# Patient Record
Sex: MALE | Race: WHITE | Employment: OTHER | URBAN - METROPOLITAN AREA
[De-identification: names, ages, dates, MRNs, and addresses within clinical notes are randomized per-mention and may not be internally consistent; named-entity substitution may affect disease eponyms.]

---

## 2017-01-12 ENCOUNTER — TRANSFERRED RECORDS (OUTPATIENT)
Dept: HEALTH INFORMATION MANAGEMENT | Facility: CLINIC | Age: 67
End: 2017-01-12

## 2017-02-16 ENCOUNTER — DOCUMENTATION ONLY (OUTPATIENT)
Dept: VASCULAR SURGERY | Facility: CLINIC | Age: 67
End: 2017-02-16

## 2017-07-27 ENCOUNTER — TRANSFERRED RECORDS (OUTPATIENT)
Dept: HEALTH INFORMATION MANAGEMENT | Facility: CLINIC | Age: 67
End: 2017-07-27

## 2017-07-27 LAB
CREAT SERPL-MCNC: 1 MG/DL (ref 0.7–1.2)
GLUCOSE SERPL-MCNC: 133 MG/DL (ref 74–106)
HBA1C MFR BLD: 6 % (ref 4–6)
POTASSIUM SERPL-SCNC: 4.2 MMOL/L (ref 3.5–5)

## 2017-08-02 ENCOUNTER — TRANSFERRED RECORDS (OUTPATIENT)
Dept: HEALTH INFORMATION MANAGEMENT | Facility: CLINIC | Age: 67
End: 2017-08-02

## 2017-09-12 ENCOUNTER — OFFICE VISIT (OUTPATIENT)
Dept: FAMILY MEDICINE | Facility: CLINIC | Age: 67
End: 2017-09-12
Payer: COMMERCIAL

## 2017-09-12 ENCOUNTER — RADIANT APPOINTMENT (OUTPATIENT)
Dept: GENERAL RADIOLOGY | Facility: CLINIC | Age: 67
End: 2017-09-12
Attending: PHYSICIAN ASSISTANT
Payer: COMMERCIAL

## 2017-09-12 VITALS
DIASTOLIC BLOOD PRESSURE: 84 MMHG | HEIGHT: 69 IN | OXYGEN SATURATION: 96 % | TEMPERATURE: 97.8 F | HEART RATE: 60 BPM | WEIGHT: 239.13 LBS | BODY MASS INDEX: 35.42 KG/M2 | SYSTOLIC BLOOD PRESSURE: 142 MMHG

## 2017-09-12 DIAGNOSIS — R05.9 COUGH: ICD-10-CM

## 2017-09-12 DIAGNOSIS — R06.02 SOB (SHORTNESS OF BREATH): ICD-10-CM

## 2017-09-12 DIAGNOSIS — R05.9 COUGH: Primary | ICD-10-CM

## 2017-09-12 DIAGNOSIS — E11.59 TYPE 2 DIABETES MELLITUS WITH OTHER CIRCULATORY COMPLICATIONS (H): ICD-10-CM

## 2017-09-12 DIAGNOSIS — I25.810 CORONARY ARTERY DISEASE INVOLVING CORONARY BYPASS GRAFT OF NATIVE HEART WITHOUT ANGINA PECTORIS: ICD-10-CM

## 2017-09-12 PROCEDURE — 71020 XR CHEST 2 VW: CPT

## 2017-09-12 PROCEDURE — 99214 OFFICE O/P EST MOD 30 MIN: CPT | Performed by: PHYSICIAN ASSISTANT

## 2017-09-12 RX ORDER — AZITHROMYCIN 250 MG/1
TABLET, FILM COATED ORAL
Qty: 6 TABLET | Refills: 0 | Status: SHIPPED | OUTPATIENT
Start: 2017-09-12 | End: 2017-10-31

## 2017-09-12 RX ORDER — ALBUTEROL SULFATE 90 UG/1
2 AEROSOL, METERED RESPIRATORY (INHALATION) EVERY 6 HOURS PRN
Qty: 1 INHALER | Refills: 0 | Status: SHIPPED | OUTPATIENT
Start: 2017-09-12 | End: 2018-10-29

## 2017-09-12 NOTE — PROGRESS NOTES
"  SUBJECTIVE:   Andrew Torres is a 67 year old male who presents to clinic today for the following health issues:    ENT Symptoms             Symptoms: cc Present Absent Comment   Fever/Chills   x    Fatigue  x     Muscle Aches   x    Eye Irritation   x    Sneezing   x    Nasal Neil/Drg  x     Sinus Pressure/Pain   x    Loss of smell       Dental pain       Sore Throat   x    Swollen Glands   x    Ear Pain/Fullness   x    Cough  x     Wheeze  x     Chest Pain  x  Pain from coughing   Shortness of breath  x     Rash       Other         Symptom duration:  1 week   Symptom severity:  same   Treatments tried:  mucinex, aspirin   Contacts:  none - he did get his wife sick too though     Problem list and histories reviewed & adjusted, as indicated.  Additional history: as documented    Labs reviewed in EPIC    Reviewed and updated as needed this visit by clinical staff       Reviewed and updated as needed this visit by Provider       ROS:  Constitutional, HEENT, cardiovascular, pulmonary, gi and gu systems are negative, except as otherwise noted.      OBJECTIVE:   /84 (BP Location: Right arm, Cuff Size: Adult Large)  Pulse 60  Temp 97.8  F (36.6  C) (Oral)  Ht 5' 8.75\" (1.746 m)  Wt 239 lb 2 oz (108.5 kg)  SpO2 96%  BMI 35.57 kg/m2  Body mass index is 35.57 kg/(m^2).  GENERAL: healthy, alert and no distress  HENT: ear canals and TM's normal, nose and mouth without ulcers or lesions  NECK: no adenopathy, no asymmetry, masses, or scars and thyroid normal to palpation  RESP: scattered crackles and no focal findings, mild restriction in lungs, otherwise lungs clear to auscultation - no rales, rhonchi or wheezes  CV: regular rate and rhythm, normal S1 S2, no S3 or S4, no murmur, click or rub, no peripheral edema and peripheral pulses strong  SKIN: no suspicious lesions or rashes and he has no peripheral edema     X ray chest - no acute findings, mild hazy in the left lower lung, otherwise appears okay "     ASSESSMENT/PLAN:     (R05) Cough  (primary encounter diagnosis)  Comment:   Plan: XR Chest 2 Views, albuterol (PROAIR         HFA/PROVENTIL HFA/VENTOLIN HFA) 108 (90 BASE)         MCG/ACT Inhaler, azithromycin (ZITHROMAX) 250         MG tablet        X ray findings as noted. Treat for pneumonia. No red flag symptoms or exam findings needing emergent cares, no signs of failure or fluid restriction. Inhaler to improve lung function, restriction is quite mild - no need for nebulizer today. This prescription is given with a discussion of side effects, risks and proper use.  Instructions are given to follow up if not improving or symptoms change or worsen as discussed.     (I25.810) Coronary artery disease involving coronary bypass graft of native heart without angina pectoris  Comment:   Plan: Asymptomatic, no chest pain    (E11.59) Type 2 diabetes mellitus with other circulatory complications (H)  Comment:   Plan: I reminded him he needs a diabetes visit with PCP     (R06.02) SOB (shortness of breath)  Comment:   Plan: XR Chest 2 Views        As noted     GAYLE CANDELARIA PA-C  [facility]

## 2017-09-12 NOTE — MR AVS SNAPSHOT
"              After Visit Summary   9/12/2017    Andrew Torres    MRN: [id number]           Patient Information     Date Of Birth          1950        Visit Information        Provider Department      9/12/2017 4:00 PM Gerardo Sawant PA-C [facility]        Today's Diagnoses     Cough    -  1    Coronary artery disease involving coronary bypass graft of native heart without angina pectoris        Type 2 diabetes mellitus with other circulatory complications (H)        SOB (shortness of breath)           Follow-ups after your visit        Who to contact     If you have questions or need follow up information about today's clinic visit or your schedule please contact [facility] directly at [phone].  Normal or non-critical lab and imaging results will be communicated to you by Nearbuy Systemshart, letter or phone within 4 business days after the clinic has received the results. If you do not hear from us within 7 days, please contact the clinic through Near[employer] Systemshart or phone. If you have a critical or abnormal lab result, we will notify you by phone as soon as possible.  Submit refill requests through Mirakl or call your pharmacy and they will forward the refill request to us. Please allow 3 business days for your refill to be completed.          Additional Information About Your Visit        MyChart Information     Mirakl lets you send messages to your doctor, view your test results, renew your prescriptions, schedule appointments and more. To sign up, go to [email or web address] . Click on \"Log in\" on the left side of the screen, which will take you to the Welcome page. Then click on \"Sign up Now\" on the right side of the page.     You will be asked to enter the access code listed below, as well as some personal information. Please follow the directions to create your username and password.     Your access code is: [email or web address]  Expires: 12/11/2017  4:46 PM     Your access " "code will  in 90 days. If you need help or a new code, please call your [place] clinic or [phone].        Care EveryWhere ID     This is your Care EveryWhere ID. This could be used by other organizations to access your [place] medical records  [id number]        Your Vitals Were     Pulse Temperature Height Pulse Oximetry BMI (Body Mass Index)       60 97.8  F (36.6  C) (Oral) 5' 8.75\" (1.746 m) 96% 35.57 kg/m2        Blood Pressure from Last 3 Encounters:   17 142/84   16 138/78   16 118/74    Weight from Last 3 Encounters:   17 239 lb 2 oz (108.5 kg)   16 232 lb (105.2 kg)   16 231 lb (104.8 kg)                 Today's Medication Changes          These changes are accurate as of: 17  4:46 PM.  If you have any questions, ask your nurse or doctor.               Start taking these medicines.        Dose/Directions    albuterol 108 (90 BASE) MCG/ACT Inhaler   Commonly known as:  PROAIR HFA/PROVENTIL HFA/VENTOLIN HFA   Used for:  Cough   Started by:  Gerardo Sawant PA-C        Dose:  2 puff   Inhale 2 puffs into the lungs every 6 hours as needed for shortness of breath / dyspnea or wheezing   Quantity:  1 Inhaler   Refills:  0       azithromycin 250 MG tablet   Commonly known as:  ZITHROMAX   Used for:  Cough   Started by:  Gerardo Sawant PA-C        Two tablets first day, then one tablet daily for four days.   Quantity:  6 tablet   Refills:  0            Where to get your medicines      These medications were sent to [facility] [address] [place] [address].  [address] [facility], [facility] [place] [address]     Phone:  [phone]     albuterol 108 (90 BASE) MCG/ACT Inhaler    azithromycin 250 MG tablet                Primary Care Provider Office Phone # Fax #    Ishan Andrade [phone] [address] [facility] [address]        Equal Access to Services     TIGRE VANCE AH: Beny woodard " kelly Grider, waleilada luqadaha, qaybta kaalmada cristin, rosamaria brennerevelyne vick. So [facility] [phone].    ATENCIÓN: Si torrila cesar, tiene a huber disposición servicios gratuitos de asistencia lingüística. Ronal al [phone].    We comply with applicable federal civil rights laws and [place] laws. We do not discriminate on the basis of race, color, national origin, age, disability sex, sexual orientation or gender identity.            Thank you!     Thank you for choosing [facility]  for your care. Our goal is always to provide you with excellent care. Hearing back from our patients is one way we can continue to improve our services. Please take a few minutes to complete the written survey that you may receive in the mail after your visit with us. Thank you!             Your Updated Medication List - Protect others around you: Learn how to safely use, store and throw away your medicines at www.disposemymeds.org.          This list is accurate as of: 9/12/17  4:46 PM.  Always use your most recent med list.                   Brand Name Dispense Instructions for use Diagnosis    albuterol 108 (90 BASE) MCG/ACT Inhaler    PROAIR HFA/PROVENTIL HFA/VENTOLIN HFA    1 Inhaler    Inhale 2 puffs into the lungs every 6 hours as needed for shortness of breath / dyspnea or wheezing    Cough       amLODIPine 5 MG tablet    NORVASC    90 tablet    Take 1 tablet (5 mg) by mouth daily    Essential hypertension with goal blood pressure less than 140/90       aspirin 325 MG tablet      Take by mouth daily        azithromycin 250 MG tablet    ZITHROMAX    6 tablet    Two tablets first day, then one tablet daily for four days.    Cough       B-12 1000 MCG Caps      Take  by mouth.        carvedilol 25 MG tablet    COREG    180 tablet    Take 1 tablet (25 mg) by mouth 2 times daily (with meals)    Essential hypertension with goal blood pressure less than 140/90       enalapril 5 MG tablet     VASOTEC    90 tablet    Take 1 tablet (5 mg) by mouth daily    Essential hypertension with goal blood pressure less than 140/90       nitroGLYcerin 0.4 MG sublingual tablet    NITROSTAT    2 tablet    1 tablet now    Chest pain, CAD (coronary artery disease)       omega-3 fatty acids 1200 MG capsule      Take 1 capsule by mouth daily.

## 2017-10-23 ENCOUNTER — TRANSFERRED RECORDS (OUTPATIENT)
Dept: HEALTH INFORMATION MANAGEMENT | Facility: CLINIC | Age: 67
End: 2017-10-23

## 2017-10-23 LAB
ALT SERPL-CCNC: 32 U/L (ref 13–61)
AST SERPL-CCNC: 21 U/L (ref 15–37)
CREAT SERPL-MCNC: 0.8 MG/DL (ref 0.7–1.2)
GLUCOSE SERPL-MCNC: 105 MG/DL (ref 74–106)
POTASSIUM SERPL-SCNC: 3.7 MMOL/L (ref 3.5–5)

## 2017-10-24 ENCOUNTER — TRANSFERRED RECORDS (OUTPATIENT)
Dept: HEALTH INFORMATION MANAGEMENT | Facility: CLINIC | Age: 67
End: 2017-10-24

## 2017-10-25 LAB
CREAT SERPL-MCNC: 0.9 MG/DL (ref 0.7–1.2)
GLUCOSE SERPL-MCNC: 117 MG/DL (ref 74–106)
POTASSIUM SERPL-SCNC: 3.8 MMOL/L (ref 3.5–5)

## 2017-10-29 ENCOUNTER — TRANSFERRED RECORDS (OUTPATIENT)
Dept: HEALTH INFORMATION MANAGEMENT | Facility: CLINIC | Age: 67
End: 2017-10-29

## 2017-10-31 ENCOUNTER — OFFICE VISIT (OUTPATIENT)
Dept: FAMILY MEDICINE | Facility: CLINIC | Age: 67
End: 2017-10-31
Payer: COMMERCIAL

## 2017-10-31 ENCOUNTER — TELEPHONE (OUTPATIENT)
Dept: FAMILY MEDICINE | Facility: CLINIC | Age: 67
End: 2017-10-31

## 2017-10-31 VITALS
HEART RATE: 53 BPM | SYSTOLIC BLOOD PRESSURE: 148 MMHG | HEIGHT: 69 IN | DIASTOLIC BLOOD PRESSURE: 79 MMHG | WEIGHT: 239 LBS | BODY MASS INDEX: 35.4 KG/M2 | TEMPERATURE: 97.5 F

## 2017-10-31 DIAGNOSIS — I25.810 CORONARY ARTERY DISEASE INVOLVING CORONARY BYPASS GRAFT OF NATIVE HEART WITHOUT ANGINA PECTORIS: ICD-10-CM

## 2017-10-31 DIAGNOSIS — E78.5 HYPERLIPIDEMIA LDL GOAL <100: ICD-10-CM

## 2017-10-31 DIAGNOSIS — Z09 HOSPITAL DISCHARGE FOLLOW-UP: Primary | ICD-10-CM

## 2017-10-31 DIAGNOSIS — I10 ESSENTIAL HYPERTENSION WITH GOAL BLOOD PRESSURE LESS THAN 140/90: ICD-10-CM

## 2017-10-31 DIAGNOSIS — G56.03 BILATERAL CARPAL TUNNEL SYNDROME: ICD-10-CM

## 2017-10-31 PROCEDURE — 99214 OFFICE O/P EST MOD 30 MIN: CPT | Performed by: FAMILY MEDICINE

## 2017-10-31 RX ORDER — ROSUVASTATIN CALCIUM 20 MG/1
TABLET, COATED ORAL
Qty: 24 TABLET | Refills: 3 | Status: SHIPPED | OUTPATIENT
Start: 2017-10-31 | End: 2017-11-15

## 2017-10-31 RX ORDER — ENALAPRIL MALEATE 5 MG/1
10 TABLET ORAL DAILY
COMMUNITY
Start: 2017-10-31 | End: 2017-11-15 | Stop reason: DRUGHIGH

## 2017-10-31 NOTE — PATIENT INSTRUCTIONS
-follow up in 2 weeks and bring your blood pressure cuff with you     Orders Placed This Encounter     rosuvastatin (CRESTOR) 20 MG tablet     Sig: Take 1 tablet 2 times per week     Dispense:  24 tablet     Refill:  3         enalapril (VASOTEC) 5 MG tablet     Sig: Take 2 tablets (10 mg) by mouth daily     [facility]

## 2017-10-31 NOTE — MR AVS SNAPSHOT
After Visit Summary   10/31/2017    Andrew Torres    MRN: [id number]           Patient Information     Date Of Birth          1950        Visit Information        Provider Department      10/31/2017 10:20 AM Ishan Andrade MD [facility]        Today's Diagnoses     Hospital discharge follow-up    -  1    Essential hypertension with goal blood pressure less than 140/90        Hyperlipidemia LDL goal <100        Coronary artery disease involving coronary bypass graft of native heart without angina pectoris          Care Instructions    -follow up in 2 weeks and bring your blood pressure cuff with you     Orders Placed This Encounter     rosuvastatin (CRESTOR) 20 MG tablet     Sig: Take 1 tablet 2 times per week     Dispense:  24 tablet     Refill:  3         enalapril (VASOTEC) 5 MG tablet     Sig: Take 2 tablets (10 mg) by mouth daily     [facility]          Follow-ups after your visit        Who to contact     If you have questions or need follow up information about today's clinic visit or your schedule please contact [facility] directly at [phone].  Normal or non-critical lab and imaging results will be communicated to you by Cybersourcehart, letter or phone within 4 business days after the clinic has received the results. If you do not hear from us within 7 days, please contact the clinic through Cybersourcehart or phone. If you have a critical or abnormal lab result, we will notify you by phone as soon as possible.  Submit refill requests through [employer] or call your pharmacy and they will forward the refill request to us. Please allow 3 business days for your refill to be completed.          Additional Information About Your Visit        MyChart Information     [employer] lets you send messages to your doctor, view your test results, renew your prescriptions, schedule appointments and more. To sign up, go to [email or web address] .  "Click on \"Log in\" on the left side of the screen, which will take you to the Welcome page. Then click on \"Sign up Now\" on the right side of the page.     You will be asked to enter the access code listed below, as well as some personal information. Please follow the directions to create your username and password.     Your access code is: [email or web address]  Expires: 2017  4:46 PM     Your access code will  in 90 days. If you need help or a new code, please call your [place] clinic or [phone].        Care EveryWhere ID     This is your Care EveryWhere ID. This could be used by other organizations to access your [place] medical records  [id number]        Your Vitals Were     Pulse Temperature Height BMI (Body Mass Index)          53 97.5  F (36.4  C) (Oral) 5' 8.75\" (1.746 m) 35.55 kg/m2         Blood Pressure from Last 3 Encounters:   10/31/17 148/79   17 142/84   16 138/78    Weight from Last 3 Encounters:   10/31/17 239 lb (108.4 kg)   17 239 lb 2 oz (108.5 kg)   16 232 lb (105.2 kg)              Today, you had the following     No orders found for display         Today's Medication Changes          These changes are accurate as of: 10/31/17 11:36 AM.  If you have any questions, ask your nurse or doctor.               Start taking these medicines.        Dose/Directions    rosuvastatin 20 MG tablet   Commonly known as:  CRESTOR   Used for:  Hospital discharge follow-up, Hyperlipidemia LDL goal <100, Coronary artery disease involving coronary bypass graft of native heart without angina pectoris   Started by:  Ishan Andrade MD        Take 1 tablet 2 times per week   Quantity:  24 tablet   Refills:  3            Where to get your medicines      Some of these will need a paper prescription and others can be bought over the counter.  Ask your nurse if you have questions.     Bring a paper prescription for each of these medications     rosuvastatin 20 MG tablet             "    Primary Care Provider Office Phone # Fax #    Ishan Andrade [phone] [address] [facility] [address]        Equal Access to Services     TIGRE VANCE : Hadii aad ku hadaubreyrey Cheo, waaxda luqadaha, qaybta kaalmada cristin, rosamaria cárdenas isidorokrishna cervantesmartina hickman. So [facility] [phone].    ATENCIÓN: Si habla español, tiene a huber disposición servicios gratuitos de asistencia lingüística. Llame al [phone].    We comply with applicable federal civil rights laws and [place] laws. We do not discriminate on the basis of race, color, national origin, age, disability, sex, sexual orientation, or gender identity.            Thank you!     Thank you for choosing [facility]  for your care. Our goal is always to provide you with excellent care. Hearing back from our patients is one way we can continue to improve our services. Please take a few minutes to complete the written survey that you may receive in the mail after your visit with us. Thank you!             Your Updated Medication List - Protect others around you: Learn how to safely use, store and throw away your medicines at www.disposemymeds.org.          This list is accurate as of: 10/31/17 11:36 AM.  Always use your most recent med list.                   Brand Name Dispense Instructions for use Diagnosis    albuterol 108 (90 BASE) MCG/ACT Inhaler    PROAIR HFA/PROVENTIL HFA/VENTOLIN HFA    1 Inhaler    Inhale 2 puffs into the lungs every 6 hours as needed for shortness of breath / dyspnea or wheezing    Cough       amLODIPine 5 MG tablet    NORVASC    90 tablet    Take 1 tablet (5 mg) by mouth daily    Essential hypertension with goal blood pressure less than 140/90       aspirin 325 MG tablet      Take by mouth daily        B-12 1000 MCG Caps      Take  by mouth.        carvedilol 25 MG tablet    COREG    180 tablet    Take 1 tablet (25 mg) by mouth 2 times daily (with meals)    Essential  hypertension with goal blood pressure less than 140/90       enalapril 5 MG tablet    VASOTEC     Take 2 tablets (10 mg) by mouth daily    Essential hypertension with goal blood pressure less than 140/90       nitroGLYcerin 0.4 MG sublingual tablet    NITROSTAT    2 tablet    1 tablet now    Chest pain, CAD (coronary artery disease)       omega-3 fatty acids 1200 MG capsule      Take 1 capsule by mouth daily.        rosuvastatin 20 MG tablet    CRESTOR    24 tablet    Take 1 tablet 2 times per week    Hospital discharge follow-up, Hyperlipidemia LDL goal <100, Coronary artery disease involving coronary bypass graft of native heart without angina pectoris

## 2017-10-31 NOTE — PROGRESS NOTES
"  SUBJECTIVE:   Andrew Torres is a 67 year old male who presents to clinic today for the following health issues:          Hospital Follow-up Visit:    Hospital/Nursing Home/IP Rehab Facility: [facility]  Date of Admission: 10/23/17  Date of Discharge: 10/25/17  Reason(s) for Admission: lightheaded, dizzy, blurry vision            Problems taking medications regularly:  None       Medication changes since discharge: None       Problems adhering to non-medication therapy:  None    Summary of hospitalization:   Discharge summary unavailable  Diagnostic Tests/Treatments reviewed.  Follow up needed: need to review discharge summary and then will decide about further workup.   Other Healthcare Providers Involved in Patient s Care:         None  Update since discharge: fluctuating course.     Post Discharge Medication Reconciliation: unable to reconcile discharge medications due to discharge summary not available.  Plan of care communicated with patient     Coding guidelines for this visit:  Type of Medical   Decision Making Face-to-Face Visit       within 7 Days of discharge Face-to-Face Visit        within 14 days of discharge   Moderate Complexity [id number]   High Complexity [id number]          Seen in ED due to sudden onset of lIghtheadedess. No nausea or vominting. CT of head, CXR, US carotid, EKG, and ECHO were negative, with the exception of a \"spot\" in his head, per patient. His antihypertensives were readjusted because his blood pressures were running \"really high\". Following disposition home, he continues to note occasional lightheadedness along with elevate blood pressures. He has brought a copy of the hospital AVS. They had recommended starting a statin. Difficulty tolerating adverse effects due to adverse effects (myalgias)    Numbness. Intermittent bilateral hand numbness (L>R). Has to shake hands when he's driving because hands \"fall asleep\".      Problem list and histories reviewed & adjusted, as " indicated.  Additional history: as documented    Patient Active Problem List   Diagnosis     CAD (coronary artery disease) stent placed 2006- life long plavix recommended     Carotid stenosis stent placed right 2010     Advanced directives, counseling/discussion     Hyperlipidemia LDL goal <100     Type 2 diabetes mellitus with other circulatory complications     Coronary artery disease involving coronary bypass graft of native heart without angina pectoris     Gastroesophageal reflux disease without esophagitis     Essential hypertension with goal blood pressure less than 140/90     History reviewed. No pertinent surgical history.    Social History   Substance Use Topics     Smoking status: Never Smoker     Smokeless tobacco: Never Used     Alcohol use Yes      Comment: 2-3 drinks per week     Family History   Problem Relation Age of Onset     Hypertension Mother      Hypertension Father      HEART DISEASE Paternal Grandmother      CANCER Sister          Current Outpatient Prescriptions   Medication Sig Dispense Refill     albuterol (PROAIR HFA/PROVENTIL HFA/VENTOLIN HFA) 108 (90 BASE) MCG/ACT Inhaler Inhale 2 puffs into the lungs every 6 hours as needed for shortness of breath / dyspnea or wheezing 1 Inhaler 0     enalapril (VASOTEC) 5 MG tablet Take 1 tablet (5 mg) by mouth daily (Patient taking differently: Take 10 mg by mouth daily ) 90 tablet 3     amLODIPine (NORVASC) 5 MG tablet Take 1 tablet (5 mg) by mouth daily 90 tablet 3     carvedilol (COREG) 25 MG tablet Take 1 tablet (25 mg) by mouth 2 times daily (with meals) 180 tablet 3     aspirin 325 MG tablet Take by mouth daily       nitroglycerin (NITROSTAT) 0.4 MG SL tablet 1 tablet now 2 tablet 0     omega-3 fatty acids (FISH OIL) 1200 MG capsule Take 1 capsule by mouth daily.       Cyanocobalamin (B-12) 1000 MCG CAPS Take  by mouth.       Allergies   Allergen Reactions     Sulfa Drugs      Recent Labs   Lab Test  11/09/16   1123  11/17/15   0836 09/29/15   08/12/15   07/07/15   1230  08/18/14   1042  05/27/14   1027   03/11/13   1044 10/22/12 04/27/12   A1C  5.4   --    --    --   5.0   --   5.6  5.4   --    < >  5.4  5.5   --    LDL   --    --   63   --    --    --    --   47  113   --   81  73  52   HDL   --    --   38   --    --    --    --   52  36*   --   34*  48  40   TRIG   --    --   90   --    --    --    --   153*  224*   --   113  74  79   ALT   --   17   --    --    --    --    --    --    --    --    --   19  32   CR  0.89  0.78   --    < >   --    < >  0.90  1.00   --    < >  0.91  1.3   --    GFRESTIMATED  85  >90  Non  GFR Calc     --    --    --    --   84  75   --    < >  84   --    --    GFRESTBLACK  >90   GFR Calc    >90   GFR Calc     --    --    --    --   >90   GFR Calc    >90   GFR Calc     --    < >  >90   --    --    POTASSIUM  3.9  4.2   --    < >   --    < >  3.7  3.9   --    < >  3.7  4.3   --    TSH   --    --    --    --    --    --   0.60   --    --    --   1.01   --    --     < > = values in this interval not displayed.      BP Readings from Last 3 Encounters:   10/31/17 142/78   09/12/17 142/84   11/09/16 138/78    Wt Readings from Last 3 Encounters:   10/31/17 108.4 kg (239 lb)   09/12/17 108.5 kg (239 lb 2 oz)   11/09/16 105.2 kg (232 lb)                  Labs reviewed in EPIC          Reviewed and updated as needed this visit by clinical staffTobacco  Allergies  Med Hx  Surg Hx  Fam Hx  Soc Hx      Reviewed and updated as needed this visit by Provider         ROS:  Constitutional, HEENT, cardiovascular, pulmonary, GI, , musculoskeletal, neuro, skin, endocrine and psych systems are negative, except as otherwise noted.    This document serves as a record of the services and decisions personally performed and made by Diego Andrade MD. It was created on their behalf by Shon Kelly, a trained medical scribe. The creation of this document is based the  "provider's statements to the medical scribe.  Shon Kelly October 31, 2017 10:40 AM         OBJECTIVE:   /78 (Cuff Size: Adult Large)  Pulse 56  Temp 97.5  F (36.4  C) (Oral)  Ht 1.746 m (5' 8.75\")  Wt 108.4 kg (239 lb)  BMI 35.55 kg/m2  Body mass index is 35.55 kg/(m^2).  GENERAL: healthy, alert and no distress  HENT: ear canals and TM's normal, nose and mouth without ulcers or lesions  NECK: no adenopathy, no asymmetry, masses, or scars and thyroid normal to palpation  RESP: lungs clear to auscultation - no rales, rhonchi or wheezes  CV: regular rate and rhythm, normal S1 S2, no S3 or S4, no murmur, click or rub  SKIN: no suspicious lesions or rashes  NEURO: Normal strength and tone, mentation intact and speech normal -- dizziness going from lying to sitting, negative epley's. Positive tinel's test (L>R).   PSYCH: mentation appears normal, affect normal/bright      Diagnostic Test Results:    Orthostatics were negative.     ASSESSMENT/PLAN:   (Z09) Hospital discharge follow-up  (primary encounter diagnosis)  Comment: discharge summary not available. Comprehensive cardiac workup was unremarkable, per patient. Will review discharge summary and then decide about further workup.   Plan: rosuvastatin (CRESTOR) 20 MG tablet        -obtain and review records    (I10) Essential hypertension with goal blood pressure less than 140/90  Comment:   Plan: enalapril (VASOTEC) 5 MG tablet        -discharge summary will be reviewed    (E78.5) Hyperlipidemia LDL goal <100  Comment:conjectural account of elevated cholesterol levels during hospitalization given that there were recommendations to start Crestor.   Plan: rosuvastatin (CRESTOR) 20 MG tablet        -start crestor. Obtain and review discharge summary.     (I26.810) Coronary artery disease involving coronary bypass graft of native heart without angina pectoris  Comment:   Plan: rosuvastatin (CRESTOR) 20 MG tablet            (G56.03) Bilateral carpal tunnel " syndrome  Comment:  Plan: wrist splint.            -follow up as needed      The information in this document, created by the medical scribe for me, accurately reflects the services I personally performed and the decisions made by me. I have reviewed and approved this document for accuracy prior to leaving the patient care area.    Ishan Andrade MD, MD  [facility]

## 2017-10-31 NOTE — NURSING NOTE
"Chief Complaint   Patient presents with     Hospital F/U       Initial /78 (Cuff Size: Adult Large)  Pulse 56  Temp 97.5  F (36.4  C) (Oral)  Ht 5' 8.75\" (1.746 m)  Wt 239 lb (108.4 kg)  BMI 35.55 kg/m2 Estimated body mass index is 35.55 kg/(m^2) as calculated from the following:    Height as of this encounter: 5' 8.75\" (1.746 m).    Weight as of this encounter: 239 lb (108.4 kg).  Medication Reconciliation: complete   Sophie Bull, Certified Medical Assistant (AAMA)     "

## 2017-10-31 NOTE — NURSING NOTE
One CRESTOR script faxed to [facility] at [phone].    Kat Couch CMA ([facility])

## 2017-11-07 ENCOUNTER — TRANSFERRED RECORDS (OUTPATIENT)
Dept: HEALTH INFORMATION MANAGEMENT | Facility: CLINIC | Age: 67
End: 2017-11-07

## 2017-11-08 ENCOUNTER — DOCUMENTATION ONLY (OUTPATIENT)
Dept: FAMILY MEDICINE | Facility: CLINIC | Age: 67
End: 2017-11-08

## 2017-11-08 NOTE — PROGRESS NOTES
Records received from [facility].  Routed to Ishan Andrade MD to review and return to be scanned into chart.      .Yun Jessica  Patient Representative

## 2017-11-15 ENCOUNTER — OFFICE VISIT (OUTPATIENT)
Dept: FAMILY MEDICINE | Facility: CLINIC | Age: 67
End: 2017-11-15
Payer: COMMERCIAL

## 2017-11-15 VITALS
TEMPERATURE: 97.9 F | SYSTOLIC BLOOD PRESSURE: 153 MMHG | HEART RATE: 60 BPM | WEIGHT: 239 LBS | BODY MASS INDEX: 35.4 KG/M2 | DIASTOLIC BLOOD PRESSURE: 70 MMHG | HEIGHT: 69 IN

## 2017-11-15 DIAGNOSIS — I25.810 CORONARY ARTERY DISEASE INVOLVING CORONARY BYPASS GRAFT OF NATIVE HEART WITHOUT ANGINA PECTORIS: ICD-10-CM

## 2017-11-15 DIAGNOSIS — E78.5 HYPERLIPIDEMIA LDL GOAL <70: ICD-10-CM

## 2017-11-15 DIAGNOSIS — Z11.59 NEED FOR HEPATITIS C SCREENING TEST: ICD-10-CM

## 2017-11-15 DIAGNOSIS — Z13.89 SCREENING FOR DIABETIC PERIPHERAL NEUROPATHY: ICD-10-CM

## 2017-11-15 DIAGNOSIS — I10 HYPERTENSION GOAL BP (BLOOD PRESSURE) < 140/90: ICD-10-CM

## 2017-11-15 DIAGNOSIS — E11.59 TYPE 2 DIABETES MELLITUS WITH OTHER CIRCULATORY COMPLICATIONS (CODE): Primary | ICD-10-CM

## 2017-11-15 DIAGNOSIS — Z13.5 SCREENING FOR DIABETIC RETINOPATHY: ICD-10-CM

## 2017-11-15 LAB — HBA1C MFR BLD: 5.8 % (ref 4.3–6)

## 2017-11-15 PROCEDURE — 99207 C FOOT EXAM  NO CHARGE: CPT | Performed by: FAMILY MEDICINE

## 2017-11-15 PROCEDURE — 84443 ASSAY THYROID STIM HORMONE: CPT | Performed by: FAMILY MEDICINE

## 2017-11-15 PROCEDURE — 99214 OFFICE O/P EST MOD 30 MIN: CPT | Performed by: FAMILY MEDICINE

## 2017-11-15 PROCEDURE — 82565 ASSAY OF CREATININE: CPT | Performed by: FAMILY MEDICINE

## 2017-11-15 PROCEDURE — 80061 LIPID PANEL: CPT | Performed by: FAMILY MEDICINE

## 2017-11-15 PROCEDURE — 83036 HEMOGLOBIN GLYCOSYLATED A1C: CPT | Performed by: FAMILY MEDICINE

## 2017-11-15 PROCEDURE — 36415 COLL VENOUS BLD VENIPUNCTURE: CPT | Performed by: FAMILY MEDICINE

## 2017-11-15 PROCEDURE — G0472 HEP C SCREEN HIGH RISK/OTHER: HCPCS | Performed by: FAMILY MEDICINE

## 2017-11-15 PROCEDURE — 82043 UR ALBUMIN QUANTITATIVE: CPT | Performed by: FAMILY MEDICINE

## 2017-11-15 RX ORDER — ROSUVASTATIN CALCIUM 20 MG/1
TABLET, COATED ORAL
Qty: 24 TABLET | Refills: 3 | Status: SHIPPED | OUTPATIENT
Start: 2017-11-15 | End: 2018-03-21 | Stop reason: ALTCHOICE

## 2017-11-15 RX ORDER — ENALAPRIL MALEATE 20 MG/1
20 TABLET ORAL DAILY
Qty: 90 TABLET | Refills: 3 | Status: SHIPPED | OUTPATIENT
Start: 2017-11-15 | End: 2018-03-21 | Stop reason: ALTCHOICE

## 2017-11-15 NOTE — MR AVS SNAPSHOT
After Visit Summary   11/15/2017    Andrew Torres    MRN: [id number]           Patient Information     Date Of Birth          1950        Visit Information        Provider Department      11/15/2017 9:40 AM Ishan Andrade MD [facility]        Today's Diagnoses     Type 2 diabetes mellitus with other circulatory complications (CODE) (H)    -  1    Need for hepatitis C screening test        Screening for diabetic retinopathy        Screening for diabetic peripheral neuropathy        Need for prophylactic vaccination against Streptococcus pneumoniae (pneumococcus)        Hyperlipidemia LDL goal <100        Coronary artery disease involving coronary bypass graft of native heart without angina pectoris        Hypertension goal BP (blood pressure) < 140/90          Care Instructions    Orders Placed This Encounter     FOOT EXAM  NO CHARGE [28849.114]     Hepatitis C Screen Reflex to HCV RNA Quant and Genotype     The Hepatitis C Screen testing will be used for patients age 45-65 following the CDC recommendations. HEP C screening testing can also be ordered for any patient for which it is clinically recommended.     CREATININE     Last Lab Result: Creatinine (mg/dL)       Date                     Value                 11/09/2016               0.89             ----------     HEMOGLOBIN A1C     Last Lab Result: Hemoglobin A1C (%)       Date                     Value                 11/09/2016               5.4              ----------     Lipid panel reflex to direct LDL Fasting     Last Lab Result: LDL Cholesterol Calculated (mg/dL)       Date                     Value                 09/29/2015               63               ----------     Order Specific Question:   Perform labs while fasting or non-fasting?     Answer:   Fasting     Albumin Random Urine Quantitative with Creat Ratio     This test includes a Creatinine Ratio.  Do not order TPG130 (Creatinine Random  "Urine)  Last Lab Result: Albumin Urine mg/g Cr (mg/g Cr)       Date                     Value                 11/09/2016               89.07 (H)        ----------  Creatinine Urine (mg/dL)       Date                     Value                 11/09/2016               73               ----------     Order Specific Question:   Includes     Answer:   with Creat Ratio     TSH WITH FREE T4 REFLEX     Last Lab Result: TSH (mU/L)       Date                     Value                 07/07/2015               0.60             ----------     rosuvastatin (CRESTOR) 20 MG tablet     Sig: Take 1 tablet 2 times per week     Dispense:  24 tablet     Refill:  3     Did not tolerate other statins due to Myalgias. Recommend 2 times per week crestor     enalapril (VASOTEC) 20 MG tablet     Sig: Take 1 tablet (20 mg) by mouth daily     Dispense:  90 tablet     Refill:  3     Start vasotect 20 mg once daily          Follow-ups after your visit        Who to contact     If you have questions or need follow up information about today's clinic visit or your schedule please contact [facility] directly at [phone].  Normal or non-critical lab and imaging results will be communicated to you by [employer], letter or phone within 4 business days after the clinic has received the results. If you do not hear from us within 7 days, please contact the clinic through [employer] or phone. If you have a critical or abnormal lab result, we will notify you by phone as soon as possible.  Submit refill requests through [employer] or call your pharmacy and they will forward the refill request to us. Please allow 3 business days for your refill to be completed.          Additional Information About Your Visit        [employer] Information     [employer] lets you send messages to your doctor, view your test results, renew your prescriptions, schedule appointments and more. To sign up, go to [email or web address] . Click on \"Log in\" on the left side " "of the screen, which will take you to the Welcome page. Then click on \"Sign up Now\" on the right side of the page.     You will be asked to enter the access code listed below, as well as some personal information. Please follow the directions to create your username and password.     Your access code is: [email or web address]  Expires: 2017  3:46 PM     Your access code will  in 90 days. If you need help or a new code, please call your [place] clinic or [phone].        Care EveryWhere ID     This is your Care EveryWhere ID. This could be used by other organizations to access your [place] medical records  [id number]        Your Vitals Were     Pulse Temperature Height BMI (Body Mass Index)          60 97.9  F (36.6  C) (Oral) 5' 8.75\" (1.746 m) 35.55 kg/m2         Blood Pressure from Last 3 Encounters:   11/15/17 153/70   10/31/17 148/79   17 142/84    Weight from Last 3 Encounters:   11/15/17 239 lb (108.4 kg)   10/31/17 239 lb (108.4 kg)   17 239 lb 2 oz (108.5 kg)              We Performed the Following     Albumin Random Urine Quantitative with Creat Ratio     CREATININE     FOOT EXAM  NO CHARGE [25685.114]     HEMOGLOBIN A1C     Hepatitis C Screen Reflex to HCV RNA Quant and Genotype     Lipid panel reflex to direct LDL Fasting     TSH WITH FREE T4 REFLEX          Today's Medication Changes          These changes are accurate as of: 11/15/17 10:58 AM.  If you have any questions, ask your nurse or doctor.               These medicines have changed or have updated prescriptions.        Dose/Directions    enalapril 20 MG tablet   Commonly known as:  VASOTEC   This may have changed:    - medication strength  - how much to take   Used for:  Coronary artery disease involving coronary bypass graft of native heart without angina pectoris, Hypertension goal BP (blood pressure) < 140/90   Changed by:  Ishan Andrade MD        Dose:  20 mg   Take 1 tablet (20 mg) by mouth daily   Quantity:  " 90 tablet   Refills:  3            Where to get your medicines      Some of these will need a paper prescription and others can be bought over the counter.  Ask your nurse if you have questions.     Bring a paper prescription for each of these medications     enalapril 20 MG tablet    rosuvastatin 20 MG tablet                Primary Care Provider Office Phone # Fax #    Ishan Chidi Andrade [phone]       [address] [facility] [address]        Equal Access to Services     TIGRE VANCE : Hadii aad ku hadasho Soomaali, waaxda luqadaha, qaybta kaalmada adeegyada, waxay idiin hayaan adeeg kharamartina la'angie . So [facility] [place] [phone].    ATENCIÓN: Si habla español, tiene a huber disposición servicios gratuitos de asistencia lingüística. [facility] [phone].    We comply with applicable federal civil rights laws and [place] laws. We do not discriminate on the basis of race, color, national origin, age, disability, sex, sexual orientation, or gender identity.            Thank you!     Thank you for choosing [facility]  for your care. Our goal is always to provide you with excellent care. Hearing back from our patients is one way we can continue to improve our services. Please take a few minutes to complete the written survey that you may receive in the mail after your visit with us. Thank you!             Your Updated Medication List - Protect others around you: Learn how to safely use, store and throw away your medicines at www.disposemymeds.org.          This list is accurate as of: 11/15/17 10:58 AM.  Always use your most recent med list.                   Brand Name Dispense Instructions for use Diagnosis    albuterol 108 (90 BASE) MCG/ACT Inhaler    PROAIR HFA/PROVENTIL HFA/VENTOLIN HFA    1 Inhaler    Inhale 2 puffs into the lungs every 6 hours as needed for shortness of breath / dyspnea or wheezing    Cough       amLODIPine 5 MG tablet    NORVASC    90 tablet    Take 1  tablet (5 mg) by mouth daily    Essential hypertension with goal blood pressure less than 140/90       aspirin 325 MG tablet      Take by mouth daily        B-12 1000 MCG Caps      Take  by mouth.        carvedilol 25 MG tablet    COREG    180 tablet    Take 1 tablet (25 mg) by mouth 2 times daily (with meals)    Essential hypertension with goal blood pressure less than 140/90       enalapril 20 MG tablet    VASOTEC    90 tablet    Take 1 tablet (20 mg) by mouth daily    Coronary artery disease involving coronary bypass graft of native heart without angina pectoris, Hypertension goal BP (blood pressure) < 140/90       nitroGLYcerin 0.4 MG sublingual tablet    NITROSTAT    2 tablet    1 tablet now    Chest pain, CAD (coronary artery disease)       omega-3 fatty acids 1200 MG capsule      Take 1 capsule by mouth daily.        rosuvastatin 20 MG tablet    CRESTOR    24 tablet    Take 1 tablet 2 times per week    Hyperlipidemia LDL goal <100, Coronary artery disease involving coronary bypass graft of native heart without angina pectoris

## 2017-11-15 NOTE — PATIENT INSTRUCTIONS
Orders Placed This Encounter     FOOT EXAM  NO CHARGE [25424.114]     Hepatitis C Screen Reflex to HCV RNA Quant and Genotype     The Hepatitis C Screen testing will be used for patients age 45-65 following the CDC recommendations. HEP C screening testing can also be ordered for any patient for which it is clinically recommended.     CREATININE     Last Lab Result: Creatinine (mg/dL)       Date                     Value                 11/09/2016               0.89             ----------     HEMOGLOBIN A1C     Last Lab Result: Hemoglobin A1C (%)       Date                     Value                 11/09/2016               5.4              ----------     Lipid panel reflex to direct LDL Fasting     Last Lab Result: LDL Cholesterol Calculated (mg/dL)       Date                     Value                 09/29/2015               63               ----------     Order Specific Question:   Perform labs while fasting or non-fasting?     Answer:   Fasting     Albumin Random Urine Quantitative with Creat Ratio     This test includes a Creatinine Ratio.  Do not order KIN612 (Creatinine Random Urine)  Last Lab Result: Albumin Urine mg/g Cr (mg/g Cr)       Date                     Value                 11/09/2016               89.07 (H)        ----------  Creatinine Urine (mg/dL)       Date                     Value                 11/09/2016               73               ----------     Order Specific Question:   Includes     Answer:   with Creat Ratio     TSH WITH FREE T4 REFLEX     Last Lab Result: TSH (mU/L)       Date                     Value                 07/07/2015               0.60             ----------     rosuvastatin (CRESTOR) 20 MG tablet     Sig: Take 1 tablet 2 times per week     Dispense:  24 tablet     Refill:  3     Did not tolerate other statins due to Myalgias. Recommend 2 times per week crestor     enalapril (VASOTEC) 20 MG tablet     Sig: Take 1 tablet (20 mg) by mouth daily     Dispense:  90 tablet      Refill:  3     Start vasotect 20 mg once daily

## 2017-11-15 NOTE — NURSING NOTE
"Chief Complaint   Patient presents with     Diabetes     pt declined labs     Hypertension     Flu Shot     pt declined       Initial /74 (Cuff Size: Adult Large)  Pulse 60  Temp 97.9  F (36.6  C) (Oral)  Ht 5' 8.75\" (1.746 m)  Wt 239 lb (108.4 kg)  BMI 35.55 kg/m2 Estimated body mass index is 35.55 kg/(m^2) as calculated from the following:    Height as of this encounter: 5' 8.75\" (1.746 m).    Weight as of this encounter: 239 lb (108.4 kg).  Medication Reconciliation: complete   Sophie Bull, Certified Medical Assistant (AAMA)     "

## 2017-11-15 NOTE — PROGRESS NOTES
SUBJECTIVE:   Andrew Torres is a 67 year old male who presents to clinic today for the following health issues:      Diabetes Follow-up      Patient is checking blood sugars: not at all    Diabetic concerns: None     Symptoms of hypoglycemia (low blood sugar): none     Paresthesias (numbness or burning in feet) or sores: No     Date of last diabetic eye exam: 10/2017    Lab Results   Component Value Date    A1C 5.4 11/09/2016    A1C 5.0 08/12/2015    A1C 5.6 07/07/2015    A1C 5.4 08/18/2014    A1C 5.4 05/19/2014     He has been controlled with diet for past 3 months    Hyperlipidemia Follow-Up      Rate your low fat/cholesterol diet?: fair    Taking statin?  Unsure, [facility]'t [facility].    Other lipid medications/supplements?:  None    He did not tolerate Atorvastatin and has been on Lovastatin in the past. With his hx of CAD , diabetes and HTN we need to be very aggressive with his lipids. We recommended starting Crestor at 2 times per week and increase if tolerated.     Hypertension Follow-up      Outpatient blood pressures are being checked at home.  Results are much higher than today's reading, thinks his machine is off.    Low Salt Diet: no added salt        Amount of exercise or physical activity: just walking around at home    Problems taking medications regularly: Yes,  problems remembering to take    Medication side effects: none    Diet: low salt and low fat/cholesterol    [facility]. Seen due to sudden onset of dizziness. CXR, US Carotid bilateral, and CT of head were unremarkable for acute changes. CT of Head did show small nonacute small ischemic infarct right external capsule.    HTN. Home blood pressures routinely ~160 systolic. Has been taking antihypertensives as instructed.       Problem list and histories reviewed & adjusted, as indicated.  Additional history: as documented    Patient Active Problem List   Diagnosis     CAD (coronary artery  disease) stent placed 2006- life long plavix recommended     Carotid stenosis stent placed right 2010     Advanced directives, counseling/discussion     Hyperlipidemia LDL goal <100     Type 2 diabetes mellitus with other circulatory complications     Coronary artery disease involving coronary bypass graft of native heart without angina pectoris     Gastroesophageal reflux disease without esophagitis     Essential hypertension with goal blood pressure less than 140/90     History reviewed. No pertinent surgical history.    Social History   Substance Use Topics     Smoking status: Never Smoker     Smokeless tobacco: Never Used     Alcohol use Yes      Comment: 2-3 drinks per week     Family History   Problem Relation Age of Onset     Hypertension Mother      Hypertension Father      HEART DISEASE Paternal Grandmother      CANCER Sister          Current Outpatient Prescriptions   Medication Sig Dispense Refill     albuterol (PROAIR HFA/PROVENTIL HFA/VENTOLIN HFA) 108 (90 BASE) MCG/ACT Inhaler Inhale 2 puffs into the lungs every 6 hours as needed for shortness of breath / dyspnea or wheezing 1 Inhaler 0     amLODIPine (NORVASC) 5 MG tablet Take 1 tablet (5 mg) by mouth daily 90 tablet 3     carvedilol (COREG) 25 MG tablet Take 1 tablet (25 mg) by mouth 2 times daily (with meals) 180 tablet 3     aspirin 325 MG tablet Take by mouth daily       nitroglycerin (NITROSTAT) 0.4 MG SL tablet 1 tablet now 2 tablet 0     omega-3 fatty acids (FISH OIL) 1200 MG capsule Take 1 capsule by mouth daily.       Cyanocobalamin (B-12) 1000 MCG CAPS Take  by mouth.       rosuvastatin (CRESTOR) 20 MG tablet Take 1 tablet 2 times per week 24 tablet 3     enalapril (VASOTEC) 5 MG tablet Take 2 tablets (10 mg) by mouth daily       Allergies   Allergen Reactions     Sulfa Drugs      Recent Labs   Lab Test  11/09/16   1123  11/17/15   0836 09/29/15  08/12/15   07/07/15   1230  08/18/14   1042  05/27/14   1027   03/11/13   1044 10/22/12 04/27/12    A1C  5.4   --    --    --   5.0   --   5.6  5.4   --    < >  5.4  5.5   --    LDL   --    --   63   --    --    --    --   47  113   --   81  73  52   HDL   --    --   38   --    --    --    --   52  36*   --   34*  48  40   TRIG   --    --   90   --    --    --    --   153*  224*   --   113  74  79   ALT   --   17   --    --    --    --    --    --    --    --    --   19  32   CR  0.89  0.78   --    < >   --    < >  0.90  1.00   --    < >  0.91  1.3   --    GFRESTIMATED  85  >90  Non  GFR Calc     --    --    --    --   84  75   --    < >  84   --    --    GFRESTBLACK  >90   GFR Calc    >90   GFR Calc     --    --    --    --   >90   GFR Calc    >90   GFR Calc     --    < >  >90   --    --    POTASSIUM  3.9  4.2   --    < >   --    < >  3.7  3.9   --    < >  3.7  4.3   --    TSH   --    --    --    --    --    --   0.60   --    --    --   1.01   --    --     < > = values in this interval not displayed.      BP Readings from Last 3 Encounters:   11/15/17 138/74   10/31/17 148/79   09/12/17 142/84    Wt Readings from Last 3 Encounters:   11/15/17 108.4 kg (239 lb)   10/31/17 108.4 kg (239 lb)   09/12/17 108.5 kg (239 lb 2 oz)                  Labs reviewed in EPIC          Reviewed and updated as needed this visit by clinical staffTobacco  Allergies  Med Hx  Surg Hx  Fam Hx  Soc Hx      Reviewed and updated as needed this visit by Provider         ROS:  Constitutional, HEENT, cardiovascular, pulmonary, GI, , musculoskeletal, neuro, skin, endocrine and psych systems are negative, except as otherwise noted.    This document serves as a record of the services and decisions personally performed and made by Diego Andrade MD. It was created on their behalf by Shon Kelly, a trained medical scribe. The creation of this document is based the provider's statements to the medical scribe.  Shon Kelly November 15, 2017 10:21 AM    "      OBJECTIVE:   /70 (Cuff Size: Adult Large)  Pulse 60  Temp 97.9  F (36.6  C) (Oral)  Ht 1.746 m (5' 8.75\")  Wt 108.4 kg (239 lb)  BMI 35.55 kg/m2  Body mass index is 35.55 kg/(m^2).  GENERAL: healthy, alert and no distress  HENT: ear canals and TM's normal, nose and mouth without ulcers or lesions  NECK: no adenopathy, no asymmetry, masses, or scars and thyroid normal to palpation  RESP: lungs clear to auscultation - no rales, rhonchi or wheezes  CV: regular rate and rhythm, normal S1 S2, no S3 or S4, no murmur, click or rub  MS: no gross musculoskeletal defects noted, no edema  SKIN: no suspicious lesions or rashes  PSYCH: mentation appears normal, affect normal/bright  Diabetic foot exam: normal DP and PT pulses and no trophic changes or ulcerative lesions    Diagnostic Test Results:  No results found for this or any previous visit (from the past 24 hour(s)).    ASSESSMENT/PLAN:   (E11.59) Type 2 diabetes mellitus with other circulatory complications (CODE) (H)  (primary encounter diagnosis)  Comment: previous A1c of 5.4, awaiting labs today.    Plan: FOOT EXAM  NO CHARGE [92349.114], CREATININE,         HEMOGLOBIN A1C, Lipid panel reflex to direct         LDL Fasting, Albumin Random Urine Quantitative         with Creat Ratio, TSH WITH FREE T4 REFLEX        -follow up, pending results    (I10) Hypertension goal BP (blood pressure) < 140/90  Comment: Brought electronic blood pressure cuff, which seems to be running 10 points higher when compared to values obtained using manual sphygmomanometer. With suboptimally controlled blood pressure of 153/70 here, will increase vasotec to 20 mg.   Plan: enalapril (VASOTEC) 20 MG tablet        -start vasotect 20 mg        -continue SMBP     (Z11.59) Need for hepatitis C screening test  Comment:   Plan: Hepatitis C Screen Reflex to HCV RNA Quant and         Genotype        -follow up, pending results    (Z13.5) Screening for diabetic retinopathy  Comment: " recent eye exam, no evidence of macular degeneration, per patient  Plan:     (Z13.89) Screening for diabetic peripheral neuropathy  Comment: normal foot exam  Plan: FOOT EXAM  NO CHARGE [56301.114]        -monitor for acute changes    (E78.5) Hyperlipidemia LDL goal <70  Comment[facility]. Difficulty tolerating other statins due to myalgias. Has previously did not tolerate Atrovastatin. Give his hx of Coronary artery disease and diabetes we need to be aggressive with treatment. Prescription for crestor will be provided. He can start 2 times per week an see if he tolerates. Recheck lipids 2-3 months  Plan: rosuvastatin (CRESTOR) 20 MG tablet            (I25.810) Coronary artery disease involving coronary bypass graft of native heart without angina pectoris  Comment:   Plan: rosuvastatin (CRESTOR) 20 MG tablet, enalapril         (VASOTEC) 20 MG tablet            The information in this document, created by the medical scribe for me, accurately reflects the services I personally performed and the decisions made by me. I have reviewed and approved this document for accuracy prior to leaving the patient care area.    Ishan Andrade MD, MD  [facility]

## 2017-11-15 NOTE — LETTER
November 22, 2017      Andrew Torres  [address] [address]        Dear Andrew,       The results of your recent lab tests were within normal limits except for the cholesterol. We have faxed in a new order for your Crestor. Let me know if you are not karolina to get it. Enclosed is a copy of these results.  If you have any further questions or problems, please contact our office.       Sincerely,        Ishan Andrade MD    Results for orders placed or performed in visit on 11/15/17   Hepatitis C Screen Reflex to HCV RNA Quant and Genotype   Result Value Ref Range    Hepatitis C Antibody Nonreactive NR^Nonreactive   CREATININE   Result Value Ref Range    Creatinine 0.90 0.66 - 1.25 mg/dL    GFR Estimate 83 >60 mL/min/1.7m2    GFR Estimate If Black >90 >60 mL/min/1.7m2   HEMOGLOBIN A1C   Result Value Ref Range    Hemoglobin A1C 5.8 4.3 - 6.0 %   Lipid panel reflex to direct LDL Fasting   Result Value Ref Range    Cholesterol 223 (H) <200 mg/dL    Triglycerides 119 <150 mg/dL    HDL Cholesterol 48 >39 mg/dL    LDL Cholesterol Calculated 151 (H) <100 mg/dL    Non HDL Cholesterol 175 (H) <130 mg/dL   Albumin Random Urine Quantitative with Creat Ratio   Result Value Ref Range    Creatinine Urine 112 mg/dL    Albumin Urine mg/L 20 mg/L    Albumin Urine mg/g Cr 17.68 (H) 0 - 17 mg/g Cr   TSH WITH FREE T4 REFLEX   Result Value Ref Range    TSH 0.97 0.40 - 4.00 mU/L

## 2017-11-16 LAB
CHOLEST SERPL-MCNC: 223 MG/DL
CREAT SERPL-MCNC: 0.9 MG/DL (ref 0.66–1.25)
CREAT UR-MCNC: 112 MG/DL
GFR SERPL CREATININE-BSD FRML MDRD: 83 ML/MIN/1.7M2
HCV AB SERPL QL IA: NONREACTIVE
HDLC SERPL-MCNC: 48 MG/DL
LDLC SERPL CALC-MCNC: 151 MG/DL
MICROALBUMIN UR-MCNC: 20 MG/L
MICROALBUMIN/CREAT UR: 17.68 MG/G CR (ref 0–17)
NONHDLC SERPL-MCNC: 175 MG/DL
TRIGL SERPL-MCNC: 119 MG/DL
TSH SERPL DL<=0.005 MIU/L-ACNC: 0.97 MU/L (ref 0.4–4)

## 2017-12-08 ENCOUNTER — TELEPHONE (OUTPATIENT)
Dept: FAMILY MEDICINE | Facility: CLINIC | Age: 67
End: 2017-12-08

## 2017-12-08 NOTE — TELEPHONE ENCOUNTER
[facility] faxed 2nd request re: enalapril (VASOTEC) 20 MG tablet,   rosuvastatin (CRESTOR) 20 MG tablet    Crestor is non-formulary.  Pt must try Simvastatin or Lipitor.  Send a new prescription or notes discussing why this cannot be changed.  Will also need recent clinic notes regarding the need for this medication.  Also send recent Lipids and lft's.    Also need the clinic note from when enalapril was increased.

## 2017-12-11 PROBLEM — E78.5 HYPERLIPIDEMIA LDL GOAL <70: Status: ACTIVE | Noted: 2017-12-11

## 2017-12-11 NOTE — TELEPHONE ENCOUNTER
He did not tolerate Lipitor. He is high risk and needs to be on statin. LDL Goal less than 70. History of diabetes(now diet controlled), coronary artery disease with hx of bypass surgery, and HTN. We are recommending starting Crestor 2 times per week and if tolerates increase and also monitor lipids to make sure he is improving. Ok to send recent lipids, ok to send last OV note    Recent Labs   Lab Test  11/15/17   1112 09/29/15  08/18/14   1042  05/27/14   1027   CHOL  223*  119  130  194   HDL  48  38  52  36*   LDL  151*  63  47  113   TRIG  119  90  153*  224*   CHOLHDLRATIO   --    --   2.5  5.4*     Lab Results   Component Value Date    A1C 5.8 11/15/2017    A1C 5.4 11/09/2016    A1C 5.0 08/12/2015    A1C 5.6 07/07/2015    A1C 5.4 08/18/2014

## 2017-12-11 NOTE — TELEPHONE ENCOUNTER
Please send note below along with notes and results that Dr. Andrade specified below to patient's insurance company.    Myron Shields RN

## 2017-12-12 NOTE — TELEPHONE ENCOUNTER
Faxed 11/15/17 office visit and Dr. Andrade's note [facility] to [facility] at [phone]    Kat Couch CMA ([facility])

## 2018-02-18 ENCOUNTER — TRANSFERRED RECORDS (OUTPATIENT)
Dept: HEALTH INFORMATION MANAGEMENT | Facility: CLINIC | Age: 68
End: 2018-02-18

## 2018-02-21 ENCOUNTER — TRANSFERRED RECORDS (OUTPATIENT)
Dept: HEALTH INFORMATION MANAGEMENT | Facility: CLINIC | Age: 68
End: 2018-02-21

## 2018-03-15 ENCOUNTER — TELEPHONE (OUTPATIENT)
Dept: FAMILY MEDICINE | Facility: CLINIC | Age: 68
End: 2018-03-15

## 2018-03-15 NOTE — TELEPHONE ENCOUNTER
Reason for Call:  Other Request    Detailed comments: Requesting ocupational home care therapy. Frequencies, 1x a week for 1 week, 3x a week for 2 weeks, 2x a week for 2 weeks and 1x a week for 1 week. Please advise.     Phone Number Patient can be reached at: Other phone number:  [phone]    Best Time: Anytime    Can we leave a detailed message on this number? YES    Call taken on 3/15/2018 at 4:52 PM by Diamond Mccracken

## 2018-03-19 ENCOUNTER — TELEPHONE (OUTPATIENT)
Dept: FAMILY MEDICINE | Facility: CLINIC | Age: 68
End: 2018-03-19

## 2018-03-19 NOTE — TELEPHONE ENCOUNTER
Reason for Call:  Home Health Care    Shleby with [facility] called regarding (reason for call): verbal orders    Orders are needed for this patient. Eval and treatment of swallowing, 1 time a week for 1 week, 2 times a week for 3 weeks, beginning 3-16-18.    PT: N/A    OT: N/A    Skilled Nursing: N/A    Pt Provider: Dr. Andrade    Phone Number Homecare Nurse can be reached at: [phone]    Can we leave a detailed message on this number? YES    Phone number patient can be reached at: Other phone number:  N/A    Best Time: N/A    Thank you!  Grisel OTERO  Patient Representative  [facility]      Call taken on 3/19/2018 at 9:28 AM by Grisel Zamora

## 2018-03-21 ENCOUNTER — TELEPHONE (OUTPATIENT)
Dept: FAMILY MEDICINE | Facility: CLINIC | Age: 68
End: 2018-03-21

## 2018-03-21 ENCOUNTER — OFFICE VISIT (OUTPATIENT)
Dept: FAMILY MEDICINE | Facility: CLINIC | Age: 68
End: 2018-03-21
Payer: COMMERCIAL

## 2018-03-21 DIAGNOSIS — M79.645 THUMB PAIN, LEFT: ICD-10-CM

## 2018-03-21 DIAGNOSIS — K62.5 RECTAL BLEEDING: ICD-10-CM

## 2018-03-21 DIAGNOSIS — E11.59 TYPE 2 DIABETES MELLITUS WITH OTHER CIRCULATORY COMPLICATION, WITHOUT LONG-TERM CURRENT USE OF INSULIN (H): ICD-10-CM

## 2018-03-21 DIAGNOSIS — I10 ESSENTIAL HYPERTENSION WITH GOAL BLOOD PRESSURE LESS THAN 140/90: ICD-10-CM

## 2018-03-21 DIAGNOSIS — Z09 HOSPITAL DISCHARGE FOLLOW-UP: Primary | ICD-10-CM

## 2018-03-21 DIAGNOSIS — I25.810 CORONARY ARTERY DISEASE INVOLVING CORONARY BYPASS GRAFT OF NATIVE HEART WITHOUT ANGINA PECTORIS: ICD-10-CM

## 2018-03-21 DIAGNOSIS — E78.5 HYPERLIPIDEMIA LDL GOAL <70: ICD-10-CM

## 2018-03-21 DIAGNOSIS — Z86.73 HISTORY OF CVA (CEREBROVASCULAR ACCIDENT): ICD-10-CM

## 2018-03-21 LAB
ERYTHROCYTE [DISTWIDTH] IN BLOOD BY AUTOMATED COUNT: 12.1 % (ref 10–15)
HBA1C MFR BLD: 5.7 % (ref 4.3–6)
HCT VFR BLD AUTO: 40.4 % (ref 40–53)
HGB BLD-MCNC: 14 G/DL (ref 13.3–17.7)
MCH RBC QN AUTO: 31.7 PG (ref 26.5–33)
MCHC RBC AUTO-ENTMCNC: 34.7 G/DL (ref 31.5–36.5)
MCV RBC AUTO: 92 FL (ref 78–100)
PLATELET # BLD AUTO: 193 10E9/L (ref 150–450)
RBC # BLD AUTO: 4.41 10E12/L (ref 4.4–5.9)
WBC # BLD AUTO: 8.7 10E9/L (ref 4–11)

## 2018-03-21 PROCEDURE — 85027 COMPLETE CBC AUTOMATED: CPT | Performed by: FAMILY MEDICINE

## 2018-03-21 PROCEDURE — 83036 HEMOGLOBIN GLYCOSYLATED A1C: CPT | Performed by: FAMILY MEDICINE

## 2018-03-21 PROCEDURE — 99215 OFFICE O/P EST HI 40 MIN: CPT | Performed by: FAMILY MEDICINE

## 2018-03-21 PROCEDURE — 36415 COLL VENOUS BLD VENIPUNCTURE: CPT | Performed by: FAMILY MEDICINE

## 2018-03-21 PROCEDURE — 80048 BASIC METABOLIC PNL TOTAL CA: CPT | Performed by: FAMILY MEDICINE

## 2018-03-21 RX ORDER — ATORVASTATIN CALCIUM 10 MG/1
10 TABLET, FILM COATED ORAL DAILY
Qty: 90 TABLET | Refills: 1 | Status: SHIPPED | OUTPATIENT
Start: 2018-03-21 | End: 2018-10-29

## 2018-03-21 RX ORDER — CLOPIDOGREL BISULFATE 75 MG/1
75 TABLET ORAL DAILY
Qty: 90 TABLET | Refills: 3 | Status: SHIPPED | OUTPATIENT
Start: 2018-03-21 | End: 2019-04-08

## 2018-03-21 RX ORDER — PANTOPRAZOLE SODIUM 40 MG/1
40 TABLET, DELAYED RELEASE ORAL DAILY
Qty: 90 TABLET | Refills: 1 | Status: SHIPPED | OUTPATIENT
Start: 2018-03-21 | End: 2018-10-29

## 2018-03-21 RX ORDER — LISINOPRIL 10 MG/1
10 TABLET ORAL DAILY
Qty: 90 TABLET | Refills: 3 | Status: SHIPPED | OUTPATIENT
Start: 2018-03-21 | End: 2018-04-30

## 2018-03-21 RX ORDER — CARVEDILOL 25 MG/1
25 TABLET ORAL 2 TIMES DAILY WITH MEALS
Qty: 180 TABLET | Refills: 1 | Status: SHIPPED | OUTPATIENT
Start: 2018-03-21 | End: 2018-10-29

## 2018-03-21 RX ORDER — AMLODIPINE BESYLATE 10 MG/1
10 TABLET ORAL DAILY
Qty: 90 TABLET | Refills: 3 | Status: SHIPPED | OUTPATIENT
Start: 2018-03-21 | End: 2019-06-04

## 2018-03-21 NOTE — PATIENT INSTRUCTIONS
-tylenol, aleve, or advil for thumb pain    Orders Placed This Encounter     Basic metabolic panel     Hemoglobin A1c     Last Lab Result: Hemoglobin A1C (%)       Date                     Value                 11/15/2017               5.8              ----------     CBC with platelets     Last Lab Result: Hemoglobin (g/dL)       Date                     Value                 11/09/2016               14.0             ----------     ASPIRIN 81 PO     amLODIPine (NORVASC) 10 MG tablet     Sig: Take 1 tablet (10 mg) by mouth daily     Dispense:  90 tablet     Refill:  3     atorvastatin (LIPITOR) 10 MG tablet     Sig: Take 1 tablet (10 mg) by mouth daily     Dispense:  90 tablet     Refill:  1     lisinopril (PRINIVIL/ZESTRIL) 10 MG tablet     Sig: Take 1 tablet (10 mg) by mouth daily     Dispense:  90 tablet     Refill:  3     pantoprazole (PROTONIX) 40 MG EC tablet     Sig: Take 1 tablet (40 mg) by mouth daily Take 30-60 minutes before a meal.     Dispense:  90 tablet     Refill:  1     carvedilol (COREG) 25 MG tablet     Sig: Take 1 tablet (25 mg) by mouth 2 times daily (with meals)     Dispense:  180 tablet     Refill:  1     Follow up  1 month for recheck

## 2018-03-21 NOTE — TELEPHONE ENCOUNTER
Forms received from: [facility]    Phone number listed: [phone]   Fax listed: [phone]  Date received: 3-21-18  Form description: PT 1week, 3week2, 2week2, 1week1. OT 1week1, 3week2, 2week2, 1week1 ST 1week1, 2week3   Once forms are completed, please return to [facility]  via fax.  Is patient requesting to be contacted when forms are completed: n/a  Form placed: Provider folder    Hazel Ryan

## 2018-03-21 NOTE — PROGRESS NOTES
SUBJECTIVE:   Andrew Torres is a 68 year old male who presents to clinic today for the following health issues:          Hospital Follow-up Visit:    Hospital/Nursing Home/IP Rehab Facility: [facility]  Date of Admission: 2/18/18  Date of Discharge: 2/21/18  Reason(s) for Admission: Stroke    Patient was then discharged to [facility] on 2/21/18 until 3/14/18.            Problems taking medications regularly:  None       Medication changes since discharge: Yes - updated meds are in [facility] site.       Problems adhering to non-medication therapy:  None    Summary of hospitalization:  [facility] information obtained and reviewed  Diagnostic Tests/Treatments reviewed.  Follow up needed: none  Other Healthcare Providers Involved in Patient s Care:         occupational therapy  Update since discharge: improved.     Post Discharge Medication Reconciliation: discharge medications reconciled, continue medications without change.  Plan of care communicated with patient     Coding guidelines for this visit:  Type of Medical   Decision Making Face-to-Face Visit       within 7 Days of discharge Face-to-Face Visit        within 14 days of discharge   Moderate Complexity 37210 [id number]   High Complexity [id number]          He had presented to the ER with 3 hour onset of garbled speech and left facial droop. CT scan of head showed large acute periventricular lacunar infarct. He wasn't a candidate for plasminogen given onset of >3 hours. He received inpatient physical rehabilitation with improved weakness. He was also found to be hypertensive  Discharged to TCU. Family notes that he was having difficulties standing up and transferring to the bathroom at that time for which he continued to receive physical therapy. He is now back home. Has been involved in occupational home care therapy three times a week to address residual left-sided hemiparesis. Speech therapy had mentioned that he has difficulties with  "dysphagia. Diet was had been modified, but he's back to normal diet. Daughter had reported that patient has been having some wheezing post-exertion.     GI bleed. Daughter notes that patient has been having blood in the perirectal area and in the toilet bowl, sometimes \"dripping\" blood. No recent diarrhea or constipation.     Thumb pain. Complaining of pain in left thumb for the last couple of weeks. Symptoms are unprecedented.       Problem list and histories reviewed & adjusted, as indicated.  Additional history: as documented    Patient Active Problem List   Diagnosis     CAD (coronary artery disease) stent placed 2006- life long plavix recommended     Carotid stenosis stent placed right 2010     Advanced directives, counseling/discussion     Type 2 diabetes mellitus with other circulatory complications     Coronary artery disease involving coronary bypass graft of native heart without angina pectoris     Gastroesophageal reflux disease without esophagitis     Essential hypertension with goal blood pressure less than 140/90     Hyperlipidemia LDL goal <70     History reviewed. No pertinent surgical history.    Social History   Substance Use Topics     Smoking status: Never Smoker     Smokeless tobacco: Never Used     Alcohol use Yes      Comment: 2-3 drinks per week     Family History   Problem Relation Age of Onset     Hypertension Mother      Hypertension Father      HEART DISEASE Paternal Grandmother      CANCER Sister          Current Outpatient Prescriptions   Medication Sig Dispense Refill     ASPIRIN 81 PO        amLODIPine (NORVASC) 10 MG tablet Take 1 tablet (10 mg) by mouth daily 90 tablet 3     atorvastatin (LIPITOR) 10 MG tablet Take 1 tablet (10 mg) by mouth daily 90 tablet 1     lisinopril (PRINIVIL/ZESTRIL) 10 MG tablet Take 1 tablet (10 mg) by mouth daily 90 tablet 3     pantoprazole (PROTONIX) 40 MG EC tablet Take 1 tablet (40 mg) by mouth daily Take 30-60 minutes before a meal. 90 tablet 1     " carvedilol (COREG) 25 MG tablet Take 1 tablet (25 mg) by mouth 2 times daily (with meals) 180 tablet 1     order for DME Equipment being ordered: wrist brace with thumb left 1 Device 0     clopidogrel (PLAVIX) 75 MG tablet Take 1 tablet (75 mg) by mouth daily 90 tablet 3     nitroglycerin (NITROSTAT) 0.4 MG SL tablet 1 tablet now 2 tablet 0     omega-3 fatty acids (FISH OIL) 1200 MG capsule Take 1 capsule by mouth daily.       Cyanocobalamin (B-12) 1000 MCG CAPS Take  by mouth.       albuterol (PROAIR HFA/PROVENTIL HFA/VENTOLIN HFA) 108 (90 BASE) MCG/ACT Inhaler Inhale 2 puffs into the lungs every 6 hours as needed for shortness of breath / dyspnea or wheezing 1 Inhaler 0     [DISCONTINUED] amLODIPine (NORVASC) 5 MG tablet Take 1 tablet (5 mg) by mouth daily 90 tablet 3     [DISCONTINUED] carvedilol (COREG) 25 MG tablet Take 1 tablet (25 mg) by mouth 2 times daily (with meals) 180 tablet 3     Allergies   Allergen Reactions     Hmg-Coa-R Inhibitors Muscle Pain (Myalgia)     Sulfa Drugs      Recent Labs   Lab Test  03/21/18   1401  11/15/17   1112  11/15/17   1111 10/25/17 10/23/17 07/27/17  11/09/16   1123  11/17/15   0836 09/29/15   07/07/15   1230  08/18/14   1042  10/22/12   A1C  5.7   --   5.8   --    --   6.0  5.4   --    --    < >  5.6  5.4   < >  5.5   LDL   --   151*   --    --    --    --    --    --   63   --    --   47   < >  73   HDL   --   48   --    --    --    --    --    --   38   --    --   52   < >  48   TRIG   --   119   --    --    --    --    --    --   90   --    --   153*   < >  74   ALT   --    --    --    --   32   --    --   17   --    --    --    --    --   19   CR   --   0.90   --   0.9  0.8  1.0  0.89  0.78   --    < >  0.90  1.00   < >  1.3   GFRESTIMATED   --   83   --    --    --    --   85  >90  Non  GFR Calc     --    --   84  75   < >   --    GFRESTBLACK   --   >90   --    --    --    --   >90   GFR Calc    >90   GFR Calc     --    --  "  >90   GFR Calc    >90   GFR Calc     < >   --    POTASSIUM   --    --    --   3.8  3.7  4.2  3.9  4.2   --    < >  3.7  3.9   < >  4.3   TSH   --   0.97   --    --    --    --    --    --    --    --   0.60   --    < >   --     < > = values in this interval not displayed.      BP Readings from Last 3 Encounters:   03/21/18 158/72   11/15/17 153/70   10/31/17 148/79    Wt Readings from Last 3 Encounters:   03/21/18 101.6 kg (224 lb)   11/15/17 108.4 kg (239 lb)   10/31/17 108.4 kg (239 lb)                  Labs reviewed in EPIC    Reviewed and updated as needed this visit by clinical staff       Reviewed and updated as needed this visit by Provider         ROS:  Constitutional, HEENT, cardiovascular, pulmonary, GI, , musculoskeletal, neuro, skin, endocrine and psych systems are negative, except as otherwise noted.    This document serves as a record of the services and decisions personally performed and made by Diego Andrdae MD. It was created on their behalf by Shon Kelly, a trained medical scribe. The creation of this document is based the provider's statements to the medical scribe.  Shon Kelly March 21, 2018 3:58 PM       OBJECTIVE:     /72 (BP Location: Right arm, Cuff Size: Adult Large)  Pulse 55  Temp 97.6  F (36.4  C) (Oral)  Ht 1.746 m (5' 8.75\")  Wt 101.6 kg (224 lb)  SpO2 99%  BMI 33.32 kg/m2  Body mass index is 33.32 kg/(m^2).  GENERAL: healthy, alert and no distress  HENT: ear canals and TM's normal, nose and mouth without ulcers or lesions  NECK: no adenopathy, no asymmetry, masses, or scars and thyroid normal to palpation  RESP: lungs clear to auscultation - no rales, rhonchi or wheezes  CV: regular rate and rhythm, normal S1 S2, no S3 or S4, no murmur, click or rub, no peripheral edema and peripheral pulses strong  RECTAL (male): no skin breakdown, no signs of active bleeding, no masses, no hemorrhoids  MS: no gross musculoskeletal defects noted, no " edema -- tender over left MCP joint, weakness noted in the thumb  NEURO: mild left facial weakness, normal speech, decreased  strength on left hand  SKIN: no suspicious lesions or rashes  PSYCH: mentation appears normal, affect normal/bright    Diagnostic Test Results:  none     ASSESSMENT/PLAN:   (Z09) Hospital discharge follow-up  (primary encounter diagnosis)  Comment: Hospitalized due to acute right paraventricular cerebral infarct and hypertension. He was switched to Plavix for stroke prophylaxis and started on lisinopril.   Plan:     (I10) Essential hypertension with goal blood pressure less than 140/90  Comment: controlled home  Blood pressures have been improve and were stable at TCU  Plan: amLODIPine (NORVASC) 10 MG tablet, atorvastatin        (LIPITOR) 10 MG tablet, lisinopril         (PRINIVIL/ZESTRIL) 10 MG tablet, pantoprazole         (PROTONIX) 40 MG EC tablet, carvedilol (COREG)         25 MG tablet, Basic metabolic panel        -medications refilled, continue present medications    (I25.810) Coronary artery disease involving coronary bypass graft of native heart without angina pectoris  Comment: stable. Medications refilled.   Plan: amLODIPine (NORVASC) 10 MG tablet, atorvastatin        (LIPITOR) 10 MG tablet, lisinopril         (PRINIVIL/ZESTRIL) 10 MG tablet, pantoprazole         (PROTONIX) 40 MG EC tablet, carvedilol (COREG)         25 MG tablet, Basic metabolic panel,         clopidogrel (PLAVIX) 75 MG tablet        -medications refilled, continue present medications      (Z86.73) History of CVA (cerebrovascular accident)  Comment: left hemiparesis, improved with both inpatient and outpatient rehabilitation.   Plan: amLODIPine (NORVASC) 10 MG tablet, carvedilol         (COREG) 25 MG tablet, clopidogrel (PLAVIX) 75         MG tablet        -continue home care therapy        -continue present medications    (E78.5) Hyperlipidemia LDL goal <70  Comment:   Plan: atorvastatin (LIPITOR) 10 MG  tablet            (E11.59) Type 2 diabetes mellitus with other circulatory complication, without long-term current use of insulin (H)  Comment: A1c at 5.7%; controlled  Plan: Basic metabolic panel, Hemoglobin A1c        -continue present medications    (K62.5) Rectal bleeding  Comment: No active bleeding on digital rectal exam. Normal colonoscopy in 2017. Possible mild capillary bleeding from anticoagulant therapy.   Plan: CBC with platelets        -Advised family to monitor for recurrence. We could consider colonoscopy as our next step if there are more reported incidents.   Lab Results   Component Value Date    WBC 8.7 03/21/2018     Lab Results   Component Value Date    RBC 4.41 03/21/2018     Lab Results   Component Value Date    HGB 14.0 03/21/2018     Lab Results   Component Value Date    HCT 40.4 03/21/2018     No components found for: MCT  Lab Results   Component Value Date    MCV 92 03/21/2018     Lab Results   Component Value Date    MCH 31.7 03/21/2018     Lab Results   Component Value Date    MCHC 34.7 03/21/2018     Lab Results   Component Value Date    RDW 12.1 03/21/2018     Lab Results   Component Value Date     03/21/2018           (M79.645) Thumb pain, left  Comment: possibly arthritic problems vs weakness from recent stroke. Patient was provided with wrist brace   Plan: order for DME        -wear wrist brace        -pt to review symptoms with physical therapist    Length of visit was 50  minutes with more than 50 percent of that time used for reconciliation of medications, discussing medical concerns and education    The information in this document, created by the medical scribe for me, accurately reflects the services I personally performed and the decisions made by me. I have reviewed and approved this document for accuracy prior to leaving the patient care area.          Ishan Andrade MD, MD  [facility]

## 2018-03-21 NOTE — MR AVS SNAPSHOT
After Visit Summary   3/21/2018    Andrew Torres    MRN: [id number]           Patient Information     Date Of Birth          1950        Visit Information        Provider Department      3/21/2018 12:40 PM Ishan Andrade MD [facility]     Hospital discharge follow-up    -  1    Essential hypertension with goal blood pressure less than 140/90        Coronary artery disease involving coronary bypass graft of native heart without angina pectoris        History of CVA (cerebrovascular accident)        Hyperlipidemia LDL goal <70        Type 2 diabetes mellitus with other circulatory complication, without long-term current use of insulin (H)        Rectal bleeding          Care Instructions    -tylenol, aleve, or advil for thumb pain    Orders Placed This Encounter     Basic metabolic panel     Hemoglobin A1c     Last Lab Result: Hemoglobin A1C (%)       Date                     Value                 11/15/2017               5.8              ----------     CBC with platelets     Last Lab Result: Hemoglobin (g/dL)       Date                     Value                 11/09/2016               14.0             ----------     ASPIRIN 81 PO     amLODIPine (NORVASC) 10 MG tablet     Sig: Take 1 tablet (10 mg) by mouth daily     Dispense:  90 tablet     Refill:  3     atorvastatin (LIPITOR) 10 MG tablet     Sig: Take 1 tablet (10 mg) by mouth daily     Dispense:  90 tablet     Refill:  1     lisinopril (PRINIVIL/ZESTRIL) 10 MG tablet     Sig: Take 1 tablet (10 mg) by mouth daily     Dispense:  90 tablet     Refill:  3     pantoprazole (PROTONIX) 40 MG EC tablet     Sig: Take 1 tablet (40 mg) by mouth daily Take 30-60 minutes before a meal.     Dispense:  90 tablet     Refill:  1     carvedilol (COREG) 25 MG tablet     Sig: Take 1 tablet (25 mg) by mouth 2 times daily (with meals)     Dispense:  180 tablet     Refill:  1     Follow up  1 month for  "recheck          Follow-ups after your visit        Who to contact     If you have questions or need follow up information about today's clinic visit or your schedule please contact [facility] directly at [phone].  Normal or non-critical lab and imaging results will be communicated to you by MyChart, letter or phone within 4 business days after the clinic has received the results. If you do not hear from us within 7 days, please contact the clinic through MyChart or phone. If you have a critical or abnormal lab result, we will notify you by phone as soon as possible.  Submit refill requests through [employer] or call your pharmacy and they will forward the refill request to us. Please allow 3 business days for your refill to be completed.          Additional Information About Your Visit        [email or web address] Information     [employer] lets you send messages to your doctor, view your test results, renew your prescriptions, schedule appointments and more. To sign up, go to [email or web address] . Click on \"Log in\" on the left side of the screen, which will take you to the Welcome page. Then click on \"Sign up Now\" on the right side of the page.     You will be asked to enter the access code listed below, as well as some personal information. Please follow the directions to create your username and password.     Your access code is: [email or web address]  Expires: 2018  1:49 PM     Your access code will  in 90 days. If you need help or a new code, please call your [place] clinic or [phone].        Care EveryWhere ID     This is your Care EveryWhere ID. This could be used by other organizations to access your [place] medical records  [id number]        Your Vitals Were     Pulse Temperature Height Pulse Oximetry BMI (Body Mass Index)       55 97.6  F (36.4  C) (Oral) 5' 8.75\" (1.746 m) 99% 33.32 kg/m2        Blood Pressure from Last 3 Encounters:   18 158/72   11/15/17 153/70   10/31/17 " 148/79    Weight from Last 3 Encounters:   03/21/18 224 lb (101.6 kg)   11/15/17 239 lb (108.4 kg)   10/31/17 239 lb (108.4 kg)              We Performed the Following     Basic metabolic panel     CBC with platelets     Hemoglobin A1c          Today's Medication Changes          These changes are accurate as of 3/21/18  1:49 PM.  If you have any questions, ask your nurse or doctor.               Start taking these medicines.        Dose/Directions    atorvastatin 10 MG tablet   Commonly known as:  LIPITOR   Used for:  Essential hypertension with goal blood pressure less than 140/90, Coronary artery disease involving coronary bypass graft of native heart without angina pectoris, Hyperlipidemia LDL goal <70   Started by:  Ishan Andrade MD        Dose:  10 mg   Take 1 tablet (10 mg) by mouth daily   Quantity:  90 tablet   Refills:  1       carvedilol 25 MG tablet   Commonly known as:  COREG   Used for:  Essential hypertension with goal blood pressure less than 140/90, Coronary artery disease involving coronary bypass graft of native heart without angina pectoris, History of CVA (cerebrovascular accident)   Started by:  Ishan Andrade MD        Dose:  25 mg   Take 1 tablet (25 mg) by mouth 2 times daily (with meals)   Quantity:  180 tablet   Refills:  1       lisinopril 10 MG tablet   Commonly known as:  PRINIVIL/ZESTRIL   Used for:  Essential hypertension with goal blood pressure less than 140/90, Coronary artery disease involving coronary bypass graft of native heart without angina pectoris   Started by:  Ishan Andrade MD        Dose:  10 mg   Take 1 tablet (10 mg) by mouth daily   Quantity:  90 tablet   Refills:  3       pantoprazole 40 MG EC tablet   Commonly known as:  PROTONIX   Used for:  Essential hypertension with goal blood pressure less than 140/90, Coronary artery disease involving coronary bypass graft of native heart without angina pectoris   Started by:  Ishan Andrade  MD        Dose:  40 mg   Take 1 tablet (40 mg) by mouth daily Take 30-60 minutes before a meal.   Quantity:  90 tablet   Refills:  1         These medicines have changed or have updated prescriptions.        Dose/Directions    amLODIPine 10 MG tablet   Commonly known as:  NORVASC   This may have changed:    - medication strength  - how much to take   Used for:  Essential hypertension with goal blood pressure less than 140/90, Coronary artery disease involving coronary bypass graft of native heart without angina pectoris, History of CVA (cerebrovascular accident)   Changed by:  Ishan Andrade MD        Dose:  10 mg   Take 1 tablet (10 mg) by mouth daily   Quantity:  90 tablet   Refills:  3       ASPIRIN 81 PO   This may have changed:  Another medication with the same name was removed. Continue taking this medication, and follow the directions you see here.   Changed by:  Ishan Andrade MD        Refills:  0         Stop taking these medicines if you haven't already. Please contact your care team if you have questions.     enalapril 20 MG tablet   Commonly known as:  VASOTEC   Stopped by:  Ishan Andrade MD           rosuvastatin 20 MG tablet   Commonly known as:  CRESTOR   Stopped by:  Ishan Andrade MD                Where to get your medicines      [facility] [address] [place], [place] [address] [address] [facility] [place]  [address], [place], [place] [address]     Phone:  [phone]     amLODIPine 10 MG tablet    atorvastatin 10 MG tablet    carvedilol 25 MG tablet    lisinopril 10 MG tablet    pantoprazole 40 MG EC tablet                Primary Care Provider Office Phone # Fax #    Ishan Andrade [phone] [address] [facility] [address]        Equal Access to Services     TIGRE VANCE : Beny Grider, tre valle, qarosamaria jeffries. So  [facility] [phone].    ATENCIÓN: Si torrila cesar, tiene a huber disposición servicios gratuitos de asistencia lingüística. Ronal wolf [phone].    We comply with applicable federal civil rights laws and [place] laws. We do not discriminate on the basis of race, color, national origin, age, disability, sex, sexual orientation, or gender identity.            Thank you!     Thank you for choosing [facility]  for your care. Our goal is always to provide you with excellent care. Hearing back from our patients is one way we can continue to improve our services. Please take a few minutes to complete the written survey that you may receive in the mail after your visit with us. Thank you!             Your Updated Medication List - Protect others around you: Learn how to safely use, store and throw away your medicines at www.disposemymeds.org.          This list is accurate as of 3/21/18  1:49 PM.  Always use your most recent med list.                   Brand Name Dispense Instructions for use Diagnosis    albuterol 108 (90 BASE) MCG/ACT Inhaler    PROAIR HFA/PROVENTIL HFA/VENTOLIN HFA    1 Inhaler    Inhale 2 puffs into the lungs every 6 hours as needed for shortness of breath / dyspnea or wheezing    Cough       amLODIPine 10 MG tablet    NORVASC    90 tablet    Take 1 tablet (10 mg) by mouth daily    Essential hypertension with goal blood pressure less than 140/90, Coronary artery disease involving coronary bypass graft of native heart without angina pectoris, History of CVA (cerebrovascular accident)       ASPIRIN 81 PO           atorvastatin 10 MG tablet    LIPITOR    90 tablet    Take 1 tablet (10 mg) by mouth daily    Essential hypertension with goal blood pressure less than 140/90, Coronary artery disease involving coronary bypass graft of native heart without angina pectoris, Hyperlipidemia LDL goal <70       B-12 1000 MCG Caps      Take  by mouth.        carvedilol 25 MG tablet    COREG    180  tablet    Take 1 tablet (25 mg) by mouth 2 times daily (with meals)    Essential hypertension with goal blood pressure less than 140/90, Coronary artery disease involving coronary bypass graft of native heart without angina pectoris, History of CVA (cerebrovascular accident)       lisinopril 10 MG tablet    PRINIVIL/ZESTRIL    90 tablet    Take 1 tablet (10 mg) by mouth daily    Essential hypertension with goal blood pressure less than 140/90, Coronary artery disease involving coronary bypass graft of native heart without angina pectoris       nitroGLYcerin 0.4 MG sublingual tablet    NITROSTAT    2 tablet    1 tablet now    Chest pain, CAD (coronary artery disease)       omega-3 fatty acids 1200 MG capsule      Take 1 capsule by mouth daily.        pantoprazole 40 MG EC tablet    PROTONIX    90 tablet    Take 1 tablet (40 mg) by mouth daily Take 30-60 minutes before a meal.    Essential hypertension with goal blood pressure less than 140/90, Coronary artery disease involving coronary bypass graft of native heart without angina pectoris

## 2018-03-21 NOTE — TELEPHONE ENCOUNTER
Forms received from: [employer]    Phone number listed: [phone]   Fax listed: [phone]  Date received: 3-21-18  Form description: Patient Medication Profile   Once forms are completed, please return to [employer]  via fax.  Is patient requesting to be contacted when forms are completed: n/a  Form placed: Rn folder     Hazel Ryan

## 2018-03-21 NOTE — TELEPHONE ENCOUNTER
Some discrepencies-lisinopril and pantoprazole were never prescribed. Called and spoke with patient, he has an appt today and plans to bring his med list from the TCU. Will have Dr. Andrade review at appt.       Myron Shields RN

## 2018-03-22 ENCOUNTER — TELEPHONE (OUTPATIENT)
Dept: FAMILY MEDICINE | Facility: CLINIC | Age: 68
End: 2018-03-22

## 2018-03-22 VITALS
HEART RATE: 55 BPM | BODY MASS INDEX: 33.18 KG/M2 | DIASTOLIC BLOOD PRESSURE: 70 MMHG | HEIGHT: 69 IN | WEIGHT: 224 LBS | SYSTOLIC BLOOD PRESSURE: 139 MMHG | TEMPERATURE: 97.6 F | OXYGEN SATURATION: 99 %

## 2018-03-22 DIAGNOSIS — Z12.5 SPECIAL SCREENING FOR MALIGNANT NEOPLASM OF PROSTATE: ICD-10-CM

## 2018-03-22 DIAGNOSIS — R30.0 DYSURIA: Primary | ICD-10-CM

## 2018-03-22 LAB
ANION GAP SERPL CALCULATED.3IONS-SCNC: 2 MMOL/L (ref 3–14)
BUN SERPL-MCNC: 20 MG/DL (ref 7–30)
CALCIUM SERPL-MCNC: 9.7 MG/DL (ref 8.5–10.1)
CHLORIDE SERPL-SCNC: 107 MMOL/L (ref 94–109)
CO2 SERPL-SCNC: 34 MMOL/L (ref 20–32)
CREAT SERPL-MCNC: 1.24 MG/DL (ref 0.66–1.25)
GFR SERPL CREATININE-BSD FRML MDRD: 58 ML/MIN/1.7M2
GLUCOSE SERPL-MCNC: 96 MG/DL (ref 70–99)
POTASSIUM SERPL-SCNC: 4.6 MMOL/L (ref 3.4–5.3)
SODIUM SERPL-SCNC: 143 MMOL/L (ref 133–144)

## 2018-03-23 ENCOUNTER — TELEPHONE (OUTPATIENT)
Dept: FAMILY MEDICINE | Facility: CLINIC | Age: 68
End: 2018-03-23

## 2018-03-23 DIAGNOSIS — R13.10 DYSPHAGIA, UNSPECIFIED TYPE: ICD-10-CM

## 2018-03-23 DIAGNOSIS — Z86.73 HISTORY OF CVA (CEREBROVASCULAR ACCIDENT): Primary | ICD-10-CM

## 2018-03-23 NOTE — TELEPHONE ENCOUNTER
Andreia returns call to WENDY SAVAGE. She would like patient to have video swallow study because he is currently still drinking thickened liquids and she would like to evaluate to see if he can have regular liquids. Will route to provider to advise.     Myron Shields RN

## 2018-03-23 NOTE — TELEPHONE ENCOUNTER
Called and left gregor detailed VM asking for more information on why patient needs another swallow study.     Myron Shields RN

## 2018-03-23 NOTE — TELEPHONE ENCOUNTER
Called and reviewed labs with Daughter    All stable    She reports that he has had trouble with incontinance and forgot to review it with us yesterday. Noted since stroke so no acute changes      Advised she come in and get sterile cup to get UA at home and bring back given difficulties of transporting him.    I will also try to add PSA to yesterday's labs    Forward to lab and silver team so they are aware    Orders Placed This Encounter     **UA reflex to Microscopic FUTURE anytime     Standing Status:   Future     Standing Expiration Date:   3/22/2019     PSA, screen     Standing Status:   Future     Standing Expiration Date:   3/22/2019

## 2018-03-23 NOTE — TELEPHONE ENCOUNTER
Reason for Call:  Other     Detailed comments: Andreia is calling from [facility] and she is needing Dr Andrade do an order for patient to get another video Swallow study. Patient already had one about 1 month ago while in hospital.  Andreia is needing patient to get another one.  The Video Swallow Study can be done wherever [facility] does them. Any questions please call Andreia at [phone].    Phone Number Patient can be reached at: Other phone number:  Andreia/ [facility]/ [phone].    Best Time: any    Can we leave a detailed message on this number? YES    Call taken on 3/23/2018 at 10:13 AM by Nasima Fu

## 2018-03-26 NOTE — TELEPHONE ENCOUNTER
Spoke with Andreia from [facility], and it sounds like patient will need an order/referral to have swallow study completed through [facility].  Will route to PCP for order/referral.    Paloma Salinas RN

## 2018-03-26 NOTE — TELEPHONE ENCOUNTER
Ok to call and giver verbal order or swallow study. Are they able to do this or does it need to be orderd through our speech therapy program. He is being seen by an outside  home cre

## 2018-03-27 DIAGNOSIS — R30.0 DYSURIA: ICD-10-CM

## 2018-03-27 LAB
ALBUMIN UR-MCNC: NEGATIVE MG/DL
APPEARANCE UR: CLEAR
BILIRUB UR QL STRIP: NEGATIVE
COLOR UR AUTO: YELLOW
GLUCOSE UR STRIP-MCNC: NEGATIVE MG/DL
HGB UR QL STRIP: NEGATIVE
KETONES UR STRIP-MCNC: NEGATIVE MG/DL
LEUKOCYTE ESTERASE UR QL STRIP: NEGATIVE
NITRATE UR QL: NEGATIVE
PH UR STRIP: 6 PH (ref 5–7)
SOURCE: NORMAL
SP GR UR STRIP: 1.02 (ref 1–1.03)
UROBILINOGEN UR STRIP-ACNC: 1 EU/DL (ref 0.2–1)

## 2018-03-27 PROCEDURE — 81003 URINALYSIS AUTO W/O SCOPE: CPT | Performed by: FAMILY MEDICINE

## 2018-03-27 NOTE — TELEPHONE ENCOUNTER
Order placed    Orders Placed This Encounter     SPEECH THERAPY REFERRAL     Referral Type:   Therapeutic Services     They should get a call to set up the appontment

## 2018-03-28 ENCOUNTER — TELEPHONE (OUTPATIENT)
Dept: FAMILY MEDICINE | Facility: CLINIC | Age: 68
End: 2018-03-28

## 2018-03-28 NOTE — TELEPHONE ENCOUNTER
Please call patient and family and notify that urine test is normal. No signs of infection. If continued problems I would be happy to see him again for review. We may need to recheck his prostate     Diego Andrade MD

## 2018-03-28 NOTE — TELEPHONE ENCOUNTER
Attempt # 1    Called patient at home number.     Was call answered?  No.  Left message on voicemail with information to call me back.    Myron Shields RN

## 2018-03-28 NOTE — TELEPHONE ENCOUNTER
Spoke to patient no further questions at this time  Charlette Vieira,Clinic Rn  [facility] [place]

## 2018-03-28 NOTE — TELEPHONE ENCOUNTER
Patient's wife returned call to [facility]. Called back, patient is currently in PT. They would like us to call back later.    Myron Shields RN

## 2018-04-02 ENCOUNTER — TELEPHONE (OUTPATIENT)
Dept: FAMILY MEDICINE | Facility: CLINIC | Age: 68
End: 2018-04-02

## 2018-04-02 NOTE — TELEPHONE ENCOUNTER
Forms received from: [employer]    Phone number listed: [phone]   Fax listed: [phone]  Date received: 4-2-18  Form description: Home Health Certification   Once forms are completed, please return to [employer]  via fax.  Is patient requesting to be contacted when forms are completed: n/a  Form placed: Provider folder    Hazel Ryan

## 2018-04-03 DIAGNOSIS — Z53.9 DIAGNOSIS NOT YET DEFINED: Primary | ICD-10-CM

## 2018-04-03 PROCEDURE — G0180 MD CERTIFICATION HHA PATIENT: HCPCS | Performed by: FAMILY MEDICINE

## 2018-04-11 ENCOUNTER — TELEPHONE (OUTPATIENT)
Dept: FAMILY MEDICINE | Facility: CLINIC | Age: 68
End: 2018-04-11

## 2018-04-11 NOTE — TELEPHONE ENCOUNTER
This patient was discharged from [facility]  on 2-21-18.    Discharge Diagnosis: Acute CVA    Follow-up instructions: follow up with PCP after discharge from short term rehab     A follow-up visit has been scheduled.  4-30-18 @ 8:20AM with PCP     Number of ED/ER visits in the last 12 months:  3     Please follow-up with patient.    Hazel Ryan

## 2018-04-19 ENCOUNTER — TRANSFERRED RECORDS (OUTPATIENT)
Dept: HEALTH INFORMATION MANAGEMENT | Facility: CLINIC | Age: 68
End: 2018-04-19

## 2018-04-21 ENCOUNTER — TRANSFERRED RECORDS (OUTPATIENT)
Dept: HEALTH INFORMATION MANAGEMENT | Facility: CLINIC | Age: 68
End: 2018-04-21

## 2018-04-24 ENCOUNTER — TELEPHONE (OUTPATIENT)
Dept: FAMILY MEDICINE | Facility: CLINIC | Age: 68
End: 2018-04-24

## 2018-04-24 NOTE — TELEPHONE ENCOUNTER
Routing to covering providers. Order sheet placed in silver RN form folder for covering provider to sign.    Myron Shields RN

## 2018-04-24 NOTE — TELEPHONE ENCOUNTER
Reason for Call: Request for an order or referral:    Order or referral being requested: PT & OT    Date needed: as soon as possible    Has the patient been seen by the PCP for this problem? YES    Additional comments: Lars from [facility] called to request the following orders below:    PT eval for strengthening, mobility, and gait training  OT Therapy for left hand pain and tendonitis    Since Dr. Andrade is out, Lars asked if there is any other provider who could complete these orders and fax back today. Please fax to [phone].    Phone number Patient can be reached at:  Lars: [phone]    Best Time:  anytime    Can we leave a detailed message on this number?  YES    Call taken on 4/24/2018 at 8:01 AM by Aliyah Baron

## 2018-04-26 ENCOUNTER — THERAPY VISIT (OUTPATIENT)
Dept: SPEECH THERAPY | Facility: CLINIC | Age: 68
End: 2018-04-26
Attending: FAMILY MEDICINE
Payer: COMMERCIAL

## 2018-04-26 ENCOUNTER — RADIANT APPOINTMENT (OUTPATIENT)
Dept: GENERAL RADIOLOGY | Facility: CLINIC | Age: 68
End: 2018-04-26
Attending: FAMILY MEDICINE
Payer: COMMERCIAL

## 2018-04-26 DIAGNOSIS — R13.10 DYSPHAGIA, UNSPECIFIED TYPE: Primary | ICD-10-CM

## 2018-04-26 DIAGNOSIS — R13.10 DYSPHAGIA, UNSPECIFIED TYPE: ICD-10-CM

## 2018-04-26 RX ORDER — BARIUM SULFATE 400 MG/ML
SUSPENSION ORAL ONCE
Status: ACTIVE | OUTPATIENT
Start: 2018-04-26

## 2018-04-26 NOTE — PROGRESS NOTES
04/26/18 1400   General Information   Type Of Visit Initial   Start Of Care Date 04/26/18   Referring Physician Tom Andrade   Orders Evaluate And Treat   Orders Comment video swallow study   Medical Diagnosis s/p R CVA; dysphagia unspecified   Onset Of Illness/injury Or Date Of Surgery 02/18/18   Precautions/limitations Fall Precautions   Hearing WFL   Pertinent History of Current Problem/OT: Additional Occupational Profile Info Pt states he had a stroke in February of this year.  Recently completed home care rehab therapies.  He reports eating and drinking regular foods and liqiuds and no problems with swallowing.   Respiratory Status Room air   Patient Role/employment History Retired   Living Environment [place]   General Observations Pt agreeable to eval; walking with a cane; L sided body weakness   Patient/family Goals To make sure everything is going the right way with swallowing   Clinical Swallow Evaluation   Oral Musculature generally intact   Structural Abnormalities none present   Dentition present and adequate   Mucosal Quality adequate   Mandibular Strength and Mobility intact   Oral Labial Strength and Mobility WFL   Lingual Strength and Mobility WFL   Velar Elevation intact   Buccal Strength and Mobility intact   Laryngeal Function Throat clear;Swallow   Oral Musculature Comments Mild L facial weakness; decreased ROM on L   Additional evaluation(s) completed today Yes   Rationale for completing additional evaluation VFSS indicated to assess oral pharyngeal phases of swallow function to rule out aspiration.   VFSS Evaluation   VFSS Additional Documentation Yes   VFSS Eval: Thin Liquid Texture Trial   Mode of Presentation, Thin Liquid cup   Order of Presentation 3,4   Preparatory Phase WFL   Oral Phase, Thin Liquid Premature pharyngeal entry   Pharyngeal Phase, Thin Liquid Delayed swallow reflex  (mild)   Rosenbek's Penetration Aspiration Scale: Thin Liquid Trial Results 2 - contrast enters  airway, remains above the vocal cords, no residue remains (penetration)   Diagnostic Statement Penetration only with single sip and multiple swallows of liquid.  Pt clearing throat after the swallow.   VFSS Eval: Puree Solid Texture Trial   Mode of Presentation, Puree spoon   Order of Presentation 1   Preparatory Phase WFL   Oral Phase, Puree WFL   Pharyngeal Phase, Puree WFL   Rosenbek's Penetration Aspiration Scale: Puree Food Trial Results 1 - no aspiration, contrast does not enter airway   Diagnostic Statement Normal timely swallow; no residue   VFSS Eval: Semisolid Texture Trial   Mode of Presentation, Semisolid self-fed   Order of Presentation 2   Preparatory Phase WFL   Oral Phase, Semisolid WFL   Pharyngeal Phase, Semisolid WFL   Rosenbek's Penetration Aspiration Scale: Semisolid Food Trial Results 1 - no aspiration, contrast does not enter airway   Diagnostic Statement Normal timely swallow   VFSS Eval: Solid Food Texture Trial   Mode of Presentation, Solid self-fed  (barium pill)   Order of Presentation 5   Preparatory Phase WFL   Oral Phase, Solid WFL   Pharyngeal Phase, Solid WFL   Rosenbek's Penetration Aspiration Scale: Solid Food Trial Results 1 - no aspiration, contrast does not enter airway   Diagnostic Statement Normal timely swallow   Swallow Compensations   Swallow Compensations No compensations were used   Educational Assessment   Barriers to Learning No barriers   Esophageal Phase of Swallow   Patient reports or presents with symptoms of esophageal dysphagia No   General Therapy Interventions   Intervention Comments Evaluation only   Swallow Eval: Clinical Impressions   Skilled Criteria for Therapy Intervention No problems identified which require skilled intervention   Treatment Diagnosis Mild oral pharyngeal dysphagia for liquids   Diet texture recommendations Regular diet;Thin liquids   Recommended Feeding/Eating Techniques no straws;maintain upright posture during/after eating for 30  mins  (mindful eating and drinking)   Risks and Benefits of Treatment have been explained. Yes   Clinical Impression Comments Mild oral pharyngeal dysphagia secondary mild weakness and swallow delay for liquids.  Appropriate sensory response (ie throat clear).  Recommended to patient he does not use a straw and mindful eating and drinking.   Total Session Time   Total Session Time 20   Total Evaluation Time 20

## 2018-04-26 NOTE — MR AVS SNAPSHOT
After Visit Summary   2018    Andrew Torres    MRN: [id number]           Patient Information     Date Of Birth          1950        Visit Information        Provider Department      2018 11:00 AM Amalia Johnson SLP [facility]        Today's Diagnoses     Dysphagia, unspecified type    -  1       Follow-ups after your visit        Your next 10 appointments already scheduled     2018  2:20 PM CDT   SHORT with Ishan Andrade MD   [facility] ([facility]    [address] [place] [id number] [phone]              Who to contact     Please call your clinic at [phone] to:    Ask questions about your health    Make or cancel appointments    Discuss your medicines    Learn about your test results    Speak to your doctor            Additional Information About Your Visit        MyChart Information     [facility] is an electronic gateway that provides easy, online access to your medical records. With [facility], you can request a clinic appointment, read your test results, renew a prescription or communicate with your care team.     To sign up for [employer] visit the website at [email or web address]   You will be asked to enter the access code listed below, as well as some personal information. Please follow the directions to create your username and password.     Your access code is: [email or web address]  Expires: 2018  1:49 PM     Your access code will  in 90 days. If you need help or a new code, please contact your [facility] or call [phone] for assistance.        Care EveryWhere ID     This is your Care EveryWhere ID. This could be used by other organizations to access your [place] medical records  [id number]         Blood Pressure from Last 3 Encounters:   18 139/70   11/15/17 153/70   10/31/17 148/79    Weight from Last 3 Encounters:   18 101.6 kg (224 lb)    11/15/17 108.4 kg (239 lb)   10/31/17 108.4 kg (239 lb)              Today, you had the following     No orders found for display       Primary Care Provider Office Phone # Fax #    Ishan Andrade [phone] [address] [facility] [address]        Equal Access to Services     [facility]: Hadii aad ku hadasho Soomaali, waaxda luqadaha, qaybta kaalmada adeegyada, waxay idiin hayaan adeeg gabbybobymartina laannie . So [facility] [phone].    ATENCIÓN: Si habla español, tiene a huber disposición servicios gratuitos de asistencia lingüística. Rogerioame al [phone].    We comply with applicable federal civil rights laws and [place] laws. We do not discriminate on the basis of race, color, national origin, age, disability, sex, sexual orientation, or gender identity.            Thank you!     Thank you for choosing [facility]  for your care. Our goal is always to provide you with excellent care. Hearing back from our patients is one way we can continue to improve our services. Please take a few minutes to complete the written survey that you may receive in the mail after your visit with us. Thank you!             Your Updated Medication List - Protect others around you: Learn how to safely use, store and throw away your medicines at www.disposemymeds.org.          This list is accurate as of 4/26/18  2:56 PM.  Always use your most recent med list.                   Brand Name Dispense Instructions for use Diagnosis    albuterol 108 (90 Base) MCG/ACT Inhaler    PROAIR HFA/PROVENTIL HFA/VENTOLIN HFA    1 Inhaler    Inhale 2 puffs into the lungs every 6 hours as needed for shortness of breath / dyspnea or wheezing    Cough       amLODIPine 10 MG tablet    NORVASC    90 tablet    Take 1 tablet (10 mg) by mouth daily    Essential hypertension with goal blood pressure less than 140/90, Coronary artery disease involving coronary bypass graft of native heart without angina pectoris, History of  CVA (cerebrovascular accident)       ASPIRIN 81 PO           atorvastatin 10 MG tablet    LIPITOR    90 tablet    Take 1 tablet (10 mg) by mouth daily    Essential hypertension with goal blood pressure less than 140/90, Coronary artery disease involving coronary bypass graft of native heart without angina pectoris, Hyperlipidemia LDL goal <70       B-12 1000 MCG Caps      Take  by mouth.        carvedilol 25 MG tablet    COREG    180 tablet    Take 1 tablet (25 mg) by mouth 2 times daily (with meals)    Essential hypertension with goal blood pressure less than 140/90, Coronary artery disease involving coronary bypass graft of native heart without angina pectoris, History of CVA (cerebrovascular accident)       clopidogrel 75 MG tablet    PLAVIX    90 tablet    Take 1 tablet (75 mg) by mouth daily    Coronary artery disease involving coronary bypass graft of native heart without angina pectoris, History of CVA (cerebrovascular accident)       lisinopril 10 MG tablet    PRINIVIL/ZESTRIL    90 tablet    Take 1 tablet (10 mg) by mouth daily    Essential hypertension with goal blood pressure less than 140/90, Coronary artery disease involving coronary bypass graft of native heart without angina pectoris       nitroGLYcerin 0.4 MG sublingual tablet    NITROSTAT    2 tablet    1 tablet now    Chest pain, CAD (coronary artery disease)       omega-3 fatty acids 1200 MG capsule      Take 1 capsule by mouth daily.        order for DME     1 Device    Equipment being ordered: wrist brace with thumb left    Thumb pain, left       pantoprazole 40 MG EC tablet    PROTONIX    90 tablet    Take 1 tablet (40 mg) by mouth daily Take 30-60 minutes before a meal.    Essential hypertension with goal blood pressure less than 140/90, Coronary artery disease involving coronary bypass graft of native heart without angina pectoris

## 2018-04-27 ENCOUNTER — TRANSFERRED RECORDS (OUTPATIENT)
Dept: HEALTH INFORMATION MANAGEMENT | Facility: CLINIC | Age: 68
End: 2018-04-27

## 2018-04-30 ENCOUNTER — OFFICE VISIT (OUTPATIENT)
Dept: FAMILY MEDICINE | Facility: CLINIC | Age: 68
End: 2018-04-30
Payer: COMMERCIAL

## 2018-04-30 ENCOUNTER — RADIANT APPOINTMENT (OUTPATIENT)
Dept: GENERAL RADIOLOGY | Facility: CLINIC | Age: 68
End: 2018-04-30
Attending: FAMILY MEDICINE
Payer: COMMERCIAL

## 2018-04-30 VITALS
TEMPERATURE: 98 F | HEART RATE: 52 BPM | SYSTOLIC BLOOD PRESSURE: 128 MMHG | DIASTOLIC BLOOD PRESSURE: 62 MMHG | WEIGHT: 218.4 LBS | BODY MASS INDEX: 32.49 KG/M2

## 2018-04-30 DIAGNOSIS — I25.810 CORONARY ARTERY DISEASE INVOLVING CORONARY BYPASS GRAFT OF NATIVE HEART WITHOUT ANGINA PECTORIS: ICD-10-CM

## 2018-04-30 DIAGNOSIS — R63.4 WEIGHT LOSS: ICD-10-CM

## 2018-04-30 DIAGNOSIS — I10 ESSENTIAL HYPERTENSION WITH GOAL BLOOD PRESSURE LESS THAN 140/90: ICD-10-CM

## 2018-04-30 DIAGNOSIS — M79.645 PAIN OF FINGER OF LEFT HAND: ICD-10-CM

## 2018-04-30 DIAGNOSIS — N20.0 CALCULUS OF KIDNEY: Primary | ICD-10-CM

## 2018-04-30 DIAGNOSIS — Z86.73 HISTORY OF CVA (CEREBROVASCULAR ACCIDENT): ICD-10-CM

## 2018-04-30 PROCEDURE — 36415 COLL VENOUS BLD VENIPUNCTURE: CPT | Performed by: FAMILY MEDICINE

## 2018-04-30 PROCEDURE — 80048 BASIC METABOLIC PNL TOTAL CA: CPT | Performed by: FAMILY MEDICINE

## 2018-04-30 PROCEDURE — 99000 SPECIMEN HANDLING OFFICE-LAB: CPT | Performed by: FAMILY MEDICINE

## 2018-04-30 PROCEDURE — 73130 X-RAY EXAM OF HAND: CPT | Mod: LT

## 2018-04-30 PROCEDURE — 82365 CALCULUS SPECTROSCOPY: CPT | Mod: 90 | Performed by: FAMILY MEDICINE

## 2018-04-30 PROCEDURE — 99214 OFFICE O/P EST MOD 30 MIN: CPT | Performed by: FAMILY MEDICINE

## 2018-04-30 RX ORDER — LISINOPRIL 10 MG/1
10 TABLET ORAL 2 TIMES DAILY
Qty: 180 TABLET | Refills: 3 | Status: SHIPPED | OUTPATIENT
Start: 2018-04-30 | End: 2019-04-29

## 2018-04-30 NOTE — PROGRESS NOTES
SUBJECTIVE:   Andrew Torres is a 68 year old male who presents to clinic today for the following health issues:      Follow up from Stroke  ED/ Followup:    Facility:  [facility]   Date of visit: 4/21/18  Reason for visit: Stomach pains-Kidney stones  Current Status:      Kidney Stones:  Notes reviewed in care everywhere. Today he brings in kidney stones in a sample cup that he passed on his own. He says that he was experiencing pain on the right side of his abdomen that was very intense, but it has since resolved. He is currently trying to increase his fluid intake. He was given Flomax while in the hospital, but only 5 days worth according to his daughter.    Taste:  His daughter states that he has not been eating very much since after the stroke, and reports that foods taste tasteless. He says that he snacks throughout the day, but he does not eat very much. He also does not drink very much water either according to his wife and daughter.    Urination:  He also states that he has issues with urination, and urinates a lot throughout the day if he drinks fluids. He says that it is most prevalent in the morning. He says that his urine flows well when he urinates.    Medications:  His daughter states that the lisinopril order was written incorrectly, and he takes 2 tablets   His daughter also says that he needs to be on Plavix in order to get surgery on his eyelids. We discussed asking the surgeon about taking aspirin.     Left Hand:  His left hand has also been painful and swollen. He does not identify one point in his hand that hurts more than others. His daughter says that he had a therapeutic glove that brought swelling down, but he has not recently been wearing the glove. Also discussed keeping the arm elevated. His daughter states that he has not been doing therapy due to the pain in his left hand.    Driving:  His daughter also asks about driving evaluations for him, and we discussed the [facility] in [place].     Speech:  He states that speech therapy has been going well.      Problem list and histories reviewed & adjusted, as indicated.  Additional history: as documented    Patient Active Problem List   Diagnosis     CAD (coronary artery disease) stent placed 2006- life long plavix recommended     Carotid stenosis stent placed right 2010     Advanced directives, counseling/discussion     Type 2 diabetes mellitus with other circulatory complications     Coronary artery disease involving coronary bypass graft of native heart without angina pectoris     Gastroesophageal reflux disease without esophagitis     Essential hypertension with goal blood pressure less than 140/90     Hyperlipidemia LDL goal <70     History reviewed. No pertinent surgical history.    Social History   Substance Use Topics     Smoking status: Never Smoker     Smokeless tobacco: Never Used     Alcohol use Yes      Comment: 2-3 drinks per week     Family History   Problem Relation Age of Onset     Hypertension Mother      Hypertension Father      HEART DISEASE Paternal Grandmother      CANCER Sister          Current Outpatient Prescriptions   Medication Sig Dispense Refill     amLODIPine (NORVASC) 10 MG tablet Take 1 tablet (10 mg) by mouth daily 90 tablet 3     ASPIRIN 81 PO        atorvastatin (LIPITOR) 10 MG tablet Take 1 tablet (10 mg) by mouth daily 90 tablet 1     carvedilol (COREG) 25 MG tablet Take 1 tablet (25 mg) by mouth 2 times daily (with meals) 180 tablet 1     clopidogrel (PLAVIX) 75 MG tablet Take 1 tablet (75 mg) by mouth daily 90 tablet 3     Cyanocobalamin (B-12) 1000 MCG CAPS Take  by mouth.       lisinopril (PRINIVIL/ZESTRIL) 10 MG tablet Take 1 tablet (10 mg) by mouth daily (Patient taking differently: Take 10 mg by mouth 2 times daily ) 90 tablet 3     nitroglycerin (NITROSTAT) 0.4 MG SL tablet 1 tablet now 2 tablet 0     omega-3 fatty acids (FISH OIL) 1200 MG capsule Take 1 capsule by mouth daily.        order for DME Equipment being ordered: wrist brace with thumb left 1 Device 0     pantoprazole (PROTONIX) 40 MG EC tablet Take 1 tablet (40 mg) by mouth daily Take 30-60 minutes before a meal. 90 tablet 1     albuterol (PROAIR HFA/PROVENTIL HFA/VENTOLIN HFA) 108 (90 BASE) MCG/ACT Inhaler Inhale 2 puffs into the lungs every 6 hours as needed for shortness of breath / dyspnea or wheezing (Patient not taking: Reported on 4/30/2018) 1 Inhaler 0     Allergies   Allergen Reactions     Hmg-Coa-R Inhibitors Muscle Pain (Myalgia)     Sulfa Drugs      Recent Labs   Lab Test  03/21/18   1401  11/15/17   1112  11/15/17   1111 10/25/17 10/23/17 07/27/17   11/17/15   0836 09/29/15   07/07/15   1230  08/18/14   1042  10/22/12   A1C  5.7   --   5.8   --    --   6.0   < >   --    --    < >  5.6  5.4   < >  5.5   LDL   --   151*   --    --    --    --    --    --   63   --    --   47   < >  73   HDL   --   48   --    --    --    --    --    --   38   --    --   52   < >  48   TRIG   --   119   --    --    --    --    --    --   90   --    --   153*   < >  74   ALT   --    --    --    --   32   --    --   17   --    --    --    --    --   19   CR  1.24  0.90   --   0.9  0.8  1.0   < >  0.78   --    < >  0.90  1.00   < >  1.3   GFRESTIMATED  58*  83   --    --    --    --    < >  >90  Non  GFR Calc     --    --   84  75   < >   --    GFRESTBLACK  70  >90   --    --    --    --    < >  >90   GFR Calc     --    --   >90   GFR Calc    >90   GFR Calc     < >   --    POTASSIUM  4.6   --    --   3.8  3.7  4.2   < >  4.2   --    < >  3.7  3.9   < >  4.3   TSH   --   0.97   --    --    --    --    --    --    --    --   0.60   --    < >   --     < > = values in this interval not displayed.      BP Readings from Last 3 Encounters:   04/30/18 128/62   03/21/18 139/70   11/15/17 153/70    Wt Readings from Last 3 Encounters:   04/30/18 99.1 kg (218 lb 6.4 oz)   03/21/18 101.6 kg  (224 lb)   11/15/17 108.4 kg (239 lb)        Reviewed and updated as needed this visit by clinical staff  Tobacco  Allergies  Meds  Med Hx  Surg Hx  Fam Hx  Soc Hx      Reviewed and updated as needed this visit by Provider       ROS:  CONSTITUTIONAL: NEGATIVE for fever, chills, change in weight  ENT/MOUTH: NEGATIVE for ear, mouth and throat problems  RESP: NEGATIVE for significant cough or SOB  CV: NEGATIVE for chest pain, palpitations or peripheral edema  ROS otherwise negative    This document serves as a record of the services and decisions personally performed and made by Ishan Andrade MD. It was created on his/her behalf by Silvia Howe, trained medical scribe. The creation of this document is based the provider's statements to the medical scribes.    Scribe Silvia Howe, April 30, 2018    OBJECTIVE:     /62 (BP Location: Right arm, Patient Position: Sitting, Cuff Size: Adult Regular)  Pulse 52  Temp 98  F (36.7  C) (Oral)  Wt 99.1 kg (218 lb 6.4 oz)  BMI 32.49 kg/m2  Body mass index is 32.49 kg/(m^2).  GENERAL: healthy, alert and no distress  HENT: ear canals and TM's normal, nose and mouth without ulcers or lesions  NECK: no adenopathy, no asymmetry, masses, or scars and thyroid normal to palpation  RESP: lungs clear to auscultation - no rales, rhonchi or wheezes  CV: regular rate and rhythm, normal S1 S2, no S3 or S4, no murmur, click or rub, peripheral pulses strong, edema present inleft arm and lef thand  ABDOMEN: soft, nontender, no hepatosplenomegaly, no masses and bowel sounds normal  MS:edema in left arm and left hand, point tenderness in hand  NEURO: Normal strength and tone, mentation intact and speech normal  PSYCH: mentation appears normal, affect normal/bright    Diagnostic Test Results:  none     ASSESSMENT/PLAN:   (N20.0) Calculus of kidney  (primary encounter diagnosis)  Comment: Visited the ED on 4/21/18 due to kidney stones. Brought in stones today, and will be  sent to lab for evaluation. Pain has resolved since passing the stones. Continue to monitor. Labs today   Plan: Basic metabolic panel, Stone analysis        Labs today, continue to monitor    (I10) Essential hypertension with goal blood pressure less than 140/90  Comment: Controlled.  Plan: lisinopril (PRINIVIL/ZESTRIL) 10 MG tablet        Continue medications, refills provided    (I25.810) Coronary artery disease involving coronary bypass graft of native heart without angina pectoris  Comment: Stable  Plan: lisinopril (PRINIVIL/ZESTRIL) 10 MG tablet        Continue to monitor, continue current medications    (M79.645) Pain of finger of left hand  Comment: Pain and edema in left hand and forearm. XR for further evaluation  Plan: XR Hand Left G/E 3 Views          (R63.4) Weight loss  Comment: Secondary to recent CVA. Loss of 6 pounds since stroke. Reports foods are tasteless. Order for Ensure or Boost written. Encouraged to increase caloric intake, specifically protein, and increase water intake.  Plan: order for DME     (Z86.73) History of CVA (cerebrovascular accident)  Comment: Stable, continue with PT  Plan: Continue with PT    (I63.9,  G81.94) Hemiparesis of left nondominant side due to cerebral infarction (H)  Comment: Stable, continue with PT, probable lymphedema of left hand  Plan: Continue with PT             Patient Instructions   1 ) Consume protein in the morning (eggs or other protein source) and try to increase caloric intake.  2 ) Drink 4 or more glasses of water/day.  3 ) Drink 2 cans of Ensure or Boost/day.  4 ) Ask the surgeon about taking aspirin instead of Plavix prior to eyelid surgery.  5 ) The [facility] in [place], [place] has driving evaluations.      The information in this document, created by the medical scribe for me, accurately reflects the services I personally performed and the decisions made by me. I have reviewed and approved this document for accuracy.  04/30/18    Ishan Andrade MD  [facility]

## 2018-04-30 NOTE — PATIENT INSTRUCTIONS
1 ) Consume protein in the morning (eggs or other protein source) and try to increase caloric intake.  2 ) Drink 4 or more glasses of water/day.  3 ) Drink 2 cans of Ensure or Boost/day.  4 ) Ask the surgeon about taking aspirin instead of Plavix prior to eyelid surgery.  5 ) The [facility] in [place], [place] has driving evaluations.

## 2018-04-30 NOTE — MR AVS SNAPSHOT
After Visit Summary   4/30/2018    Andrew Torres    MRN: [id number]           Patient Information     Date Of Birth          1950        Visit Information        Provider Department      4/30/2018 2:20 PM Ishan Andrade MD [facility]        Today's Diagnoses     Calculus of kidney    -  1    Essential hypertension with goal blood pressure less than 140/90        Coronary artery disease involving coronary bypass graft of native heart without angina pectoris        Pain of finger of left hand        Weight loss        History of CVA (cerebrovascular accident)        Hemiparesis of left nondominant side due to cerebral infarction (H)          Care Instructions    1 ) Consume protein in the morning (eggs or other protein source) and try to increase caloric intake.  2 ) Drink 4 or more glasses of water/day.  3 ) Drink 2 cans of Ensure or Boost/day.  4 ) Ask the surgeon about taking aspirin instead of Plavix prior to eyelid surgery.  5 ) The [employer] in [place], [place] has driving evaluations.          Follow-ups after your visit        Future tests that were ordered for you today     Open Future Orders        Priority Expected Expires Ordered    XR Hand Left G/E 3 Views Routine 4/30/2018 4/30/2019 4/30/2018    Stone analysis Routine  4/30/2019 4/30/2018            Who to contact     If you have questions or need follow up information about today's clinic visit or your schedule please contact [facility] directly at [phone].  Normal or non-critical lab and imaging results will be communicated to you by MyChart, letter or phone within 4 business days after the clinic has received the results. If you do not hear from us within 7 days, please contact the clinic through MyChart or phone. If you have a critical or abnormal lab result, we will notify you by phone as soon as possible.  Submit refill requests through [employer] or call your  "pharmacy and they will forward the refill request to us. Please allow 3 business days for your refill to be completed.          Additional Information About Your Visit        MyChart Information     [employer] lets you send messages to your doctor, view your test results, renew your prescriptions, schedule appointments and more. To sign up, go to [email or web address] . Click on \"Log in\" on the left side of the screen, which will take you to the Welcome page. Then click on \"Sign up Now\" on the right side of the page.     You will be asked to enter the access code listed below, as well as some personal information. Please follow the directions to create your username and password.     Your access code is: [email or web address]  Expires: 2018  1:49 PM     Your access code will  in 90 days. If you need help or a new code, please call your [facility] clinic or [phone].        Care EveryWhere ID     This is your Care EveryWhere ID. This could be used by other organizations to access your [facility] medical records  [id number]        Your Vitals Were     Pulse Temperature BMI (Body Mass Index)             52 98  F (36.7  C) (Oral) 32.49 kg/m2          Blood Pressure from Last 3 Encounters:   18 128/62   18 139/70   11/15/17 153/70    Weight from Last 3 Encounters:   18 218 lb 6.4 oz (99.1 kg)   18 224 lb (101.6 kg)   11/15/17 239 lb (108.4 kg)              We Performed the Following     Basic metabolic panel          Today's Medication Changes          These changes are accurate as of 18  3:25 PM.  If you have any questions, ask your nurse or doctor.               Start taking these medicines.        Dose/Directions    order for DME   Used for:  Weight loss   Started by:  Ishan Andrade MD        Equipment being ordered: Ensure supplement   Quantity:  60 Can   Refills:  11            Where to get your medicines      These medications were sent to [id number] - [place], " [place] - [address]  [address] [address]     Phone:  [phone]     lisinopril 10 MG tablet         Some of these will need a paper prescription and others can be bought over the counter.  Ask your nurse if you have questions.     Bring a paper prescription for each of these medications     order for DME                Primary Care Provider Office Phone # Fax #    Ishan Andrade [phone]       [facility] [address]        Equal Access to Services     [facility]: Hadii aad ku hadasho Soomaali, waaxda luqadaha, qaybta kaalmada adeegyada, [place] hayangie lucas . So [facility] [phone].    ATENCIÓN: Si habla español, tiene a huber disposición servicios gratuitos de asistencia lingüística. [facility] [phone].    We comply with applicable federal civil rights laws and [place] laws. We do not discriminate on the basis of race, color, national origin, age, disability, sex, sexual orientation, or gender identity.            Thank you!     Thank you for choosing [facility]  for your care. Our goal is always to provide you with excellent care. Hearing back from our patients is one way we can continue to improve our services. Please take a few minutes to complete the written survey that you may receive in the mail after your visit with us. Thank you!             Your Updated Medication List - Protect others around you: Learn how to safely use, store and throw away your medicines at www.disposemymeds.org.          This list is accurate as of 4/30/18  3:25 PM.  Always use your most recent med list.                   Brand Name Dispense Instructions for use Diagnosis    albuterol 108 (90 Base) MCG/ACT Inhaler    PROAIR HFA/PROVENTIL HFA/VENTOLIN HFA    1 Inhaler    Inhale 2 puffs into the lungs every 6 hours as needed for shortness of breath / dyspnea or wheezing    Cough       amLODIPine 10 MG tablet     NORVASC    90 tablet    Take 1 tablet (10 mg) by mouth daily    Essential hypertension with goal blood pressure less than 140/90, Coronary artery disease involving coronary bypass graft of native heart without angina pectoris, History of CVA (cerebrovascular accident)       ASPIRIN 81 PO           atorvastatin 10 MG tablet    LIPITOR    90 tablet    Take 1 tablet (10 mg) by mouth daily    Essential hypertension with goal blood pressure less than 140/90, Coronary artery disease involving coronary bypass graft of native heart without angina pectoris, Hyperlipidemia LDL goal <70       B-12 1000 MCG Caps      Take  by mouth.        carvedilol 25 MG tablet    COREG    180 tablet    Take 1 tablet (25 mg) by mouth 2 times daily (with meals)    Essential hypertension with goal blood pressure less than 140/90, Coronary artery disease involving coronary bypass graft of native heart without angina pectoris, History of CVA (cerebrovascular accident)       clopidogrel 75 MG tablet    PLAVIX    90 tablet    Take 1 tablet (75 mg) by mouth daily    Coronary artery disease involving coronary bypass graft of native heart without angina pectoris, History of CVA (cerebrovascular accident)       lisinopril 10 MG tablet    PRINIVIL/ZESTRIL    180 tablet    Take 1 tablet (10 mg) by mouth 2 times daily    Essential hypertension with goal blood pressure less than 140/90, Coronary artery disease involving coronary bypass graft of native heart without angina pectoris       nitroGLYcerin 0.4 MG sublingual tablet    NITROSTAT    2 tablet    1 tablet now    Chest pain, CAD (coronary artery disease)       omega-3 fatty acids 1200 MG capsule      Take 1 capsule by mouth daily.        order for DME     1 Device    Equipment being ordered: wrist brace with thumb left    Thumb pain, left       order for DME     60 Can    Equipment being ordered: Ensure supplement    Weight loss       pantoprazole 40 MG EC tablet    PROTONIX    90 tablet    Take 1  tablet (40 mg) by mouth daily Take 30-60 minutes before a meal.    Essential hypertension with goal blood pressure less than 140/90, Coronary artery disease involving coronary bypass graft of native heart without angina pectoris

## 2018-05-01 LAB
ANION GAP SERPL CALCULATED.3IONS-SCNC: 8 MMOL/L (ref 3–14)
BUN SERPL-MCNC: 12 MG/DL (ref 7–30)
CALCIUM SERPL-MCNC: 9.6 MG/DL (ref 8.5–10.1)
CHLORIDE SERPL-SCNC: 108 MMOL/L (ref 94–109)
CO2 SERPL-SCNC: 27 MMOL/L (ref 20–32)
CREAT SERPL-MCNC: 0.94 MG/DL (ref 0.66–1.25)
GFR SERPL CREATININE-BSD FRML MDRD: 80 ML/MIN/1.7M2
GLUCOSE SERPL-MCNC: 101 MG/DL (ref 70–99)
POTASSIUM SERPL-SCNC: 4.2 MMOL/L (ref 3.4–5.3)
SODIUM SERPL-SCNC: 143 MMOL/L (ref 133–144)

## 2018-05-03 ENCOUNTER — TELEPHONE (OUTPATIENT)
Dept: FAMILY MEDICINE | Facility: CLINIC | Age: 68
End: 2018-05-03

## 2018-05-03 NOTE — TELEPHONE ENCOUNTER
Reason for Call:  Other results of xray    Detailed comments: patients wife wanting to know the results of the xray report    Phone Number Patient can be reached at: Other phone number:  [phone]    Best Time: any    Can we leave a detailed message on this number? YES    Call taken on 5/3/2018 at 11:12 AM by MEGA REINOSO

## 2018-05-03 NOTE — TELEPHONE ENCOUNTER
Called and reviewed. No fractures. Arthritis noted and radiopaque findings. He was a  and did lots of manual labor. Doubt this is causing pain.

## 2018-05-03 NOTE — TELEPHONE ENCOUNTER
Imaging results not yet flagged as reviewed by provider.  Will forward to Dr. Andrade for review.    Dennys Shultz RN

## 2018-05-04 LAB
APPEARANCE STONE: NORMAL
COMPN STONE: NORMAL
NUMBER STONE: 2
SIZE STONE: NORMAL MM
WT STONE: 11 MG

## 2018-05-07 ENCOUNTER — TELEPHONE (OUTPATIENT)
Dept: FAMILY MEDICINE | Facility: CLINIC | Age: 68
End: 2018-05-07

## 2018-05-07 NOTE — TELEPHONE ENCOUNTER
Forms received from: [facility]    Phone number listed: n/a   Fax listed: [phone]  Date received: 5-7-18  Form description: OT and PT Evaluations  Once forms are completed, please return to [facility]  via fax.  Is patient requesting to be contacted when forms are completed: n/a  Form placed: Provider folder    Hazel Ryan

## 2018-05-15 ENCOUNTER — TELEPHONE (OUTPATIENT)
Dept: FAMILY MEDICINE | Facility: CLINIC | Age: 68
End: 2018-05-15

## 2018-05-15 DIAGNOSIS — N20.0 URIC ACID KIDNEY STONE: Primary | ICD-10-CM

## 2018-05-15 NOTE — TELEPHONE ENCOUNTER
Called and reviewed and we will get Uric acid next lab draw. Future order placed. Consider potasium citrate if he has recurrances

## 2018-05-15 NOTE — TELEPHONE ENCOUNTER
Called and left message. Uric and calcium stones.    I will call again. Would consider alkalanation with potassium citrate    Need to check uric acid

## 2018-05-16 DIAGNOSIS — N20.0 URIC ACID KIDNEY STONE: ICD-10-CM

## 2018-05-16 DIAGNOSIS — Z12.5 SPECIAL SCREENING FOR MALIGNANT NEOPLASM OF PROSTATE: ICD-10-CM

## 2018-05-16 PROCEDURE — G0103 PSA SCREENING: HCPCS | Performed by: FAMILY MEDICINE

## 2018-05-16 PROCEDURE — 84550 ASSAY OF BLOOD/URIC ACID: CPT | Performed by: FAMILY MEDICINE

## 2018-05-16 PROCEDURE — 36415 COLL VENOUS BLD VENIPUNCTURE: CPT | Performed by: FAMILY MEDICINE

## 2018-05-16 NOTE — LETTER
[facility]  [address] [id number]  [phone]        May 17, 2018    Andrew Torres  [address] [facility] [address]        Dear Andrew,    The results of your recent lab tests were within normal limits. Keeping hydrated is the most important thing to prevent further kidney stones. Enclosed is a copy of these results.  If you have any further questions or problems, please contact our office.    Results for orders placed or performed in visit on 05/16/18   PSA, screen   Result Value Ref Range    PSA 0.69 0 - 4 ug/L   Uric acid   Result Value Ref Range    Uric Acid 6.4 3.5 - 7.2 mg/dL       If you have any questions please call the clinic at [phone].    Sincerely,    Diego PADILLA

## 2018-05-17 ENCOUNTER — TELEPHONE (OUTPATIENT)
Dept: FAMILY MEDICINE | Facility: CLINIC | Age: 68
End: 2018-05-17

## 2018-05-17 LAB
PSA SERPL-ACNC: 0.69 UG/L (ref 0–4)
URATE SERPL-MCNC: 6.4 MG/DL (ref 3.5–7.2)

## 2018-06-02 ENCOUNTER — NURSE TRIAGE (OUTPATIENT)
Dept: NURSING | Facility: CLINIC | Age: 68
End: 2018-06-02

## 2018-06-02 NOTE — TELEPHONE ENCOUNTER
"  Reason for Disposition    [1] Caller requesting NON-URGENT health information AND [2] PCP's office is the best resource     \"I got a call a few days ago , I'm not sure what it was about. It said the [facility] clinic, it may have been from a pharmacy.\" Advised there is nothing per epic. Advised to call PCP on Monday when open to discuss. No triage was needed.    Additional Information    Negative: [1] Caller is not with the adult (patient) AND [2] reporting urgent symptoms    Negative: Lab result questions    Negative: Medication questions    Negative: Caller cannot be reached by phone    Negative: Caller has already spoken to PCP or another triager    Negative: RN needs further essential information from caller in order to complete triage    Negative: Requesting regular office appointment    Protocols used: INFORMATION ONLY CALL-ADULT-    "

## 2018-06-25 ENCOUNTER — OFFICE VISIT (OUTPATIENT)
Dept: FAMILY MEDICINE | Facility: CLINIC | Age: 68
End: 2018-06-25
Payer: COMMERCIAL

## 2018-06-25 VITALS
SYSTOLIC BLOOD PRESSURE: 138 MMHG | TEMPERATURE: 98 F | HEIGHT: 69 IN | DIASTOLIC BLOOD PRESSURE: 64 MMHG | HEART RATE: 56 BPM | BODY MASS INDEX: 31.4 KG/M2 | WEIGHT: 212 LBS

## 2018-06-25 DIAGNOSIS — Z23 NEED FOR PROPHYLACTIC VACCINATION AGAINST STREPTOCOCCUS PNEUMONIAE (PNEUMOCOCCUS): ICD-10-CM

## 2018-06-25 DIAGNOSIS — R63.4 LOSS OF WEIGHT: ICD-10-CM

## 2018-06-25 DIAGNOSIS — R30.0 DYSURIA: ICD-10-CM

## 2018-06-25 DIAGNOSIS — G81.94 LEFT HEMIPARESIS (H): ICD-10-CM

## 2018-06-25 DIAGNOSIS — Z86.73 HISTORY OF CVA (CEREBROVASCULAR ACCIDENT): ICD-10-CM

## 2018-06-25 DIAGNOSIS — Z13.5 SCREENING FOR DIABETIC RETINOPATHY: ICD-10-CM

## 2018-06-25 DIAGNOSIS — E78.5 HYPERLIPIDEMIA LDL GOAL <70: ICD-10-CM

## 2018-06-25 DIAGNOSIS — E11.59 TYPE 2 DIABETES MELLITUS WITH OTHER CIRCULATORY COMPLICATION, WITHOUT LONG-TERM CURRENT USE OF INSULIN (H): Primary | ICD-10-CM

## 2018-06-25 LAB
ALBUMIN UR-MCNC: 30 MG/DL
APPEARANCE UR: CLEAR
BACTERIA #/AREA URNS HPF: ABNORMAL /HPF
BILIRUB UR QL STRIP: ABNORMAL
COLOR UR AUTO: YELLOW
GLUCOSE UR STRIP-MCNC: NEGATIVE MG/DL
HBA1C MFR BLD: 5.3 % (ref 0–5.6)
HGB UR QL STRIP: NEGATIVE
KETONES UR STRIP-MCNC: NEGATIVE MG/DL
LEUKOCYTE ESTERASE UR QL STRIP: NEGATIVE
MUCOUS THREADS #/AREA URNS LPF: PRESENT /LPF
NITRATE UR QL: NEGATIVE
PH UR STRIP: 5.5 PH (ref 5–7)
RBC #/AREA URNS AUTO: ABNORMAL /HPF
SOURCE: ABNORMAL
SP GR UR STRIP: >1.03 (ref 1–1.03)
UROBILINOGEN UR STRIP-ACNC: 0.2 EU/DL (ref 0.2–1)
WBC #/AREA URNS AUTO: ABNORMAL /HPF

## 2018-06-25 PROCEDURE — 81001 URINALYSIS AUTO W/SCOPE: CPT | Performed by: FAMILY MEDICINE

## 2018-06-25 PROCEDURE — 80048 BASIC METABOLIC PNL TOTAL CA: CPT | Performed by: FAMILY MEDICINE

## 2018-06-25 PROCEDURE — 36415 COLL VENOUS BLD VENIPUNCTURE: CPT | Performed by: FAMILY MEDICINE

## 2018-06-25 PROCEDURE — 80061 LIPID PANEL: CPT | Performed by: FAMILY MEDICINE

## 2018-06-25 PROCEDURE — 83036 HEMOGLOBIN GLYCOSYLATED A1C: CPT | Performed by: FAMILY MEDICINE

## 2018-06-25 PROCEDURE — 99214 OFFICE O/P EST MOD 30 MIN: CPT | Performed by: FAMILY MEDICINE

## 2018-06-25 RX ORDER — MULTIPLE VITAMINS W/ MINERALS TAB 9MG-400MCG
1 TAB ORAL DAILY
Qty: 100 TABLET | Refills: 3 | Status: SHIPPED | OUTPATIENT
Start: 2018-06-25 | End: 2019-09-16

## 2018-06-25 NOTE — LETTER
June 29, 2018      Andrew Torres  [address] [facility] [address]        Dear Andrew,       The results of your recent lab tests were overall within normal limits. The urine test did not show any signs of infection. Enclosed is a copy of these results.  If you have any further questions or problems, please contact our office.       Sincerely,        Ishan Andrade MD    Results for orders placed or performed in visit on 06/25/18   Lipid panel reflex to direct LDL Fasting   Result Value Ref Range    Cholesterol 131 <200 mg/dL    Triglycerides 132 <150 mg/dL    HDL Cholesterol 36 (L) >39 mg/dL    LDL Cholesterol Calculated 69 <100 mg/dL    Non HDL Cholesterol 95 <130 mg/dL   Basic metabolic panel   Result Value Ref Range    Sodium 143 133 - 144 mmol/L    Potassium 4.5 3.4 - 5.3 mmol/L    Chloride 107 94 - 109 mmol/L    Carbon Dioxide 29 20 - 32 mmol/L    Anion Gap 7 3 - 14 mmol/L    Glucose 111 (H) 70 - 99 mg/dL    Urea Nitrogen 11 7 - 30 mg/dL    Creatinine 0.98 0.66 - 1.25 mg/dL    GFR Estimate 76 >60 mL/min/1.7m2    GFR Estimate If Black >90 >60 mL/min/1.7m2    Calcium 9.2 8.5 - 10.1 mg/dL   Hemoglobin A1c   Result Value Ref Range    Hemoglobin A1C 5.3 0 - 5.6 %   *UA reflex to Microscopic and Culture ([facility] (except [place] and [place])   Result Value Ref Range    Color Urine Yellow     Appearance Urine Clear     Glucose Urine Negative NEG^Negative mg/dL    Bilirubin Urine Small (A) NEG^Negative    Ketones Urine Negative NEG^Negative mg/dL    Specific Gravity Urine >1.030 1.003 - 1.035    Blood Urine Negative NEG^Negative    pH Urine 5.5 5.0 - 7.0 pH    Protein Albumin Urine 30 (A) NEG^Negative mg/dL    Urobilinogen Urine 0.2 0.2 - 1.0 EU/dL    Nitrite Urine Negative NEG^Negative    Leukocyte Esterase Urine Negative NEG^Negative    Source Midstream Urine    Urine Microscopic   Result Value Ref Range    WBC Urine 0 - 5 OTO5^0 - 5 /HPF    RBC Urine O - 2 OTO2^O - 2 /HPF     Bacteria Urine Few (A) NEG^Negative /HPF    Mucous Urine Present (A) NEG^Negative /LPF

## 2018-06-25 NOTE — PROGRESS NOTES
SUBJECTIVE:   Andrew Torres is a 68 year old male who presents to clinic today for the following health issues:      Patient is following up from his stroke - patient states that his left wrist has been achy, his hand is better though.    Overall improving. More independent. No involved with PT and not doing much at home. Walking but wife thinks he should walk and do home PT more    Notes frequency of urination. No pain.no incontinence    Reviewed immunizations and he declined    Diabetes stable, diet controlled. Will recheck labs    Lab Results   Component Value Date    A1C 5.3 06/25/2018    A1C 5.7 03/21/2018    A1C 5.8 11/15/2017    A1C 6.0 07/27/2017    A1C 5.4 11/09/2016         Problem list and histories reviewed & adjusted, as indicated.  Additional history: as documented    Recent Labs   Lab Test  06/25/18   1321  04/30/18   1540  03/21/18   1401  11/15/17   1112  11/15/17   1111  10/23/17   11/17/15   0836 09/29/15   07/07/15   1230  08/18/14   1042  10/22/12   A1C  5.3   --   5.7   --   5.8   --    --    < >   --    --    < >  5.6  5.4   < >  5.5   LDL   --    --    --   151*   --    --    --    --    --   63   --    --   47   < >  73   HDL   --    --    --   48   --    --    --    --    --   38   --    --   52   < >  48   TRIG   --    --    --   119   --    --    --    --    --   90   --    --   153*   < >  74   ALT   --    --    --    --    --    --   32   --   17   --    --    --    --    --   19   CR   --   0.94  1.24  0.90   --    < >  0.8   < >  0.78   --    < >  0.90  1.00   < >  1.3   GFRESTIMATED   --   80  58*  83   --    --    --    < >  >90  Non  GFR Calc     --    --   84  75   < >   --    GFRESTBLACK   --   >90  70  >90   --    --    --    < >  >90   GFR Calc     --    --   >90   GFR Calc    >90   GFR Calc     < >   --    POTASSIUM   --   4.2  4.6   --    --    < >  3.7   < >  4.2   --    < >  3.7  3.9   < >  4.3   TSH   --   "  --    --   0.97   --    --    --    --    --    --    --   0.60   --    < >   --     < > = values in this interval not displayed.      BP Readings from Last 3 Encounters:   06/25/18 138/64   04/30/18 128/62   03/21/18 139/70    Wt Readings from Last 3 Encounters:   06/25/18 212 lb (96.2 kg)   04/30/18 218 lb 6.4 oz (99.1 kg)   03/21/18 224 lb (101.6 kg)                    Reviewed and updated as needed this visit by clinical staff  Tobacco  Allergies  Meds  Med Hx  Surg Hx  Fam Hx  Soc Hx      Reviewed and updated as needed this visit by Provider         ROS:  CONSTITUTIONAL: NEGATIVE for fever, chills, change in weight  ENT/MOUTH: NEGATIVE for ear, mouth and throat problems  RESP: NEGATIVE for significant cough or SOB  CV: NEGATIVE for chest pain, palpitations or peripheral edema  : positive for and frequency  MUSCULOSKELETAL: left side weakness has improved but still present  NEURO: hx of CVA with left side weakness  HEME/ALLERGY/IMMUNE: NEGATIVE for bleeding problems  PSYCHIATRIC: POSITIVE forfatigue and stress due to stroke. frustrated and NEGATIVE foralcohol abuse, concentration difficulty, feelings of worthlessness/guilt, suicidal thoughts with specific plan, suicidal thoughts with out a plan and thoughts of hurting someone else    OBJECTIVE:     /64 (Cuff Size: Adult Large)  Pulse 56  Temp 98  F (36.7  C) (Oral)  Ht 5' 8.75\" (1.746 m)  Wt 212 lb (96.2 kg)  BMI 31.54 kg/m2  Body mass index is 31.54 kg/(m^2).   GENERAL: alert and no distress  NECK: no adenopathy, no asymmetry, masses, or scars and thyroid normal to palpation  RESP: lungs clear to auscultation - no rales, rhonchi or wheezes  CV: regular rate and rhythm, normal S1 S2, no S3 or S4, no murmur, click or rub, no peripheral edema and peripheral pulses strong  ABDOMEN: soft, nontender, no hepatosplenomegaly, no masses and bowel sounds normal  MS: weakness of  left hand 4/4 strength. Ambulating independently  NEURO: weakness of " left hand and arm. Left leg and mentation intact  PSYCH: mentation appears normal and fatigued    Diagnostic Test Results:  none     ASSESSMENT:       PLAN:   (E11.59) Type 2 diabetes mellitus with other circulatory complication, without long-term current use of insulin (H)  (primary encounter diagnosis)  Comment: stable  Plan: PAF COMPLETED, Lipid panel reflex to direct LDL        Fasting, Basic metabolic panel, Hemoglobin A1c,        multivitamin, therapeutic with minerals         (MULTI-VITAMIN) TABS tablet, Urine Microscopic            (Z86.73) History of CVA (cerebrovascular accident)  Comment: improved  Plan: multivitamin, therapeutic with minerals         (MULTI-VITAMIN) TABS tablet        Encouraged walking and daily home PT exercises    (G81.94) Left hemiparesis (H)  Comment: improved  Plan: multivitamin, therapeutic with minerals         (MULTI-VITAMIN) TABS tablet        See above    (Z13.5) Screening for diabetic retinopathy  Comment:   Plan: he has specialist he sees    (Z23) Need for prophylactic vaccination against Streptococcus pneumoniae (pneumococcus)  Comment:   Plan: delcined. Reviewed risks    (E78.5) Hyperlipidemia LDL goal <70  Comment: tolerating statin. Had trouble with simvastatin  Plan: Lipid panel reflex to direct LDL Fasting            (R63.4) Loss of weight  Comment: poor calorie intake  Plan: multivitamin, therapeutic with minerals         (MULTI-VITAMIN) TABS tablet            (R30.0) Dysuria  Comment: frequency, no intontinance  Plan: *UA reflex to Microscopic and Culture ([facility] (except [place] and         [place])        Recent PSA normal.       Declined immunizations: Pneumococcal and Zoster. Risks and benefits reviewed. Follow up if he changes his mind.     Patient Instructions     Orders Placed This Encounter         Lipid panel reflex to direct LDL Fasting     Last Lab Result: LDL Cholesterol Calculated (mg/dL)       Date                      Value                 11/15/2017               151 (H)          ----------     Order Specific Question:   Perform labs while fasting or non-fasting?     Answer:   Fasting     Basic metabolic panel     Hemoglobin A1c     Last Lab Result: Hemoglobin A1C (%)       Date                     Value                 03/21/2018               5.7              ----------     *UA reflex to Microscopic and Culture ([place] [facility] (except [place] and [place])     multivitamin, therapeutic with minerals (MULTI-VITAMIN) TABS tablet     Sig: Take 1 tablet by mouth daily     Dispense:  100 tablet     Refill:  3     Dr Andrade will notify your of the results    Follow up in 3-6 months for recheck.    [facility]   Discharged by : Shruthi SMART CMA ([facility])    Paper scripts provided to patient : none   If you have any questions regarding to your visit please contact your care team:     Team Silver              Clinic Hours Telephone Number     Dr. Diego Honeycutt PA-C   7am-7pm  Monday - Thursday   7am-5pm  Fridays  [phone]   (Appointment scheduling available 24/7)     RN Line  [phone] option 2     [facility] [place] [facility] - 11am-9pm Monday-Friday Saturday-Sunday- 9am-5pm     [place] -   5pm-9pm Monday-Friday Saturday-Sunday- 9am-5pm    [phone] - [place]    [phone] - [place]     For a Price Quote for your services, please call our Consumer Price Line at [phone].     What options do I have for visits at the clinic other than the traditional office visit?     To expand how we care for you, many of our providers are utilizing electronic visits (e-visits) and telephone visits, when medically appropriate, for interactions with their patients rather than a visit in the clinic. We also offer nurse visits for many medical concerns. Just like any other service, we will bill your insurance company for  this type of visit based on time spent on the phone with your provider. Not all insurance companies cover these visits. Please check with your medical insurance if this type of visit is covered. You will be responsible for any charges that are not paid by your insurance.   E-visits via ID Quantiquehart: generally incur a $35.00 fee.     Telephone visits:   Time spent on the phone: *charged based on time that is spent on the phone in increments of 10 minutes. Estimated cost:   5-10 mins $30.00   11-20 mins. $59.00   21-30 mins. $85.00     Use Megathread (secure email communication and access to your chart) to send your primary care provider a message or make an appointment. Ask someone on your Team how to sign up for Megathread.     As always, Thank you for trusting us with your health care needs!      [facility]:    Scheduling Appointments  [facility]  Call: [phone]    [facility]  Call: [phone]    [facility]  Call: [phone]    For Gastroenterology referrals   [facility] Gastroenterology   Clinics and Surgery Center, 4th Floor   [address] [place] [address]   Appointments: [phone]    WHERE TO GO FOR CARE?  Clinic    Make an appointment if you:       Are sick (cold, cough, flu, sore throat, earache or in pain).       Have a small injury (sprain, small cut, burn or broken bone).       Need a physical exam, Pap smear, vaccine or prescription refill.       Have questions about your health or medicines.    To reach us:      Call [phone] ([phone]). Open 24 hours every day. (For counseling services, call [phone].)    Log into Megathread at [email or web address]. (Visit [email or web address] to create an account.) Hospital emergency room    An emergency is a serious or life- threatening problem that must be treated right away.    Call [phone] or get to the hospital if you have:      Very bad or sudden:             - Chest pain or pressure         - Bleeding         - Head or belly pain         - Dizziness or trouble seeing, walking or                          Speaking      Problems breathing      Blood in your vomit or you are coughing up blood      A major injury (knocked out, loss of a finger or limb, rape, broken bone protruding from skin)    A mental health crisis. (Or call the Mental Health Crisis line at 1-331.637.6318 or Suicide Prevention Hotline at 1-195.986.1068.)    Open 24 hours every day. You don't need an appointment.     Urgent care    Visit urgent care for sickness or small injuries when the clinic is closed. You don't need an appointment. To check hours or find an urgent care near you, visit [email or web address]. Online care    Get online care from [facility] for more than 70 common problems, like colds, allergies and infections. Open 24 hours every day at:   [email or web address]   Need help deciding?    For advice about where to be seen, you may call your clinic and ask to speak with a nurse. We're here for you 24 hours every day.         If you are deaf or hard of hearing, please let us know. We provide many free services including sign language interpreters, oral interpreters, TTYs, telephone amplifiers, note takers and written materials.           Ishan Andrade MD, MD  [facility]

## 2018-06-25 NOTE — MR AVS SNAPSHOT
After Visit Summary   6/25/2018    Andrew Torres    MRN: [id number]           Patient Information     Date Of Birth          1950        Visit Information        Provider Department      6/25/2018 12:20 PM Ishan Andrade MD [facility]        Today's Diagnoses     Type 2 diabetes mellitus with other circulatory complication, without long-term current use of insulin (H)    -  1    History of CVA (cerebrovascular accident)        Left hemiparesis (H)        Screening for diabetic retinopathy        Need for prophylactic vaccination against Streptococcus pneumoniae (pneumococcus)        Hyperlipidemia LDL goal <70        Loss of weight        Dysuria          Care Instructions    Orders Placed This Encounter         Lipid panel reflex to direct LDL Fasting     Last Lab Result: LDL Cholesterol Calculated (mg/dL)       Date                     Value                 11/15/2017               151 (H)          ----------     Order Specific Question:   Perform labs while fasting or non-fasting?     Answer:   Fasting     Basic metabolic panel     Hemoglobin A1c     Last Lab Result: Hemoglobin A1C (%)       Date                     Value                 03/21/2018               5.7              ----------     *UA reflex to Microscopic and Culture ([facility] (except [place] and [place])     multivitamin, therapeutic with minerals (MULTI-VITAMIN) TABS tablet     Sig: Take 1 tablet by mouth daily     Dispense:  100 tablet     Refill:  3     Dr Andrade will notify your of the results    Follow up in 3-6 months for recheck.    [facility]   Discharged by : Shruthi SMART CMA (AAMA)    Paper scripts provided to patient : none   If you have any questions regarding to your visit please contact your care team:     Team Silver              Clinic Hours Telephone Number     Dr. Diego Honeycutt PA-C   7am-7pm   Monday - Thursday   7am-5pm  Fridays  [phone]   (Appointment scheduling available 24/7)     RN Line  [phone] option 2     Urgent Care - [place] and [place] - 11am-9pm Monday-Friday Saturday-Sunday- 9am-5pm     [place] -   5pm-9pm Monday-Friday Saturday-Sunday- 9am-5pm    [phone] - [place]    [phone] - [place]     For a Price Quote for your services, please call our Consumer Price Line at [phone].     What options do I have for visits at the clinic other than the traditional office visit?     To expand how we care for you, many of our providers are utilizing electronic visits (e-visits) and telephone visits, when medically appropriate, for interactions with their patients rather than a visit in the clinic. We also offer nurse visits for many medical concerns. Just like any other service, we will bill your insurance company for this type of visit based on time spent on the phone with your provider. Not all insurance companies cover these visits. Please check with your medical insurance if this type of visit is covered. You will be responsible for any charges that are not paid by your insurance.   E-visits via VuCOMP: generally incur a $35.00 fee.     Telephone visits:   Time spent on the phone: *charged based on time that is spent on the phone in increments of 10 minutes. Estimated cost:   5-10 mins $30.00   11-20 mins. $59.00   21-30 mins. $85.00     Use VuCOMP (secure email communication and access to your chart) to send your primary care provider a message or make an appointment. Ask someone on your Team how to sign up for VuCOMP.     As always, Thank you for trusting us with your health care needs!      [facility]:    Scheduling Appointments  [facility] [place]  Call: [phone]    [facility]  Call: [phone]    [facility]  Call: [phone]    For  Gastroenterology referrals   [facility] Gastroenterology   Clinics and Surgery Center, 4th Floor   [address] [place] [address]   Appointments: [phone]    WHERE TO GO FOR CARE?  Clinic    Make an appointment if you:       Are sick (cold, cough, flu, sore throat, earache or in pain).       Have a small injury (sprain, small cut, burn or broken bone).       Need a physical exam, Pap smear, vaccine or prescription refill.       Have questions about your health or medicines.    To reach us:      Call [phone] ([phone]). Open 24 hours every day. (For counseling services, call [phone].)    Log into Sharecare at [place]. (Visit Citymapper Limited to create an account.) Hospital emergency room    An emergency is a serious or life- threatening problem that must be treated right away.    Call 561 or get to the hospital if you have:      Very bad or sudden:            - Chest pain or pressure         - Bleeding         - Head or belly pain         - Dizziness or trouble seeing, walking or                          Speaking      Problems breathing      Blood in your vomit or you are coughing up blood      A major injury (knocked out, loss of a finger or limb, rape, broken bone protruding from skin)    A mental health crisis. (Or call the Mental Health Crisis line at 1-164.931.9395 or Suicide Prevention Hotline at 1-793.512.7829.)    Open 24 hours every day. You don't need an appointment.     Urgent care    Visit urgent care for sickness or small injuries when the clinic is closed. You don't need an appointment. To check hours or find an urgent care near you, visit [email or web address]. Online care    Get online care from [facility] for more than 70 common problems, like colds, allergies and infections. Open 24 hours every day at:   [email or web address]   Need help deciding?    For advice about where to be seen, you may call your clinic and ask to speak with a nurse. We're here for you 24 hours  "every day.         If you are deaf or hard of hearing, please let us know. We provide many free services including sign language interpreters, oral interpreters, TTYs, telephone amplifiers, note takers and written materials.               Follow-ups after your visit        Who to contact     If you have questions or need follow up information about today's clinic visit or your schedule please contact [facility] directly at [phone].  Normal or non-critical lab and imaging results will be communicated to you by MyChart, letter or phone within 4 business days after the clinic has received the results. If you do not hear from us within 7 days, please contact the clinic through Healthagenhart or phone. If you have a critical or abnormal lab result, we will notify you by phone as soon as possible.  Submit refill requests through 1spire or call your pharmacy and they will forward the refill request to us. Please allow 3 business days for your refill to be completed.          Additional Information About Your Visit        Care EveryWhere ID     This is your Care EveryWhere ID. This could be used by other organizations to access your [place] medical records  [id number]        Your Vitals Were     Pulse Temperature Height BMI (Body Mass Index)          56 98  F (36.7  C) (Oral) 5' 8.75\" (1.746 m) 31.54 kg/m2         Blood Pressure from Last 3 Encounters:   06/25/18 138/64   04/30/18 128/62   03/21/18 139/70    Weight from Last 3 Encounters:   06/25/18 212 lb (96.2 kg)   04/30/18 218 lb 6.4 oz (99.1 kg)   03/21/18 224 lb (101.6 kg)              We Performed the Following     *UA reflex to Microscopic and Culture ([place] and [facility] (except [place] and [place])     Basic metabolic panel     Hemoglobin A1c     Lipid panel reflex to direct LDL Fasting     PAF COMPLETED          Today's Medication Changes          These changes are accurate as of 6/25/18  1:12 PM.  If you have any questions, ask " your nurse or doctor.               Start taking these medicines.        Dose/Directions    multivitamin, therapeutic with minerals Tabs tablet   Used for:  Left hemiparesis (H), History of CVA (cerebrovascular accident), Type 2 diabetes mellitus with other circulatory complication, without long-term current use of insulin (H), Loss of weight   Started by:  Ishan Andrade MD        Dose:  1 tablet   Take 1 tablet by mouth daily   Quantity:  100 tablet   Refills:  3            Where to get your medicines      These medications were sent to [facility] [address] [place] [address] [place] - [address] [place] [address], [address] [place] [address]     Phone:  [phone]     multivitamin, therapeutic with minerals Tabs tablet                Primary Care Provider Office Phone # Fax #    Ishan Andrade [phone]       [facility] [address]        Equal Access to Services     [facility] : Hadii aad ku hadasho Soomaali, waaxda luqadaha, qaybta kaalmada adeegyada, waxay idiin hayaan kneneth lucas . [facility] [phone].    ATENCIÓN: Si habla español, tiene a huber disposición servicios gratuitos de asistencia lingüística. [facility] [phone].    We comply with applicable federal civil rights laws and [place] laws. We do not discriminate on the basis of race, color, national origin, age, disability, sex, sexual orientation, or gender identity.            Thank you!     Thank you for choosing [facility]  for your care. Our goal is always to provide you with excellent care. Hearing back from our patients is one way we can continue to improve our services. Please take a few minutes to complete the written survey that you may receive in the mail after your visit with us. Thank you!             Your Updated Medication List - Protect others around you: Learn how to safely use, store and throw away your medicines at www.disposemymeds.org.           This list is accurate as of 6/25/18  1:12 PM.  Always use your most recent med list.                   Brand Name Dispense Instructions for use Diagnosis    albuterol 108 (90 Base) MCG/ACT Inhaler    PROAIR HFA/PROVENTIL HFA/VENTOLIN HFA    1 Inhaler    Inhale 2 puffs into the lungs every 6 hours as needed for shortness of breath / dyspnea or wheezing    Cough       amLODIPine 10 MG tablet    NORVASC    90 tablet    Take 1 tablet (10 mg) by mouth daily    Essential hypertension with goal blood pressure less than 140/90, Coronary artery disease involving coronary bypass graft of native heart without angina pectoris, History of CVA (cerebrovascular accident)       ASPIRIN 81 PO           atorvastatin 10 MG tablet    LIPITOR    90 tablet    Take 1 tablet (10 mg) by mouth daily    Essential hypertension with goal blood pressure less than 140/90, Coronary artery disease involving coronary bypass graft of native heart without angina pectoris, Hyperlipidemia LDL goal <70       B-12 1000 MCG Caps      Take  by mouth.        carvedilol 25 MG tablet    COREG    180 tablet    Take 1 tablet (25 mg) by mouth 2 times daily (with meals)    Essential hypertension with goal blood pressure less than 140/90, Coronary artery disease involving coronary bypass graft of native heart without angina pectoris, History of CVA (cerebrovascular accident)       clopidogrel 75 MG tablet    PLAVIX    90 tablet    Take 1 tablet (75 mg) by mouth daily    Coronary artery disease involving coronary bypass graft of native heart without angina pectoris, History of CVA (cerebrovascular accident)       lisinopril 10 MG tablet    PRINIVIL/ZESTRIL    180 tablet    Take 1 tablet (10 mg) by mouth 2 times daily    Essential hypertension with goal blood pressure less than 140/90, Coronary artery disease involving coronary bypass graft of native heart without angina pectoris       multivitamin, therapeutic with minerals Tabs tablet     100 tablet    Take 1  tablet by mouth daily    Left hemiparesis (H), History of CVA (cerebrovascular accident), Type 2 diabetes mellitus with other circulatory complication, without long-term current use of insulin (H), Loss of weight       nitroGLYcerin 0.4 MG sublingual tablet    NITROSTAT    2 tablet    1 tablet now    Chest pain, CAD (coronary artery disease)       omega-3 fatty acids 1200 MG capsule      Take 1 capsule by mouth daily.        order for DME     1 Device    Equipment being ordered: wrist brace with thumb left    Thumb pain, left       order for DME     60 Can    Equipment being ordered: Ensure supplement    Weight loss       pantoprazole 40 MG EC tablet    PROTONIX    90 tablet    Take 1 tablet (40 mg) by mouth daily Take 30-60 minutes before a meal.    Essential hypertension with goal blood pressure less than 140/90, Coronary artery disease involving coronary bypass graft of native heart without angina pectoris

## 2018-06-25 NOTE — PATIENT INSTRUCTIONS
Orders Placed This Encounter         Lipid panel reflex to direct LDL Fasting     Last Lab Result: LDL Cholesterol Calculated (mg/dL)       Date                     Value                 11/15/2017               151 (H)          ----------     Order Specific Question:   Perform labs while fasting or non-fasting?     Answer:   Fasting     Basic metabolic panel     Hemoglobin A1c     Last Lab Result: Hemoglobin A1C (%)       Date                     Value                 03/21/2018               5.7              ----------     *UA reflex to Microscopic and Culture ([place] and [facility] (except [place] and [place])     multivitamin, therapeutic with minerals (MULTI-VITAMIN) TABS tablet     Sig: Take 1 tablet by mouth daily     Dispense:  100 tablet     Refill:  3     Dr Andrade will notify your of the results    Follow up in 3-6 months for recheck.    [facility]   Discharged by : Shruthi SMART CMA ([facility])    Paper scripts provided to patient : none   If you have any questions regarding to your visit please contact your care team:     Team Silver              Clinic Hours Telephone Number     Dr. Diego Honeycutt PA-C   7am-7pm  Monday - Thursday   7am-5pm  Fridays  [phone]   (Appointment scheduling available 24/7)     RN Line  [phone] option 2     Urgent Care - [place] and [facility] - 11am-9pm Monday-Friday Saturday-Sunday- 9am-5pm     [place] -   5pm-9pm Monday-Friday Saturday-Sunday- 9am-5pm    [phone] - [place]    [phone] - [place]     For a Price Quote for your services, please call our Consumer Price Line at [phone].     What options do I have for visits at the clinic other than the traditional office visit?     To expand how we care for you, many of our providers are utilizing electronic visits (e-visits) and telephone visits, when medically appropriate, for interactions with their  patients rather than a visit in the clinic. We also offer nurse visits for many medical concerns. Just like any other service, we will bill your insurance company for this type of visit based on time spent on the phone with your provider. Not all insurance companies cover these visits. Please check with your medical insurance if this type of visit is covered. You will be responsible for any charges that are not paid by your insurance.   E-visits via [email or web address]: generally incur a $35.00 fee.     Telephone visits:   Time spent on the phone: *charged based on time that is spent on the phone in increments of 10 minutes. Estimated cost:   5-10 mins $30.00   11-20 mins. $59.00   21-30 mins. $85.00     Use [employer] (secure email communication and access to your chart) to send your primary care provider a message or make an appointment. Ask someone on your Team how to sign up for [employer].     As always, Thank you for trusting us with your health care needs!      [facility]:    Scheduling Appointments  [place] [facility] [place]  Call: [phone]    [facility]  Call: [phone]    [facility]  Call: [phone]    For Gastroenterology referrals   [facility]   Clinics and Surgery Center, 4th Floor   [address] [place] [address]   Appointments: [phone]    WHERE TO GO FOR CARE?  Clinic    Make an appointment if you:       Are sick (cold, cough, flu, sore throat, earache or in pain).       Have a small injury (sprain, small cut, burn or broken bone).       Need a physical exam, Pap smear, vaccine or prescription refill.       Have questions about your health or medicines.    To reach us:      Call [phone] ([phone]). Open 24 hours every day. (For counseling services, call [phone].)    Log into Cosm[employer] at [email or web address]. (Visit [email or web address] to create an account.) Hospital emergency  room    An emergency is a serious or life- threatening problem that must be treated right away.    Call 911 or get to the hospital if you have:      Very bad or sudden:            - Chest pain or pressure         - Bleeding         - Head or belly pain         - Dizziness or trouble seeing, walking or                          Speaking      Problems breathing      Blood in your vomit or you are coughing up blood      A major injury (knocked out, loss of a finger or limb, rape, broken bone protruding from skin)    A mental health crisis. (Or call the Mental Health Crisis line at 1-745.482.7257 or Suicide Prevention Hotline at 1-364.834.4355.)    Open 24 hours every day. You don't need an appointment.     Urgent care    Visit urgent care for sickness or small injuries when the clinic is closed. You don't need an appointment. To check hours or find an urgent care near you, visit [email or web address]. Online care    Get online care from [facility] for more than 70 common problems, like colds, allergies and infections. Open 24 hours every day at:   [email or web address]   Need help deciding?    For advice about where to be seen, you may call your clinic and ask to speak with a nurse. We're here for you 24 hours every day.         If you are deaf or hard of hearing, please let us know. We provide many free services including sign language interpreters, oral interpreters, TTYs, telephone amplifiers, note takers and written materials.

## 2018-06-26 LAB
ANION GAP SERPL CALCULATED.3IONS-SCNC: 7 MMOL/L (ref 3–14)
BUN SERPL-MCNC: 11 MG/DL (ref 7–30)
CALCIUM SERPL-MCNC: 9.2 MG/DL (ref 8.5–10.1)
CHLORIDE SERPL-SCNC: 107 MMOL/L (ref 94–109)
CHOLEST SERPL-MCNC: 131 MG/DL
CO2 SERPL-SCNC: 29 MMOL/L (ref 20–32)
CREAT SERPL-MCNC: 0.98 MG/DL (ref 0.66–1.25)
GFR SERPL CREATININE-BSD FRML MDRD: 76 ML/MIN/1.7M2
GLUCOSE SERPL-MCNC: 111 MG/DL (ref 70–99)
HDLC SERPL-MCNC: 36 MG/DL
LDLC SERPL CALC-MCNC: 69 MG/DL
NONHDLC SERPL-MCNC: 95 MG/DL
POTASSIUM SERPL-SCNC: 4.5 MMOL/L (ref 3.4–5.3)
SODIUM SERPL-SCNC: 143 MMOL/L (ref 133–144)
TRIGL SERPL-MCNC: 132 MG/DL

## 2018-08-01 ENCOUNTER — TELEPHONE (OUTPATIENT)
Dept: FAMILY MEDICINE | Facility: CLINIC | Age: 68
End: 2018-08-01

## 2018-08-01 NOTE — TELEPHONE ENCOUNTER
Reason for Call:  Other call back    Detailed comments: [facility] for the pt updated medication list fax to Leilani Gregg at [phone]    Phone Number Patient can be reached at: Other phone number: [phone]    Best Time: anytime    Can we leave a detailed message on this number? YES    Call taken on 8/1/2018 at 1:43 PM by Rebecca Franco       Diminished

## 2018-08-01 NOTE — TELEPHONE ENCOUNTER
Called [facility] and left a VM message explaining that I am not sure if I can fax an updated Med list without permission from patient.  Asked for her to call me back.    Nasima Fu

## 2018-09-12 ENCOUNTER — TRANSFERRED RECORDS (OUTPATIENT)
Dept: HEALTH INFORMATION MANAGEMENT | Facility: CLINIC | Age: 68
End: 2018-09-12

## 2018-09-19 ENCOUNTER — OFFICE VISIT (OUTPATIENT)
Dept: FAMILY MEDICINE | Facility: CLINIC | Age: 68
End: 2018-09-19
Payer: COMMERCIAL

## 2018-09-19 VITALS
RESPIRATION RATE: 15 BRPM | DIASTOLIC BLOOD PRESSURE: 80 MMHG | WEIGHT: 209.4 LBS | OXYGEN SATURATION: 98 % | SYSTOLIC BLOOD PRESSURE: 136 MMHG | BODY MASS INDEX: 31.01 KG/M2 | TEMPERATURE: 98.2 F | HEIGHT: 69 IN | HEART RATE: 51 BPM

## 2018-09-19 DIAGNOSIS — Z09 HOSPITAL DISCHARGE FOLLOW-UP: Primary | ICD-10-CM

## 2018-09-19 PROCEDURE — 99213 OFFICE O/P EST LOW 20 MIN: CPT | Performed by: NURSE PRACTITIONER

## 2018-09-19 ASSESSMENT — PAIN SCALES - GENERAL: PAINLEVEL: NO PAIN (0)

## 2018-09-19 NOTE — PATIENT INSTRUCTIONS
Activities as tolerated    Keep exercising your shoulder.       At your visit today, we discussed your risk for falls and preventive options.    Fall Prevention  Falls often occur due to slipping, tripping or losing your balance. Millions of people fall every year and injure themselves. Here are ways to reduce your risk of falling again.    Think about your fall, was there anything that caused your fall that can be fixed, removed, or replaced?    Make your home safe by keeping walkways clear of objects you may trip over.    Use non-slip pads under rugs. Do not use area rugs or small throw rugs.    Use non-slip mats in bathtubs and showers.    Install handrails and lights on staircases.    Do not walk in poorly lit areas.    Do not stand on chairs or wobbly ladders.    Use caution when reaching overhead or looking upward. This position can cause a loss of balance.    Be sure your shoes fit properly, have non-slip bottoms and are in good condition.     Wear shoes both inside and out. Avoid going barefoot or wearing slippers.    Be cautious when going up and down stairs, curbs, and when walking on uneven sidewalks.    If your balance is poor, consider using a cane or walker.    If your fall was related to alcohol use, stop or limit alcohol intake.     If your fall was related to use of sleeping medicines, talk to your doctor about this. You may need to reduce your dosage at bedtime if you awaken during the night to go to the bathroom.      To reduce the need for nighttime bathroom trips:  ? Avoid drinking fluids for several hours before going to bed  ? Empty your bladder before going to bed  ? Men can keep a urinal at the bedside    Stay as active as you can. Balance, flexibility, strength, and endurance all come from exercise. They all play a role in preventing falls. Ask your healthcare provider which types of activity are right for you.    Get your vision checked on a regular basis.    If you have pets, know where  they are before you stand up or walk so you don't trip over them.    Use night lights.  Date Last Reviewed: 11/5/2015 2000-2017 [employer] [address], [place] [address]. All rights reserved. This information is not intended as a substitute for professional medical care. Always follow your healthcare professional's instructions.      [facility]   Discharged by : Dorothy Ramires CMA    If you have any questions regarding your visit please contact your care team:     Team Gold                Clinic Hours Telephone Number     Dr. Becky Lloyd, CNP  Meka Mayorga, CNP 7am-7pm  Monday - Thursday   7am-5pm  Fridays  [phone]   (Appointment scheduling available 24/7)     RN Line  [phone] option 2     [facility] [place] [facility] 11am-9pm Monday-Friday Saturday-Sunday- 9am-5pm     [place] -   5pm-9pm Monday-Friday Saturday-Sunday- 9am-5pm    [phone] - [place]    [phone] - [place]       For a Price Quote for your services, please call our Consumer Price Line at [phone].     What options do I have for visits at the clinic other than the traditional office visit?     To expand how we care for you, many of our providers are utilizing electronic visits (e-visits) and telephone visits, when medically appropriate, for interactions with their patients rather than a visit in the clinic. We also offer nurse visits for many medical concerns. Just like any other service, we will bill your insurance company for this type of visit based on time spent on the phone with your provider. Not all insurance companies cover these visits. Please check with your medical insurance if this type of visit is covered. You will be responsible for any charges that are not paid by your insurance.   E-visits via [employer]: generally incur a $35.00 fee.     Telephone visits:  Time  spent on the phone: *charged based on time that is spent on the phone in increments of 10 minutes. Estimated cost:   5-10 mins $30.00   11-20 mins. $59.00   21-30 mins. $85.00       Use [employer] (secure email communication and access to your chart) to send your primary care provider a message or make an appointment. Ask someone on your Team how to sign up for [employer] Sessions.     As always, Thank you for trusting us with your health care needs!      [facility]:    Scheduling Appointments  [place]  Call: [phone]    [place], [facility]  Call: [phone]    [facility]  Call: [phone]    For Gastroenterology referrals   [facility]   Clinics and Surgery Center, 4th Floor   [address] [place] [address]   Appointments: [phone]    WHERE TO GO FOR CARE?  Clinic    Make an appointment if you:       Are sick (cold, cough, flu, sore throat, earache or in pain).       Have a small injury (sprain, small cut, burn or broken bone).       Need a physical exam, Pap smear, vaccine or prescription refill.       Have questions about your health or medicines.    To reach us:      Call [phone] ([phone]). Open 24 hours every day. (For counseling services, call [phone].)    Log into Playground Sessions at [email or web address]. (Visit [email or web address] to create an account.) Hospital emergency room    An emergency is a serious or life- threatening problem that must be treated right away.    Call [phone] or get to the hospital if you have:      Very bad or sudden:            - Chest pain or pressure         - Bleeding         - Head or belly pain         - Dizziness or trouble seeing, walking or                          Speaking      Problems breathing      Blood in your vomit or you are coughing up blood      A major injury (knocked out, loss of a finger or limb, rape, broken bone protruding from skin)    A  mental health crisis. (Or call the Mental Health Crisis line at 1-268.429.6274 or Suicide Prevention Hotline at 1-820.560.7105.)    Open 24 hours every day. You don't need an appointment.     Urgent care    Visit urgent care for sickness or small injuries when the clinic is closed. You don't need an appointment. To check hours or find an urgent care near you, visit [email or web address]. Online care    Get online care from [facility] for more than 70 common problems, like colds, allergies and infections. Open 24 hours every day at:   [email or web address]   Need help deciding?    For advice about where to be seen, you may call your clinic and ask to speak with a nurse. We're here for you 24 hours every day.         If you are deaf or hard of hearing, please let us know. We provide many free services including sign language interpreters, oral interpreters, TTYs, telephone amplifiers, note takers and written materials.

## 2018-09-19 NOTE — PROGRESS NOTES
SUBJECTIVE:   Andrew Torres is a 68 year old male who presents to clinic today for the following health issues:      ED/UC Followup:    Facility:  [facility].   Date of visit: 9/12/2018  Reason for visit: fall with injury to shoulder   Current Status: better. Able to move his shoulder, has resumed his normal activities.     ED report extracted   Andrew Torres is a 68 y.o. Male with history of CVA (left sided residual weakness) and CAD on Plavix who presents to the emergency department with his wife for evaluation of fall and right posterior shoulder pain. The patient states that he was walking up his wooden patio stairs carrying something when he lost his balance and fell backwards, sliding on his back down about 10 steps around 1930 tonight. The patient complains of right shoulder and tailbone pain that he currently rates 5/10 in severity. He denies hitting his head or having LOC. The patient denies any dizziness, chest pain, shortness of breath, abdominal pain, headache, nausea, neck pain, hip pain, numbness or tingling. No further complaints or concerns are voiced at this time.    Impression and Plan:  Andrew Torres is a 68 y.o. Male with history significant for CVA on Plavix here for evaluation after falling down steps as noted above. Exam remarkable for right posterior shoulder abrasion. CT head and neck were obtained which were negative for any acute intracranial pathology or cervical fracture. Radiographs of the shoulder and pelvis were unremarkable for fracture, dislocation/subluxation. The rest of the patient's head to toe trauma exam was unremarkable. He denies any prodromal symptoms prior to his fall. No further imaging or lab testing indicated. With reasonable clinical certainty I feel the patient is safe for discharge home with close follow-up with primary care provider. He was in agreement with this and will return to the emergency department a new worsening symptoms discussed at  bedside.    Lives with his wife and granddaughter.   Declines pneumovax and influenza vaccine today              Problem list and histories reviewed & adjusted, as indicated.  Additional history: as documented    Patient Active Problem List   Diagnosis     CAD (coronary artery disease) stent placed 2006- life long plavix recommended     Carotid stenosis stent placed right 2010     Advanced directives, counseling/discussion     Type 2 diabetes mellitus with other circulatory complications     Coronary artery disease involving coronary bypass graft of native heart without angina pectoris     Gastroesophageal reflux disease without esophagitis     Essential hypertension with goal blood pressure less than 140/90     Hyperlipidemia LDL goal <70     Uric acid kidney stone     History of CVA (cerebrovascular accident)     Left hemiparesis (H)     No past surgical history on file.    Social History   Substance Use Topics     Smoking status: Never Smoker     Smokeless tobacco: Never Used     Alcohol use Yes      Comment: 2-3 drinks per week     Family History   Problem Relation Age of Onset     Hypertension Mother      Hypertension Father      HEART DISEASE Paternal Grandmother      Cancer Sister          Current Outpatient Prescriptions   Medication Sig Dispense Refill     albuterol (PROAIR HFA/PROVENTIL HFA/VENTOLIN HFA) 108 (90 BASE) MCG/ACT Inhaler Inhale 2 puffs into the lungs every 6 hours as needed for shortness of breath / dyspnea or wheezing 1 Inhaler 0     amLODIPine (NORVASC) 10 MG tablet Take 1 tablet (10 mg) by mouth daily 90 tablet 3     ASPIRIN 81 PO        atorvastatin (LIPITOR) 10 MG tablet Take 1 tablet (10 mg) by mouth daily 90 tablet 1     carvedilol (COREG) 25 MG tablet Take 1 tablet (25 mg) by mouth 2 times daily (with meals) 180 tablet 1     clopidogrel (PLAVIX) 75 MG tablet Take 1 tablet (75 mg) by mouth daily 90 tablet 3     Cyanocobalamin (B-12) 1000 MCG CAPS Take  by mouth.       lisinopril  "(PRINIVIL/ZESTRIL) 10 MG tablet Take 1 tablet (10 mg) by mouth 2 times daily 180 tablet 3     multivitamin, therapeutic with minerals (MULTI-VITAMIN) TABS tablet Take 1 tablet by mouth daily 100 tablet 3     nitroglycerin (NITROSTAT) 0.4 MG SL tablet 1 tablet now 2 tablet 0     omega-3 fatty acids (FISH OIL) 1200 MG capsule Take 1 capsule by mouth daily.       order for DME Equipment being ordered: Ensure supplement 60 Can 11     order for DME Equipment being ordered: wrist brace with thumb left 1 Device 0     pantoprazole (PROTONIX) 40 MG EC tablet Take 1 tablet (40 mg) by mouth daily Take 30-60 minutes before a meal. 90 tablet 1       Reviewed and updated as needed this visit by clinical staff  Allergies  Meds       Reviewed and updated as needed this visit by Provider         ROS:  Constitutional, HEENT, cardiovascular, pulmonary, GI, , musculoskeletal, neuro, skin, endocrine and psych systems are negative, except as otherwise noted.    OBJECTIVE:     /80 (BP Location: Right arm, Patient Position: Chair, Cuff Size: Adult Large)  Pulse 51  Temp 98.2  F (36.8  C) (Oral)  Resp 15  Ht 5' 8.75\" (1.746 m)  Wt 209 lb 6.4 oz (95 kg)  SpO2 98%  BMI 31.15 kg/m2  Body mass index is 31.15 kg/(m^2).  GENERAL APPEARANCE:elderly  healthy, alert and no distress  EYES: Eyes grossly normal to inspection, PERRL and conjunctivae and sclerae normal  RESP: lungs clear to auscultation - no rales, rhonchi or wheezes  CV: regular rates and rhythm, normal S1 S2, no S3 or S4 and no murmur, click or rub  MS: extremities normal- no gross deformities noted  ORTHO:   SHOULDER Exam-Right   Inspection: no swelling, no bruising, no discoloration, no obvious deformity, no asymmetry, no glenohumeral joint anterior bulge, no distal clavicle elevation, no muscle atrophy, no scapular winging   Tenderness of: SC joint- no, clavicle(prox-mid)- no, clavicle-(mid-distal)- no, AC joint- no, acromion- no, anterior capsule- no, prox bicep " tendon- no, greater tuberosity- no, prox humerus- no, supraspinatous- no, infraspinatous- no, superior trapezious- no, rhomboids- no   Range of Motion: Active- forward flexion- normal, abduction- normal, external rotation- normal, internal rotation- normal  Range of Motion: Passive- forward flexion- normal, abduction- normal, external rotation- normal, internal rotation- normal   Strength: forward flexion- 5/5   Special tests: none        PSYCH: mentation appears normal and affect normal/bright    Diagnostic Test Results:  none     ASSESSMENT/PLAN:     1. Hospital discharge follow-up  Sustained a fall downstairs and injured his right shoulder. ED discharge summary and imaging reviewed no acute injuries noted. Today his shoulder pain has improved. Discussed heat, tylenol, and continue activities as tolerated. If symptoms do not improve we can certainly refer pt for physical therapy.     TERE Kidd [facility]

## 2018-09-19 NOTE — MR AVS SNAPSHOT
After Visit Summary   9/19/2018    Andrew Torres    MRN: [id number]           Patient Information     Date Of Birth          1950        Visit Information        Provider Department      9/19/2018 1:40 PM Meka Mayorga APRN [facility]        Care Instructions      Activities as tolerated    Keep exercising your shoulder.       At your visit today, we discussed your risk for falls and preventive options.    Fall Prevention  Falls often occur due to slipping, tripping or losing your balance. Millions of people fall every year and injure themselves. Here are ways to reduce your risk of falling again.    Think about your fall, was there anything that caused your fall that can be fixed, removed, or replaced?    Make your home safe by keeping walkways clear of objects you may trip over.    Use non-slip pads under rugs. Do not use area rugs or small throw rugs.    Use non-slip mats in bathtubs and showers.    Install handrails and lights on staircases.    Do not walk in poorly lit areas.    Do not stand on chairs or wobbly ladders.    Use caution when reaching overhead or looking upward. This position can cause a loss of balance.    Be sure your shoes fit properly, have non-slip bottoms and are in good condition.     Wear shoes both inside and out. Avoid going barefoot or wearing slippers.    Be cautious when going up and down stairs, curbs, and when walking on uneven sidewalks.    If your balance is poor, consider using a cane or walker.    If your fall was related to alcohol use, stop or limit alcohol intake.     If your fall was related to use of sleeping medicines, talk to your doctor about this. You may need to reduce your dosage at bedtime if you awaken during the night to go to the bathroom.      To reduce the need for nighttime bathroom trips:  ? Avoid drinking fluids for several hours before going to bed  ? Empty your bladder before going to bed  ? Men can  keep a urinal at the bedside    Stay as active as you can. Balance, flexibility, strength, and endurance all come from exercise. They all play a role in preventing falls. Ask your healthcare provider which types of activity are right for you.    Get your vision checked on a regular basis.    If you have pets, know where they are before you stand up or walk so you don't trip over them.    Use night lights.  Date Last Reviewed: 11/5/2015 2000-2017 [employer]. [address] [place] [address]. All rights reserved. This information is not intended as a substitute for professional medical care. Always follow your healthcare professional's instructions.      [facility]   Discharged by : Dorothy Ramires CMA    If you have any questions regarding your visit please contact your care team:     Team Gold                Clinic Hours Telephone Number     Dr. Becky Lloyd, CNP  Meka Mayorga, CNP 7am-7pm  Monday - Thursday   7am-5pm  Fridays  [phone]   (Appointment scheduling available 24/7)     RN Line  [phone] option 2     Urgent Care - [place] - 11am-9pm Monday-Friday Saturday-Sunday- 9am-5pm     [place] -   5pm-9pm Monday-Friday Saturday-Sunday- 9am-5pm    [phone] - [place]    [phone] - [place]       For a Price Quote for your services, please call our Consumer Price Line at [phone].     What options do I have for visits at the clinic other than the traditional office visit?     To expand how we care for you, many of our providers are utilizing electronic visits (e-visits) and telephone visits, when medically appropriate, for interactions with their patients rather than a visit in the clinic. We also offer nurse visits for many medical concerns. Just like any other service, we will bill your insurance company for this type of visit based on time  spent on the phone with your provider. Not all insurance companies cover these visits. Please check with your medical insurance if this type of visit is covered. You will be responsible for any charges that are not paid by your insurance.   E-visits via [employer]: generally incur a $35.00 fee.     Telephone visits:  Time spent on the phone: *charged based on time that is spent on the phone in increments of 10 minutes. Estimated cost:   5-10 mins $30.00   11-20 mins. $59.00   21-30 mins. $85.00       Use [employer] (secure email communication and access to your chart) to send your primary care provider a message or make an appointment. Ask someone on your Team how to sign up for [employer].     As always, Thank you for trusting us with your health care needs!      [facility]:    Scheduling Appointments  [place] [place]  Call: [phone]    [facility]  Call: [phone]    [facility]  Call: [phone]    For Gastroenterology referrals   [facility]   Clinics and Surgery Center, 4th Floor   [address] [place] [address]   Appointments: [phone]    WHERE TO GO FOR CARE?  Clinic    Make an appointment if you:       Are sick (cold, cough, flu, sore throat, earache or in pain).       Have a small injury (sprain, small cut, burn or broken bone).       Need a physical exam, Pap smear, vaccine or prescription refill.       Have questions about your health or medicines.    To reach us:      Call [phone] ([phone]). Open 24 hours every day. (For counseling services, call [phone].)    Log into [employer] at [email or web address]. (Visit [email or web address] to create an account.) Hospital emergency room    An emergency is a serious or life- threatening problem that must be treated right away.    Call 361 or get to the hospital if you have:      Very bad or sudden:            - Chest pain or  pressure         - Bleeding         - Head or belly pain         - Dizziness or trouble seeing, walking or                          Speaking      Problems breathing      Blood in your vomit or you are coughing up blood      A major injury (knocked out, loss of a finger or limb, rape, broken bone protruding from skin)    A mental health crisis. (Or call the Mental Health Crisis line at 1-994.434.7328 or Suicide Prevention Hotline at 1-319.852.2181.)    Open 24 hours every day. You don't need an appointment.     Urgent care    Visit urgent care for sickness or small injuries when the clinic is closed. You don't need an appointment. To check hours or find an urgent care near you, visit [email or web address]. Online care    Get online care from [facility] for more than 70 common problems, like colds, allergies and infections. Open 24 hours every day at:   [email or web address]   Need help deciding?    For advice about where to be seen, you may call your clinic and ask to speak with a nurse. We're here for you 24 hours every day.         If you are deaf or hard of hearing, please let us know. We provide many free services including sign language interpreters, oral interpreters, TTYs, telephone amplifiers, note takers and written materials.                   Follow-ups after your visit        Your next 10 appointments already scheduled     Oct 29, 2018 12:20 PM CDT   Office Visit with Ishan Andrade MD   [facility] ([facility])    [address] [place] [address] [phone]           Bring a current list of meds and any records pertaining to this visit. For Physicals, please bring immunization records and any forms needing to be filled out. Please arrive 10 minutes early to complete paperwork.              Who to contact     If you have questions or need follow up information about today's clinic visit or your schedule please contact [facility] directly  "at [phone].  Normal or non-critical lab and imaging results will be communicated to you by MyChart, letter or phone within 4 business days after the clinic has received the results. If you do not hear from us within 7 days, please contact the clinic through MyChart or phone. If you have a critical or abnormal lab result, we will notify you by phone as soon as possible.  Submit refill requests through [employer] or call your pharmacy and they will forward the refill request to us. Please allow 3 business days for your refill to be completed.          Additional Information About Your Visit        Care EveryWhere ID     This is your Care EveryWhere ID. This could be used by other organizations to access your [place] medical records  [id number]        Your Vitals Were     Pulse Temperature Respirations Height Pulse Oximetry BMI (Body Mass Index)    51 98.2  F (36.8  C) (Oral) 15 5' 8.75\" (1.746 m) 98% 31.15 kg/m2       Blood Pressure from Last 3 Encounters:   09/19/18 136/80   06/25/18 138/64   04/30/18 128/62    Weight from Last 3 Encounters:   09/19/18 209 lb 6.4 oz (95 kg)   06/25/18 212 lb (96.2 kg)   04/30/18 218 lb 6.4 oz (99.1 kg)              Today, you had the following     No orders found for display       Primary Care Provider Office Phone # Fax #    Ishan Andrade [phone]       [facility] [address]        Equal Access to Services     ARELY VANCE AH: Hadii aad ku hadasho Soomaali, waaxda luqadaha, qaybta kaalmada adeegyada, rosamaria hickman. So [facility] [phone].    ATENCIÓN: Si torrila cesar, tiene a huber disposición servicios gratuitos de asistencia lingüística. Llame al [phone].    We comply with applicable federal civil rights laws and [place] laws. We do not discriminate on the basis of race, color, national origin, age, disability, sex, sexual orientation, or gender identity.            Thank you!     Thank you for " choosing [facility]  for your care. Our goal is always to provide you with excellent care. Hearing back from our patients is one way we can continue to improve our services. Please take a few minutes to complete the written survey that you may receive in the mail after your visit with us. Thank you!             Your Updated Medication List - Protect others around you: Learn how to safely use, store and throw away your medicines at www.disposemymeds.org.          This list is accurate as of 9/19/18  1:55 PM.  Always use your most recent med list.                   Brand Name Dispense Instructions for use Diagnosis    albuterol 108 (90 Base) MCG/ACT inhaler    PROAIR HFA/PROVENTIL HFA/VENTOLIN HFA    1 Inhaler    Inhale 2 puffs into the lungs every 6 hours as needed for shortness of breath / dyspnea or wheezing    Cough       amLODIPine 10 MG tablet    NORVASC    90 tablet    Take 1 tablet (10 mg) by mouth daily    Essential hypertension with goal blood pressure less than 140/90, Coronary artery disease involving coronary bypass graft of native heart without angina pectoris, History of CVA (cerebrovascular accident)       ASPIRIN 81 PO           atorvastatin 10 MG tablet    LIPITOR    90 tablet    Take 1 tablet (10 mg) by mouth daily    Essential hypertension with goal blood pressure less than 140/90, Coronary artery disease involving coronary bypass graft of native heart without angina pectoris, Hyperlipidemia LDL goal <70       B-12 1000 MCG Caps      Take  by mouth.        carvedilol 25 MG tablet    COREG    180 tablet    Take 1 tablet (25 mg) by mouth 2 times daily (with meals)    Essential hypertension with goal blood pressure less than 140/90, Coronary artery disease involving coronary bypass graft of native heart without angina pectoris, History of CVA (cerebrovascular accident)       clopidogrel 75 MG tablet    PLAVIX    90 tablet    Take 1 tablet (75 mg) by mouth daily    Coronary artery  disease involving coronary bypass graft of native heart without angina pectoris, History of CVA (cerebrovascular accident)       lisinopril 10 MG tablet    PRINIVIL/ZESTRIL    180 tablet    Take 1 tablet (10 mg) by mouth 2 times daily    Essential hypertension with goal blood pressure less than 140/90, Coronary artery disease involving coronary bypass graft of native heart without angina pectoris       multivitamin, therapeutic with minerals Tabs tablet     100 tablet    Take 1 tablet by mouth daily    Left hemiparesis (H), History of CVA (cerebrovascular accident), Type 2 diabetes mellitus with other circulatory complication, without long-term current use of insulin (H), Loss of weight       nitroGLYcerin 0.4 MG sublingual tablet    NITROSTAT    2 tablet    1 tablet now    Chest pain, CAD (coronary artery disease)       omega-3 fatty acids 1200 MG capsule      Take 1 capsule by mouth daily.        order for DME     1 Device    Equipment being ordered: wrist brace with thumb left    Thumb pain, left       order for DME     60 Can    Equipment being ordered: Ensure supplement    Weight loss       pantoprazole 40 MG EC tablet    PROTONIX    90 tablet    Take 1 tablet (40 mg) by mouth daily Take 30-60 minutes before a meal.    Essential hypertension with goal blood pressure less than 140/90, Coronary artery disease involving coronary bypass graft of native heart without angina pectoris

## 2018-10-08 ENCOUNTER — TELEPHONE (OUTPATIENT)
Dept: FAMILY MEDICINE | Facility: CLINIC | Age: 68
End: 2018-10-08

## 2018-10-08 NOTE — TELEPHONE ENCOUNTER
Reached out to patient via phone with no answer.  Left detailed message with information below and encouraged him to call RN line at [phone] with any questions/concerns.  Will leave encounter open for now in case he calls back.    Paloma Salinas RN

## 2018-10-08 NOTE — TELEPHONE ENCOUNTER
He needs to talk to the surgeon who is doing his surgery. We would generally not stop anticoagulation for minor surgerie s but if surgeon requires then he can stop but needs to continue his aspirin. If needing to stop needs to be stopped more than 1 day before the surgery. (usually 5 days before and sometime 7 days before.) stopping just one day before will not prevent the bleeding caused by plavix.

## 2018-10-08 NOTE — TELEPHONE ENCOUNTER
Reason for Call:  Other call back    Detailed comments: patient states he is having surgery on his eyelids tomorrow, 10/9, and he is wondering if he should go off his plavix for the surgery. Please call to advise    Phone Number Patient can be reached at: Home number on file [phone] (home)    Best Time: anytime    Can we leave a detailed message on this number? YES    Call taken on 10/8/2018 at 10:54 AM by Myron Roper

## 2018-10-29 ENCOUNTER — OFFICE VISIT (OUTPATIENT)
Dept: FAMILY MEDICINE | Facility: CLINIC | Age: 68
End: 2018-10-29
Payer: COMMERCIAL

## 2018-10-29 VITALS
BODY MASS INDEX: 30.96 KG/M2 | HEIGHT: 69 IN | WEIGHT: 209 LBS | HEART RATE: 56 BPM | TEMPERATURE: 97.9 F | SYSTOLIC BLOOD PRESSURE: 126 MMHG | DIASTOLIC BLOOD PRESSURE: 64 MMHG

## 2018-10-29 DIAGNOSIS — Z86.73 HISTORY OF CVA (CEREBROVASCULAR ACCIDENT): ICD-10-CM

## 2018-10-29 DIAGNOSIS — Z13.89 SCREENING FOR DIABETIC PERIPHERAL NEUROPATHY: ICD-10-CM

## 2018-10-29 DIAGNOSIS — Z23 NEED FOR PROPHYLACTIC VACCINATION AGAINST STREPTOCOCCUS PNEUMONIAE (PNEUMOCOCCUS): ICD-10-CM

## 2018-10-29 DIAGNOSIS — Z13.5 SCREENING FOR DIABETIC RETINOPATHY: ICD-10-CM

## 2018-10-29 DIAGNOSIS — G81.94 LEFT HEMIPARESIS (H): ICD-10-CM

## 2018-10-29 DIAGNOSIS — I10 ESSENTIAL HYPERTENSION WITH GOAL BLOOD PRESSURE LESS THAN 140/90: ICD-10-CM

## 2018-10-29 DIAGNOSIS — I25.810 CORONARY ARTERY DISEASE INVOLVING CORONARY BYPASS GRAFT OF NATIVE HEART WITHOUT ANGINA PECTORIS: ICD-10-CM

## 2018-10-29 DIAGNOSIS — N40.1 BENIGN PROSTATIC HYPERPLASIA WITH URINARY FREQUENCY: ICD-10-CM

## 2018-10-29 DIAGNOSIS — R35.0 BENIGN PROSTATIC HYPERPLASIA WITH URINARY FREQUENCY: ICD-10-CM

## 2018-10-29 DIAGNOSIS — E78.5 HYPERLIPIDEMIA LDL GOAL <70: ICD-10-CM

## 2018-10-29 DIAGNOSIS — R73.9 ELEVATED BLOOD SUGAR: Primary | ICD-10-CM

## 2018-10-29 LAB — HBA1C MFR BLD: 5 % (ref 0–5.6)

## 2018-10-29 PROCEDURE — 83036 HEMOGLOBIN GLYCOSYLATED A1C: CPT | Performed by: FAMILY MEDICINE

## 2018-10-29 PROCEDURE — 82043 UR ALBUMIN QUANTITATIVE: CPT | Performed by: FAMILY MEDICINE

## 2018-10-29 PROCEDURE — 99214 OFFICE O/P EST MOD 30 MIN: CPT | Performed by: FAMILY MEDICINE

## 2018-10-29 PROCEDURE — 36415 COLL VENOUS BLD VENIPUNCTURE: CPT | Performed by: FAMILY MEDICINE

## 2018-10-29 RX ORDER — TAMSULOSIN HYDROCHLORIDE 0.4 MG/1
0.4 CAPSULE ORAL DAILY
Qty: 90 CAPSULE | Refills: 1 | Status: SHIPPED | OUTPATIENT
Start: 2018-10-29 | End: 2019-07-22

## 2018-10-29 RX ORDER — ATORVASTATIN CALCIUM 10 MG/1
10 TABLET, FILM COATED ORAL DAILY
Qty: 90 TABLET | Refills: 3 | Status: SHIPPED | OUTPATIENT
Start: 2018-10-29 | End: 2019-07-22

## 2018-10-29 RX ORDER — CARVEDILOL 25 MG/1
25 TABLET ORAL 2 TIMES DAILY WITH MEALS
Qty: 180 TABLET | Refills: 3 | Status: SHIPPED | OUTPATIENT
Start: 2018-10-29 | End: 2019-07-22

## 2018-10-29 RX ORDER — PANTOPRAZOLE SODIUM 40 MG/1
40 TABLET, DELAYED RELEASE ORAL DAILY
Qty: 90 TABLET | Refills: 3 | Status: CANCELLED | OUTPATIENT
Start: 2018-10-29

## 2018-10-29 NOTE — LETTER
October 31, 2018      Andrew Torres  [address] [facility] [address]        Dear Andrew,       The results of your recent lab tests were within normal limits. Enclosed is a copy of these results.     Sincerely,        Ishan Andrade MD    Results for orders placed or performed in visit on 10/29/18   Hemoglobin A1c   Result Value Ref Range    Hemoglobin A1C 5.0 0 - 5.6 %   Albumin Random Urine Quantitative with Creat Ratio   Result Value Ref Range    Creatinine Urine 136 mg/dL    Albumin Urine mg/L 13 mg/L    Albumin Urine mg/g Cr 9.56 0 - 17 mg/g Cr

## 2018-10-29 NOTE — MR AVS SNAPSHOT
After Visit Summary   10/29/2018    Andrew Torres    MRN: [id number]           Patient Information     Date Of Birth          1950        Visit Information        Provider Department      10/29/2018 12:20 PM Ishan Andrade MD [facility]        Today's Diagnoses     Type 2 diabetes mellitus with other circulatory complications (H)    -  1    Coronary artery disease involving coronary bypass graft of native heart without angina pectoris        Essential hypertension with goal blood pressure less than 140/90        History of CVA (cerebrovascular accident)        Left hemiparesis (H)        Hyperlipidemia LDL goal <70        Screening for diabetic retinopathy        Screening for diabetic peripheral neuropathy        Need for prophylactic vaccination against Streptococcus pneumoniae (pneumococcus)        Benign prostatic hyperplasia with urinary frequency          Care Instructions    Orders Placed This Encounter     FOOT EXAM  NO CHARGE [98058.114]     Hemoglobin A1c     Last Lab Result: Hemoglobin A1C (%)       Date                     Value                 06/25/2018               5.3              ----------     Albumin Random Urine Quantitative with Creat Ratio     Last Lab Result: No results found for: MICROALBUMIN     Order Specific Question:   Includes     Answer:   with Creat Ratio     atorvastatin (LIPITOR) 10 MG tablet     Sig: Take 1 tablet (10 mg) by mouth daily     Dispense:  90 tablet     Refill:  3     carvedilol (COREG) 25 MG tablet     Sig: Take 1 tablet (25 mg) by mouth 2 times daily (with meals)     Dispense:  180 tablet     Refill:  3     Stop Pantoprozol    Continue Aspirin and Plavix        Lab Results   Component Value Date    A1C 5.0 10/29/2018    A1C 5.3 06/25/2018    A1C 5.7 03/21/2018    A1C 5.8 11/15/2017    A1C 6.0 07/27/2017                 Follow-ups after your visit        Who to contact     If you have questions or need follow up  "information about today's clinic visit or your schedule please contact [facility] directly at [phone].  Normal or non-critical lab and imaging results will be communicated to you by MyChart, letter or phone within 4 business days after the clinic has received the results. If you do not hear from us within 7 days, please contact the clinic through MyChart or phone. If you have a critical or abnormal lab result, we will notify you by phone as soon as possible.  Submit refill requests through [employer] or call your pharmacy and they will forward the refill request to us. Please allow 3 business days for your refill to be completed.          Additional Information About Your Visit        Care EveryWhere ID     This is your Care EveryWhere ID. This could be used by other organizations to access your [place] medical records  [id number]        Your Vitals Were     Pulse Temperature Height BMI (Body Mass Index)          56 97.9  F (36.6  C) (Oral) 5' 8.75\" (1.746 m) 31.09 kg/m2         Blood Pressure from Last 3 Encounters:   10/29/18 126/64   09/19/18 136/80   06/25/18 138/64    Weight from Last 3 Encounters:   10/29/18 209 lb (94.8 kg)   09/19/18 209 lb 6.4 oz (95 kg)   06/25/18 212 lb (96.2 kg)              We Performed the Following     Albumin Random Urine Quantitative with Creat Ratio     FOOT EXAM  NO CHARGE [52085.114]     Hemoglobin A1c          Today's Medication Changes          These changes are accurate as of 10/29/18  1:17 PM.  If you have any questions, ask your nurse or doctor.               Start taking these medicines.        Dose/Directions    tamsulosin 0.4 MG capsule   Commonly known as:  FLOMAX   Used for:  Benign prostatic hyperplasia with urinary frequency   Started by:  Ishan Andrade MD        Dose:  0.4 mg   Take 1 capsule (0.4 mg) by mouth daily   Quantity:  90 capsule   Refills:  1            Where to get your medicines      These medications were sent to [facility]'s " [facility] [address]  [address] [place] [place]  [address], [place], [place] [address]     Phone:  [phone]     atorvastatin 10 MG tablet    carvedilol 25 MG tablet    tamsulosin 0.4 MG capsule                Primary Care Provider Office Phone # Fax #    Ishan Andrade [phone]       [address] [place] [address]        Equal Access to Services     TIGRE VANCE : Hadii aad ku hadasho Soomaali, waaxda luqadaha, qaybta kaalmada adeegyada, waxay idiin hayaan adeeg kharash la'angie . So [facility] [phone].    ATENCIÓN: Si habla español, tiene a huber disposición servicios gratuitos de asistencia lingüística. [facility] [phone].    We comply with applicable federal civil rights laws and [place] laws. We do not discriminate on the basis of race, color, national origin, age, disability, sex, sexual orientation, or gender identity.            Thank you!     Thank you for choosing [facility]  for your care. Our goal is always to provide you with excellent care. Hearing back from our patients is one way we can continue to improve our services. Please take a few minutes to complete the written survey that you may receive in the mail after your visit with us. Thank you!             Your Updated Medication List - Protect others around you: Learn how to safely use, store and throw away your medicines at www.disposemymeds.org.          This list is accurate as of 10/29/18  1:17 PM.  Always use your most recent med list.                   Brand Name Dispense Instructions for use Diagnosis    amLODIPine 10 MG tablet    NORVASC    90 tablet    Take 1 tablet (10 mg) by mouth daily    Essential hypertension with goal blood pressure less than 140/90, Coronary artery disease involving coronary bypass graft of native heart without angina pectoris, History of CVA (cerebrovascular accident)       ASPIRIN 81 PO           atorvastatin 10 MG tablet    LIPITOR     90 tablet    Take 1 tablet (10 mg) by mouth daily    Essential hypertension with goal blood pressure less than 140/90, Coronary artery disease involving coronary bypass graft of native heart without angina pectoris, Hyperlipidemia LDL goal <70       B-12 1000 MCG Caps      Take  by mouth.        carvedilol 25 MG tablet    COREG    180 tablet    Take 1 tablet (25 mg) by mouth 2 times daily (with meals)    Essential hypertension with goal blood pressure less than 140/90, Coronary artery disease involving coronary bypass graft of native heart without angina pectoris, History of CVA (cerebrovascular accident)       clopidogrel 75 MG tablet    PLAVIX    90 tablet    Take 1 tablet (75 mg) by mouth daily    Coronary artery disease involving coronary bypass graft of native heart without angina pectoris, History of CVA (cerebrovascular accident)       lisinopril 10 MG tablet    PRINIVIL/ZESTRIL    180 tablet    Take 1 tablet (10 mg) by mouth 2 times daily    Essential hypertension with goal blood pressure less than 140/90, Coronary artery disease involving coronary bypass graft of native heart without angina pectoris       multivitamin, therapeutic with minerals Tabs tablet     100 tablet    Take 1 tablet by mouth daily    Left hemiparesis (H), History of CVA (cerebrovascular accident), Type 2 diabetes mellitus with other circulatory complication, without long-term current use of insulin (H), Loss of weight       nitroGLYcerin 0.4 MG sublingual tablet    NITROSTAT    2 tablet    1 tablet now    Chest pain, CAD (coronary artery disease)       omega-3 fatty acids 1200 MG capsule      Take 1 capsule by mouth daily.        order for DME     60 Can    Equipment being ordered: Ensure supplement    Weight loss       tamsulosin 0.4 MG capsule    FLOMAX    90 capsule    Take 1 capsule (0.4 mg) by mouth daily    Benign prostatic hyperplasia with urinary frequency

## 2018-10-29 NOTE — PROGRESS NOTES
SUBJECTIVE:   Andrew Torres is a 68 year old male who presents to clinic today for the following health issues:      Diabetes Follow-up      Patient is checking blood sugars: not at all    Diabetic concerns: None     Symptoms of hypoglycemia (low blood sugar): none     Paresthesias (numbness or burning in feet) or sores: No     Date of last diabetic eye exam: has not had one    Diabetes Management Resources    Hyperlipidemia Follow-Up      Rate your low fat/cholesterol diet?: good    Taking statin?  Yes, no muscle aches from statin    Other lipid medications/supplements?:  none    Hypertension Follow-up      Outpatient blood pressures are not being checked.    Low Salt Diet: low salt    BP Readings from Last 2 Encounters:   09/19/18 136/80   06/25/18 138/64     Hemoglobin A1C (%)   Date Value   10/29/2018 5.0   06/25/2018 5.3     LDL Cholesterol Calculated (mg/dL)   Date Value   06/25/2018 69   11/15/2017 151 (H)       Amount of exercise or physical activity: None    Problems taking medications regularly: Yes,  problems remembering to take    Medication side effects: none    Diet: regular (no restrictions)        Also notes urgency of urination. Has had incontinence since stroke. No dysuria. UA's have been normal     Problem list and histories reviewed & adjusted, as indicated.  Additional history: as documented    Recent Labs   Lab Test  10/29/18   1220  06/25/18   1321  04/30/18   1540  03/21/18   1401  11/15/17   1112  10/23/17   11/17/15   0836 09/29/15   07/07/15   1230  10/22/12   A1C  5.0  5.3   --   5.7   --    < >   --    < >   --    --    < >  5.6   < >  5.5   LDL   --   69   --    --   151*   --    --    --    --   63   --    --    < >  73   HDL   --   36*   --    --   48   --    --    --    --   38   --    --    < >  48   TRIG   --   132   --    --   119   --    --    --    --   90   --    --    < >  74   ALT   --    --    --    --    --    --   32   --   17   --    --    --    --   19   CR   --    "0.98  0.94  1.24  0.90   < >  0.8   < >  0.78   --    < >  0.90   < >  1.3   GFRESTIMATED   --   76  80  58*  83   --    --    < >  >90  Non  GFR Calc     --    --   84   < >   --    GFRESTBLACK   --   >90  >90  70  >90   --    --    < >  >90   GFR Calc     --    --   >90   GFR Calc     < >   --    POTASSIUM   --   4.5  4.2  4.6   --    < >  3.7   < >  4.2   --    < >  3.7   < >  4.3   TSH   --    --    --    --   0.97   --    --    --    --    --    --   0.60   < >   --     < > = values in this interval not displayed.      BP Readings from Last 3 Encounters:   10/29/18 126/64   09/19/18 136/80   06/25/18 138/64    Wt Readings from Last 3 Encounters:   10/29/18 209 lb (94.8 kg)   09/19/18 209 lb 6.4 oz (95 kg)   06/25/18 212 lb (96.2 kg)                    Reviewed and updated as needed this visit by clinical staff  Tobacco  Allergies  Meds  Med Hx  Surg Hx  Fam Hx  Soc Hx      Reviewed and updated as needed this visit by Provider         ROS:  Constitutional, HEENT, cardiovascular, pulmonary, gi and gu systems are negative, except as otherwise noted.    OBJECTIVE:     /64 (Cuff Size: Adult Large)  Pulse 56  Temp 97.9  F (36.6  C) (Oral)  Ht 5' 8.75\" (1.746 m)  Wt 209 lb (94.8 kg)  BMI 31.09 kg/m2  Body mass index is 31.09 kg/(m^2).   GENERAL: alert and no distress  NECK: no adenopathy, no asymmetry, masses, or scars and thyroid normal to palpation  RESP: lungs clear to auscultation - no rales, rhonchi or wheezes  CV: regular rate and rhythm, normal S1 S2, no S3 or S4, no murmur, click or rub, no peripheral edema and peripheral pulses strong  ABDOMEN: soft, nontender, no hepatosplenomegaly, no masses and bowel sounds normal  MS: left arm weakness  [place]: normal facial symmetry. Normal speech.  Left arm weakness at baseline  SKIN: no suspicious lesions or rashes and cyst on left hip consistent with sebaceous cyst.   PSYCH: mentation appears normal, " affect normal/bright    Diagnostic Test Results:  Results for orders placed or performed in visit on 10/29/18 (from the past 24 hour(s))   Hemoglobin A1c   Result Value Ref Range    Hemoglobin A1C 5.0 0 - 5.6 %       ASSESSMENT:       PLAN:   Elevated blood sugar  Comment: reviewed chart and no consistent reading for diabetes diagnosis. Will remove from problem list  Plan: Hemoglobin A1c, Albumin Random Urine         Quantitative with Creat Ratio            (I25.810) Coronary artery disease involving coronary bypass graft of native heart without angina pectoris  Comment: stable  Plan: atorvastatin (LIPITOR) 10 MG tablet, carvedilol        (COREG) 25 MG tablet            (I10) Essential hypertension with goal blood pressure less than 140/90  Comment: controlled  Plan: atorvastatin (LIPITOR) 10 MG tablet, carvedilol        (COREG) 25 MG tablet            (Z86.73) History of CVA (cerebrovascular accident)  Comment: stable. Mild symptoms of left facial weakness but normal exam today.   Plan: carvedilol (COREG) 25 MG tablet        Take ASA    (G81.94) Left hemiparesis (H)  Comment: stable  Plan:  Continue present medications      (E78.5) Hyperlipidemia LDL goal <70  Comment: controlled  Plan: atorvastatin (LIPITOR) 10 MG tablet              (Z23) Need for prophylactic vaccination against Streptococcus pneumoniae (pneumococcus)  Comment:   Plan: declined    (N40.1,  R35.0) Benign prostatic hyperplasia with urinary frequency  Comment: possible neurogenic bladder. Will do trial of flomax  Plan: tamsulosin (FLOMAX) 0.4 MG capsule        Follow up pending response. PSA normal.           See Patient Instructions    Ishan Andrade MD, MD  [facility]

## 2018-10-30 LAB
CREAT UR-MCNC: 136 MG/DL
MICROALBUMIN UR-MCNC: 13 MG/L
MICROALBUMIN/CREAT UR: 9.56 MG/G CR (ref 0–17)

## 2019-01-07 ENCOUNTER — TELEPHONE (OUTPATIENT)
Dept: FAMILY MEDICINE | Facility: CLINIC | Age: 69
End: 2019-01-07

## 2019-01-07 ENCOUNTER — ALLIED HEALTH/NURSE VISIT (OUTPATIENT)
Dept: NURSING | Facility: CLINIC | Age: 69
End: 2019-01-07
Payer: COMMERCIAL

## 2019-01-07 VITALS
TEMPERATURE: 97.1 F | SYSTOLIC BLOOD PRESSURE: 155 MMHG | DIASTOLIC BLOOD PRESSURE: 71 MMHG | HEART RATE: 62 BPM | OXYGEN SATURATION: 96 %

## 2019-01-07 DIAGNOSIS — R20.0 NUMBNESS: Primary | ICD-10-CM

## 2019-01-07 PROCEDURE — 99207 ZZC NO CHARGE NURSE ONLY: CPT

## 2019-01-07 NOTE — PROGRESS NOTES
"Patient comes in to clinic stating that his hand and arm went numb starting this morning. His arm is no longer numb, but the hand remains so. He has a history of left sided stroke in February of last year and left sided hemiparesis. He is currently taking plavix daily. He denies chest pain, pain in arm, swelling, confusion, change in mental status, weakness, change is speech, loss of bladder or bowel control, color change, shortness of breath, headaches, vision changes or dizziness. He says that his \"balance seems off.\"  He walks in a straight line and appears well balanced. His hand is pink in color, well perfused with capillary refill less than 3 seconds, and a strong radial pulse. He denies any recent injury to his arm or hand. Upon assessment he has no facial drooping, his strength is equal in both hands, and he's able to lift both up his arms up at shoulder level. Heart rate regular and lungs clear to auscultation. Huddled with Anne-Marie Honeycutt who states okay for patient to wait until tomorrow to be seen. Offered an appointment but patient only wants to see PCP. At that point I recommended urgent care tonight which he declined. I advised that he go to ER if any red flag symptoms mentioned above appear and he verbalized understanding. Will route a message to PCP to see if able to fit him in this week.     Myron Shields RN            "

## 2019-01-07 NOTE — TELEPHONE ENCOUNTER
"Route to Dr. Andrade. Please see my note below from a walk in on 1/7/19. Patient did not want to take an appointment with another provider and is wondering if you can fit him in this week.      Patient comes in to clinic stating that his hand and arm went numb starting today. The arm is no longer numb, but the hand remains so. He has a history of left sided stroke in February of last year and left sided hemiparesis. He is currently taking plavix daily. He denies chest pain, pain in arm, swelling, confusion, change in mental status, weakness, change is speech, loss of bladder or bowel control, color change, shortness of breath, headaches, vision changes or dizziness. He says that his \"balance seems off.\"  He walks in a straight line and appears well balanced. His hand is pink in color, well perfused with capillary refill less than 3 second, and a strong radial pulse. He denies any recent injury to his arm or hand. Upon assessment he has no facial drooping, his strength is equal in both hand, and he's able to lift both up his arms up at shoulder level. Heart rate regular and lungs clear to auscultation. Huddled with Anne-Marie Honeycutt who states okay for patient to wait until tomorrow to be seen. Offered an appointment but patient only wants to see PCP. At that point I recommended urgent care tonight which he declined.  I advised that he go to ER if any red flag symptoms mentioned above appear and he verbalized understanding. Will route a message to PCP to see if able to fit him in this week.     Myron Shields RN    "

## 2019-01-08 NOTE — TELEPHONE ENCOUNTER
Flag for TC, please call patient and schedule him in one of Dr. Andrade's same day slots for next week.    Myron Shields RN

## 2019-01-08 NOTE — TELEPHONE ENCOUNTER
See if he could see Aylin tomorrow and I will see him with her.  I did review with Aylin and she has an opening at 12:40

## 2019-01-08 NOTE — TELEPHONE ENCOUNTER
I'm happy to see pt next week, but wouldn't it make more sense for Dr. Andrade to see him in one of his same day available  Slots given he is his PCP? That is if he cannot come in tomorrow.

## 2019-01-08 NOTE — TELEPHONE ENCOUNTER
Called, line is busy. Will try again. Will hold the 1240 spot for now. Will take hold off in not able to reach patient.    Myron Shields RN

## 2019-01-08 NOTE — TELEPHONE ENCOUNTER
Called and spoke with patient. He is unable to come in tomorrow because he has someone coming out to his house due to electrical problems. He did say that the numbness has improved, but is still present. He asked to get scheduled for next week. I scheduled an appointment with Aylin Mayorga next Tuesday at 1100. Will route to PCP and Aylin Mayorga as CALVIN.    Myron Shields RN

## 2019-01-08 NOTE — TELEPHONE ENCOUNTER
Called patient but no answer and no voicemail, phone just rang but no answer, will try again.    Thank you,  Mague OTERO    NE Team Ellie

## 2019-01-09 NOTE — TELEPHONE ENCOUNTER
Spoke to patient, rescheduled him for Monday 01/14/19 with PCP.    Thank you,  Mague OTERO    NE Team Ellie

## 2019-01-14 ENCOUNTER — OFFICE VISIT (OUTPATIENT)
Dept: FAMILY MEDICINE | Facility: CLINIC | Age: 69
End: 2019-01-14
Payer: COMMERCIAL

## 2019-01-14 VITALS
SYSTOLIC BLOOD PRESSURE: 132 MMHG | WEIGHT: 212.38 LBS | HEIGHT: 69 IN | DIASTOLIC BLOOD PRESSURE: 68 MMHG | TEMPERATURE: 97.9 F | HEART RATE: 60 BPM | BODY MASS INDEX: 31.45 KG/M2

## 2019-01-14 DIAGNOSIS — M54.50 CHRONIC MIDLINE LOW BACK PAIN WITHOUT SCIATICA: ICD-10-CM

## 2019-01-14 DIAGNOSIS — R13.12 OROPHARYNGEAL DYSPHAGIA: Primary | ICD-10-CM

## 2019-01-14 DIAGNOSIS — G89.29 CHRONIC MIDLINE LOW BACK PAIN WITHOUT SCIATICA: ICD-10-CM

## 2019-01-14 DIAGNOSIS — G81.94 LEFT HEMIPARESIS (H): ICD-10-CM

## 2019-01-14 DIAGNOSIS — Z86.73 HISTORY OF CVA (CEREBROVASCULAR ACCIDENT): ICD-10-CM

## 2019-01-14 PROCEDURE — 99214 OFFICE O/P EST MOD 30 MIN: CPT | Performed by: FAMILY MEDICINE

## 2019-01-14 ASSESSMENT — MIFFLIN-ST. JEOR: SCORE: 1709.74

## 2019-01-14 NOTE — PROGRESS NOTES
"  SUBJECTIVE:   Andrew Torres is a 68 year old male who presents to clinic today for the following health issues:    Patient is here today to follow up on left hand numbness he walked into the clinic for on 1/7/19 (see copy of RN note below).  Numbness is better but does complain of having some pain now:    Progress Notes   Myron Shields, RN (Registered Nurse)      Patient comes in to clinic stating that his hand and arm went numb starting this morning. His arm is no longer numb, but the hand remains so. He has a history of left sided stroke in February of last year and left sided hemiparesis. He is currently taking plavix daily. He denies chest pain, pain in arm, swelling, confusion, change in mental status, weakness, change is speech, loss of bladder or bowel control, color change, shortness of breath, headaches, vision changes or dizziness. He says that his \"balance seems off.\"  He walks in a straight line and appears well balanced. His hand is pink in color, well perfused with capillary refill less than 3 seconds, and a strong radial pulse. He denies any recent injury to his arm or hand. Upon assessment he has no facial drooping, his strength is equal in both hands, and he's able to lift both up his arms up at shoulder level. Heart rate regular and lungs clear to auscultation. Huddled with Anne-Marie Honeycutt who states okay for patient to wait until tomorrow to be seen. Offered an appointment but patient only wants to see PCP. At that point I recommended urgent care tonight which he declined. I advised that he go to ER if any red flag symptoms mentioned above appear and he verbalized understanding. Will route a message to PCP to see if able to fit him in this week.      Myron Shields RN          Patient reports recent history of numbness of his lefthand. This lasted about 24 hours, the numbness was also accompanied by weakness. He could not hold a cup without dropping it. He reports that his symptoms is " intermittent, but reports that when it usually returns at night.    Swallowing:  Patient wife has noticed that the patient is having difficulty swallowing once again. According to his wife this has been going on for a few months. He is having difficulty with swallowing liquids. Notes intermittent choking . No symptoms for food getting stuck. More problems with thin liquids. Has had swallowing eval in the past. He feels things have changed.     Urinary incontinence.  Patient reports that he continue to have urinary incontinence, he is unable to control his urination.     Back pain:  Patient reports that every morning when he wakes up, he continues to have back ache. He has started taking extra aspirin to help eluviate the back pain.      Problem list and histories reviewed & adjusted, as indicated.  Additional history: as documented    Patient Active Problem List   Diagnosis     CAD (coronary artery disease) stent placed 2006- life long plavix recommended     Carotid stenosis stent placed right 2010     Advanced directives, counseling/discussion     Coronary artery disease involving coronary bypass graft of native heart without angina pectoris     Gastroesophageal reflux disease without esophagitis     Essential hypertension with goal blood pressure less than 140/90     Hyperlipidemia LDL goal <70     Uric acid kidney stone     History of CVA (cerebrovascular accident)     Left hemiparesis (H)     History reviewed. No pertinent surgical history.    Social History     Tobacco Use     Smoking status: Never Smoker     Smokeless tobacco: Never Used   Substance Use Topics     Alcohol use: Yes     Comment: 2-3 drinks per week     Family History   Problem Relation Age of Onset     Hypertension Mother      Hypertension Father      Heart Disease Paternal Grandmother      Cancer Sister          Current Outpatient Medications   Medication Sig Dispense Refill     amLODIPine (NORVASC) 10 MG tablet Take 1 tablet (10 mg) by mouth  daily 90 tablet 3     ASPIRIN 81 PO        atorvastatin (LIPITOR) 10 MG tablet Take 1 tablet (10 mg) by mouth daily 90 tablet 3     carvedilol (COREG) 25 MG tablet Take 1 tablet (25 mg) by mouth 2 times daily (with meals) 180 tablet 3     clopidogrel (PLAVIX) 75 MG tablet Take 1 tablet (75 mg) by mouth daily 90 tablet 3     Cyanocobalamin (B-12) 1000 MCG CAPS Take  by mouth.       lisinopril (PRINIVIL/ZESTRIL) 10 MG tablet Take 1 tablet (10 mg) by mouth 2 times daily 180 tablet 3     multivitamin, therapeutic with minerals (MULTI-VITAMIN) TABS tablet Take 1 tablet by mouth daily 100 tablet 3     nitroglycerin (NITROSTAT) 0.4 MG SL tablet 1 tablet now 2 tablet 0     omega-3 fatty acids (FISH OIL) 1200 MG capsule Take 1 capsule by mouth daily.       order for DME Equipment being ordered: Ensure supplement 60 Can 11     tamsulosin (FLOMAX) 0.4 MG capsule Take 1 capsule (0.4 mg) by mouth daily 90 capsule 1     Allergies   Allergen Reactions     Hmg-Coa-R Inhibitors Muscle Pain (Myalgia)     Sulfa Drugs      Recent Labs   Lab Test 10/29/18  1220 06/25/18  1321 04/30/18  1540 03/21/18  1401 11/15/17  1112  10/23/17  11/17/15  0836 09/29/15  07/07/15  1230  10/22/12   A1C 5.0 5.3  --  5.7  --    < >  --    < >  --   --    < > 5.6   < > 5.5   LDL  --  69  --   --  151*  --   --   --   --  63  --   --    < > 73   HDL  --  36*  --   --  48  --   --   --   --  38  --   --    < > 48   TRIG  --  132  --   --  119  --   --   --   --  90  --   --    < > 74   ALT  --   --   --   --   --   --  32  --  17  --   --   --   --  19   CR  --  0.98 0.94 1.24 0.90   < > 0.8   < > 0.78  --    < > 0.90   < > 1.3   GFRESTIMATED  --  76 80 58* 83  --   --    < > >90  Non  GFR Calc    --   --  84   < >  --    GFRESTBLACK  --  >90 >90 70 >90  --   --    < > >90   GFR Calc    --   --  >90  African American GFR Calc     < >  --    POTASSIUM  --  4.5 4.2 4.6  --    < > 3.7   < > 4.2  --    < > 3.7   < > 4.3   TSH  --   " --   --   --  0.97  --   --   --   --   --   --  0.60   < >  --     < > = values in this interval not displayed.      BP Readings from Last 3 Encounters:   01/14/19 132/68   01/07/19 155/71   10/29/18 126/64    Wt Readings from Last 3 Encounters:   01/14/19 212 lb 6 oz (96.3 kg)   10/29/18 209 lb (94.8 kg)   09/19/18 209 lb 6.4 oz (95 kg)                  Labs reviewed in EPIC    Reviewed and updated as needed this visit by clinical staff       Reviewed and updated as needed this visit by Provider         ROS:  Constitutional, HEENT, cardiovascular, pulmonary, gi and gu systems are negative, except as otherwise noted.    This document serves as a record of the services and decisions personally performed by CAROL FREEMAN. It was created on his behalf by Josemanuel Toro trained medical scribes. The creation of this document is based on the provider's statements to the medical scribe. Josemanuel Toro, January 14, 2019 10:47 AM      OBJECTIVE:     /68 (BP Location: Right arm, Cuff Size: Adult Large)   Pulse 60   Temp 97.9  F (36.6  C) (Oral)   Ht 5' 8.75\" (1.746 m)   Wt 212 lb 6 oz (96.3 kg)   BMI 31.59 kg/m    Body mass index is 31.59 kg/m .  GENERAL: healthy, alert and no distress  NECK: no adenopathy, no asymmetry, masses, or scars and thyroid normal to palpation  RESP: lungs clear to auscultation - no rales, rhonchi or wheezes  CV: regular rate and rhythm, normal S1 S2, no S3 or S4, no murmur, click or rub, no peripheral edema and peripheral pulses strong  MS:decrease hand  on left hand,  tense paraspinal lumbar muscle tightness, left side is tighter than right, no gross musculoskeletal defects noted, no edema  SKIN: no suspicious lesions or rashes  NEURO: strength 4+ on right  hip flexors,   mentation intact and speech normal, negative Tinel and Phalen sign, negative tenderness of ulnar notch  PSYCH: mentation appears normal, affect normal/bright    Diagnostic Test Results:  No results found for this or " any previous visit (from the past 24 hour(s)).    ASSESSMENT/PLAN:     (R13.12) Oropharyngeal dysphagia  (primary encounter diagnosis)  Comment: Patient has noticed marked worsening in swallowing, especially with liquid  Plan: SPEECH THERAPY REFERRAL        Follow up pending results    (G81.94) Left hemiparesis (H)  Comment: left hand numbness and weakness  Plan: SPEECH THERAPY REFERRAL, [facility] PT, HAND, AND         CHIROPRACTIC REFERRAL         Recent isolated episode of numbness in hand. Possible nerve impingement. Reviewed how to monitor for dermatomal involvement and for location of numbness if CTS or Ulnar compression. Follow up if recurs.     (Z86.73) History of CVA (cerebrovascular accident)  Comment: unchanged  Plan: SPEECH THERAPY REFERRAL, [facility] PT, HAND, AND         CHIROPRACTIC REFERRAL        Follow up pending referral    (M54.5,  G89.29) Chronic midline low back pain without sciatica  Comment: Patient has started to experience chronic low back pain, marked during the morning  Plan: [facility] PT, HAND, AND CHIROPRACTIC REFERRAL        Follow up after referral.          Follow up in 1 month for recheck    The information in this document, created by the medical scribosvaldo Toro, accurately reflects the services I personally performed and the decisions made by me. I have reviewed and approved this document for accuracy prior to leaving the patient care area.      Ishna Andrade MD, MD  [facility]

## 2019-01-14 NOTE — PATIENT INSTRUCTIONS
You can temporarly stopTamsalosin to see if it helps the symptoms or worsen it.    Working with physical therapy for back pain.    Orders Placed This Encounter     SPEECH THERAPY REFERRAL     Standing Status:   Future     Standing Expiration Date:   1/14/2020     Referral Type:   Therapeutic Services     Number of Visits Requested:   1     [facility] PT[facility] HAND, AND CHIROPRACTIC REFERRAL     Standing Status:   Future     Standing Expiration Date:   1/14/2020     Referral Type:   [facility] Physical Therapy       Number of Visits Requested:   1     [facility]     Discharged by : Dorothy Rmaires CMA      If you have any questions regarding your visit please contact your care team:     Team Gold                Clinic Hours Telephone Number     Dr. Ishan Lloyd, CNP   7am-7pm  Monday - Thursday   7am-5pm  Fridays  [phone]   (Appointment scheduling available 24/7)     RN Line  [phone] option 2     Urgent Care - [place] - 11am-9pm Monday-Friday Saturday-Sunday- 9am-5pm     [place] -   5pm-9pm Monday-Friday Saturday-Sunday- 9am-5pm    [phone] - [place]    [phone] - [place]     For a Price Quote for your services, please call our Consumer Price Line at [phone].     What options do I have for visits at the clinic other than the traditional office visit?     To expand how we care for you, many of our providers are utilizing electronic visits (e-visits) and telephone visits, when medically appropriate, for interactions with their patients rather than a visit in the clinic. We also offer nurse visits for many medical concerns. Just like any other service, we will bill your insurance company for this type of visit based on time spent on the phone with your provider. Not all insurance companies cover these visits. Please check with your medical insurance if this type of visit is covered. You will be responsible for any  charges that are not paid by your insurance.     E-visits via Syniversehart: generally incur a $35.00 fee.     Telephone visits:  Time spent on the phone: *charged based on time that is spent on the phone in increments of 10 minutes. Estimated cost:   5-10 mins $30.00   11-20 mins. $59.00   21-30 mins. $85.00       Use Syniversehart (secure email communication and access to your chart) to send your primary care provider a message or make an appointment. Ask someone on your Team how to sign up for Jibbigot.     As always, Thank you for trusting us with your health care needs!      [facility]:    Scheduling Appointments  [facility]  Call: [phone]    [facility]  Call: [phone]    [facility]  Call: [phone]    For Gastroenterology referrals   [facility]   Clinics and Surgery Center, 4th Floor   [address] [place] [address]   Appointments: [phone]    WHERE TO GO FOR CARE?    Clinic    Make an appointment if you:       Are sick (cold, cough, flu, sore throat, earache or in pain).       Have a small injury (sprain, small cut, burn or broken bone).       Need a physical exam, Pap smear, vaccine or prescription refill.       Have questions about your health or medicines.    To reach us:      Call [phone] ([phone]). Open 24 hours every day. (For counseling services, call [phone].)    Log into icanbuy at [email or web address]. (Visit [email or web address] to create an account.) Hospital emergency room    An emergency is a serious or life- threatening problem that must be treated right away.    Call 707 or get to the hospital if you have:      Very bad or sudden:            - Chest pain or pressure         - Bleeding         - Head or belly pain         - Dizziness or trouble seeing, walking or                          Speaking      Problems breathing      Blood in your vomit or you  are coughing up blood      A major injury (knocked out, loss of a finger or limb, rape, broken bone protruding from skin)    A mental health crisis. (Or call the Mental Health Crisis line at 1-364.425.4037 or Suicide Prevention Hotline at 1-699.874.7667.)    Open 24 hours every day. You don't need an appointment.     Urgent care    Visit urgent care for sickness or small injuries when the clinic is closed. You don't need an appointment. To check hours or find an urgent care near you, visit [email or web address]. Online care    Get online care from [facility] for more than 70 common problems, like colds, allergies and infections. Open 24 hours every day at:   [email or web address]   Need help deciding?    For advice about where to be seen, you may call your clinic and ask to speak with a nurse. We're here for you 24 hours every day.         If you are deaf or hard of hearing, please let us know. We provide many free services including sign language interpreters, oral interpreters, TTYs, telephone amplifiers, note takers and written materials.

## 2019-01-28 ENCOUNTER — THERAPY VISIT (OUTPATIENT)
Dept: PHYSICAL THERAPY | Facility: CLINIC | Age: 69
End: 2019-01-28
Payer: COMMERCIAL

## 2019-01-28 DIAGNOSIS — Z86.73 HISTORY OF CVA (CEREBROVASCULAR ACCIDENT): ICD-10-CM

## 2019-01-28 DIAGNOSIS — G89.29 CHRONIC MIDLINE LOW BACK PAIN WITHOUT SCIATICA: Primary | ICD-10-CM

## 2019-01-28 DIAGNOSIS — G81.94 LEFT HEMIPARESIS (H): ICD-10-CM

## 2019-01-28 DIAGNOSIS — M54.50 CHRONIC MIDLINE LOW BACK PAIN WITHOUT SCIATICA: Primary | ICD-10-CM

## 2019-01-28 PROCEDURE — 97161 PT EVAL LOW COMPLEX 20 MIN: CPT | Mod: GP | Performed by: PHYSICAL THERAPIST

## 2019-01-28 PROCEDURE — 97110 THERAPEUTIC EXERCISES: CPT | Mod: GP | Performed by: PHYSICAL THERAPIST

## 2019-01-28 PROCEDURE — G8982 BODY POS GOAL STATUS: HCPCS | Mod: GP | Performed by: PHYSICAL THERAPIST

## 2019-01-28 PROCEDURE — G8981 BODY POS CURRENT STATUS: HCPCS | Mod: GP | Performed by: PHYSICAL THERAPIST

## 2019-01-28 PROCEDURE — 97530 THERAPEUTIC ACTIVITIES: CPT | Mod: GP | Performed by: PHYSICAL THERAPIST

## 2019-01-28 NOTE — PROGRESS NOTES
[facility] Initial Evaluation -- Lumbar    Date: January 28, 2019  Andrew Torres is a 68 year old male with a Back condition.   Referral: FP  Work mechanical stresses:  Retired  Employment status:    Leisure mechanical stresses: Sedentary  Functional disability score (ROBERT/STarT Back):  See Flow sheet  VAS score (0-10): 5-6/10, ranges 4-9/10  Patient goals/expectations:  Dressing sometimes painful, sitting 20-30 min tolerance    HISTORY:    Present symptoms: bilat LB, Rt>Lt.  No buttock or leg pain   Pain quality (sharp/shooting/stabbing/aching/burning/cramping):  Aching w/ occas sharp shooting pain   Paresthesia (yes/no):  no    Present since (onset date): late Dec 2018,  MD referral 1-.     Symptoms (improving/unchanging/worsening):  unchanging.     Symptoms commenced as a result of: unknown   Condition occurred in the following environment:   unknown     Symptoms at onset (back/thigh/leg): Rt LB then progressed to Lt LB too  Constant symptoms (back/thigh/leg): back  Intermittent symptoms (back/thigh/leg): none    Symptoms are made worse with the following: Sometimes Bending, Always Sitting, Sometimes Rising, Always Standing, Sometimes Walking and Time of day - Always as the day progresses   Symptoms are made better with the following: Other - sometimes asprin and lying down for a little while    Disturbed sleep (yes/no):  no Sleeping postures (prone/sup/side R/L): side Rt    Previous episodes (0/1-5/6-10/11+): 0 Year of first episode:     Previous history: none  Previous treatments: none      Specific Questions:  Cough/Sneeze/Strain (pos/neg): pos  Bowel/Bladder (normal/abnormal): sometimes difficulty getting to the bathroom in time - MD gave medication for it  Gait (normal/abnormal): normal  Medications (nil/NSAIDS/analg/steroids/anticoag/other):  Other - High blood pressure and was taking a medication for the bladder but has stopped it.  Medical allergies:  sulfa  General health  (excellent/good/fair/poor):  fair  Pertinent medical history:  Heart problems, High blood pressure, Stroke, Chest pain and Incontinence  Imaging (None/Xray/MRI/Other):  none  Recent or major surgery (yes/no):  Heart bypass and stints - a yr ago  Night pain (yes/no): no  Accidents (yes/no): no  Unexplained weight loss (yes/no): no  Barriers at home: none  Other red flags: N&T Lt hand    EXAMINATION    Posture:   Sitting (good/fair/poor): poor  Standing (good/fair/poor):poor - mod kyphosis  Lordosis (red/acc/normal): Increased  Correction of posture (better/worse/no effect): worse inititialy then better    Lateral Shift (right/left/nil): nil  Relevant (yes/no):  na  Other Observations:     Neurological:    Motor deficit:  normal  Reflexes:  na  Sensory deficit:  na  Dural signs:  Seated Knee ext - mod pain in calf bilaterally    Movement Loss:   Walter Mod Min Nil Pain   Flexion       X  To distal lower leg - pulling legs, no reversal of Lordosis   Extension   X  Center LB dev Rt   Side Gliding R    X Center LBP   Side Gliding L    X Center LBP     Test Movements:   During: produces, abolishes, increases, decreases, no effect, centralizing, peripheralizing   After: better, worse, no better, no worse, no effect, centralized, peripheralized    Pretest symptoms standing: mild pain across LB   Symptoms During Symptoms After ROM increased ROM decreased No Effect   FIS        Rep FIS        EIS        Rep EIS        Pretest symptoms lying: Pain across LB    Symptoms During Symptoms After ROM increased ROM decreased No Effect   MAGALI No Effect    No Effect         Rep MAGALI No Effect    Worse  Center LB      X ROM, no pain w/ standing, less pain w/ extension.   EIL        Rep EIL            Static Tests:  Sitting slouched:  Unable to slouch LB, pain ant rib cage   Sitting erect:  decr pain ant rib cage, NE LB  Standing slouched na  Standing erect:  na  Lying prone in extension:  na Long sitting:  na    Other Tests:    Rep FISit x10  - P ant rib cage, NW, Incr Trunk AROM, NE pulling in calves w/ knee ext.    Provisional Classification:  Derangement - Bilateral, symmetrical, symptoms above knee    Principle of Management:  Education:  Discussed tightness in back, need to stretch that tightness, Importance of high repetitions during the day, and interrupt sitting.  Instr in HEP.   Equipment provided:  none  Mechanical therapy (Y/N):  y   Extension principle:  MAGALI x10 5-6 x/day  Lateral Principle:    Flexion principle:    Other:      ASSESSMENT/PLAN:    Patient is a 68 year old male with lumbar complaints.    Patient has the following significant findings with corresponding treatment plan.                Diagnosis 1:  Midline LBP  Pain -  hot/cold therapy, manual therapy, education, directional preference exercise and home program  Decreased ROM/flexibility - manual therapy, therapeutic exercise and home program  Decreased function - therapeutic activities and home program  Impaired posture - neuro re-education and home program    Therapy Evaluation Codes:   1) History comprised of:   Personal factors that impact the plan of care:      None.    Comorbidity factors that impact the plan of care are:      Chest pain, Heart problems, High blood pressure, Numbness/tingling and Stroke.     Medications impacting care: High blood pressure.  2) Examination of Body Systems comprised of:   Body structures and functions that impact the plan of care:      Lumbar spine.   Activity limitations that impact the plan of care are:      Bending, Dressing, Sitting, Standing and Walking.  3) Clinical presentation characteristics are:   Stable/Uncomplicated.  4) Decision-Making    Low complexity using standardized patient assessment instrument and/or measureable assessment of functional outcome.  Cumulative Therapy Evaluation is: Low complexity.    Previous and current functional limitations:  (See Goal Flow Sheet for this information)    Short term and Long term goals:  (See Goal Flow Sheet for this information)     Communication ability:  Patient appears to be able to clearly communicate and understand verbal and written communication and follow directions correctly.  Treatment Explanation - The following has been discussed with the patient:   RX ordered/plan of care  Anticipated outcomes  Possible risks and side effects  This patient would benefit from PT intervention to resume normal activities.   Rehab potential is good.    Frequency:  1 X week, once daily  Duration:  for 8 weeks  Discharge Plan:  Achieve all LTG.  Independent in home treatment program.  Reach maximal therapeutic benefit.    Please refer to the daily flowsheet for treatment today, total treatment time and time spent performing 1:1 timed codes.

## 2019-01-30 PROBLEM — M54.50 CHRONIC MIDLINE LOW BACK PAIN WITHOUT SCIATICA: Status: ACTIVE | Noted: 2019-01-30

## 2019-01-30 PROBLEM — G89.29 CHRONIC MIDLINE LOW BACK PAIN WITHOUT SCIATICA: Status: ACTIVE | Noted: 2019-01-30

## 2019-04-08 DIAGNOSIS — I25.810 CORONARY ARTERY DISEASE INVOLVING CORONARY BYPASS GRAFT OF NATIVE HEART WITHOUT ANGINA PECTORIS: ICD-10-CM

## 2019-04-08 DIAGNOSIS — Z86.73 HISTORY OF CVA (CEREBROVASCULAR ACCIDENT): ICD-10-CM

## 2019-04-08 NOTE — TELEPHONE ENCOUNTER
"Requested Prescriptions   Pending Prescriptions Disp Refills     clopidogrel (PLAVIX) 75 MG tablet [Pharmacy Med Name: CLOPIDOGREL 75MG    TAB]  Last Written Prescription Date:  3/21/2018  Last Fill Quantity: 90 tablet,  # refills: 3   Last office visit: 1/14/2019 with prescribing provider:  JERSON Andrade   Future Office Visit:   Next 5 appointments (look out 90 days)    Apr 29, 2019 12:20 PM CDT  [facility] [address] [place] [id number] [phone]          90 tablet 3     Sig: TAKE 1 TABLET BY MOUTH ONCE DAILY       Plavix Failed - 4/8/2019 11:56 AM        Failed - Normal HGB on file in past 12 months     Recent Labs   Lab Test 03/21/18  1401   HGB 14.0               Failed - Normal Platelets on file in past 12 months     Recent Labs   Lab Test 03/21/18  1401                  Passed - No active PPI on record unless is Protonix        Passed - Recent (12 mo) or future (30 days) visit within the authorizing provider's specialty     Patient had office visit in the last 12 months or has a visit in the next 30 days with authorizing provider or within the authorizing provider's specialty.  See \"Patient Info\" tab in inbasket, or \"Choose Columns\" in Meds & Orders section of the refill encounter.              Passed - Medication is active on med list        Passed - Patient is age 18 or older          "

## 2019-04-09 NOTE — TELEPHONE ENCOUNTER
"Route refill request to PCP due to failed protocol related to labs. Please see below. Last clinic visit was 1/14/19 with recommendation for f/u in one month.  Note pasted below.  Patient has upcoming visit on 4/29/19.    Paloma Salinas RN    \"(Z86.73) History of CVA (cerebrovascular accident)  Comment: unchanged  Plan: SPEECH THERAPY REFERRAL, [facility] PT, HAND, AND         CHIROPRACTIC REFERRAL        Follow up pending referral\"      "

## 2019-04-10 RX ORDER — CLOPIDOGREL BISULFATE 75 MG/1
TABLET ORAL
Qty: 90 TABLET | Refills: 3 | Status: SHIPPED | OUTPATIENT
Start: 2019-04-10 | End: 2020-06-03

## 2019-04-15 PROBLEM — G89.29 CHRONIC MIDLINE LOW BACK PAIN WITHOUT SCIATICA: Status: RESOLVED | Noted: 2019-01-30 | Resolved: 2019-04-15

## 2019-04-15 PROBLEM — M54.50 CHRONIC MIDLINE LOW BACK PAIN WITHOUT SCIATICA: Status: RESOLVED | Noted: 2019-01-30 | Resolved: 2019-04-15

## 2019-04-29 ENCOUNTER — OFFICE VISIT (OUTPATIENT)
Dept: FAMILY MEDICINE | Facility: CLINIC | Age: 69
End: 2019-04-29
Payer: COMMERCIAL

## 2019-04-29 VITALS
TEMPERATURE: 97.4 F | HEIGHT: 70 IN | SYSTOLIC BLOOD PRESSURE: 112 MMHG | WEIGHT: 215.6 LBS | RESPIRATION RATE: 16 BRPM | OXYGEN SATURATION: 98 % | HEART RATE: 51 BPM | DIASTOLIC BLOOD PRESSURE: 70 MMHG | BODY MASS INDEX: 30.86 KG/M2

## 2019-04-29 DIAGNOSIS — I10 ESSENTIAL HYPERTENSION WITH GOAL BLOOD PRESSURE LESS THAN 140/90: Primary | ICD-10-CM

## 2019-04-29 DIAGNOSIS — E11.59 TYPE 2 DIABETES MELLITUS WITH OTHER CIRCULATORY COMPLICATION, WITHOUT LONG-TERM CURRENT USE OF INSULIN (H): ICD-10-CM

## 2019-04-29 DIAGNOSIS — G81.94 LEFT HEMIPARESIS (H): ICD-10-CM

## 2019-04-29 DIAGNOSIS — Z86.73 HISTORY OF CVA (CEREBROVASCULAR ACCIDENT): ICD-10-CM

## 2019-04-29 DIAGNOSIS — I25.810 CORONARY ARTERY DISEASE INVOLVING CORONARY BYPASS GRAFT OF NATIVE HEART WITHOUT ANGINA PECTORIS: ICD-10-CM

## 2019-04-29 DIAGNOSIS — Z23 NEED FOR PROPHYLACTIC VACCINATION WITH TETANUS-DIPHTHERIA (TD): ICD-10-CM

## 2019-04-29 LAB — HBA1C MFR BLD: 5.3 % (ref 0–5.6)

## 2019-04-29 PROCEDURE — 83036 HEMOGLOBIN GLYCOSYLATED A1C: CPT | Performed by: FAMILY MEDICINE

## 2019-04-29 PROCEDURE — 80061 LIPID PANEL: CPT | Performed by: FAMILY MEDICINE

## 2019-04-29 PROCEDURE — 80048 BASIC METABOLIC PNL TOTAL CA: CPT | Performed by: FAMILY MEDICINE

## 2019-04-29 PROCEDURE — 99214 OFFICE O/P EST MOD 30 MIN: CPT | Performed by: FAMILY MEDICINE

## 2019-04-29 PROCEDURE — 36415 COLL VENOUS BLD VENIPUNCTURE: CPT | Performed by: FAMILY MEDICINE

## 2019-04-29 RX ORDER — LISINOPRIL 10 MG/1
10 TABLET ORAL 2 TIMES DAILY
Qty: 180 TABLET | Refills: 3 | Status: SHIPPED | OUTPATIENT
Start: 2019-04-29 | End: 2020-06-03

## 2019-04-29 ASSESSMENT — PAIN SCALES - GENERAL: PAINLEVEL: NO PAIN (0)

## 2019-04-29 ASSESSMENT — MIFFLIN-ST. JEOR: SCORE: 1739.21

## 2019-04-29 NOTE — PROGRESS NOTES
SUBJECTIVE:   Andrew Torres is a 69 year old male who presents to clinic today for the following   health issues:      Hyperlipidemia Follow-Up      Rate your low fat/cholesterol diet?: good    Taking statin?  Yes, no muscle aches from statin    Other lipid medications/supplements?:  Fish oil/Omega 3, dose 1000 without side effects    Hypertension Follow-up      Outpatient blood pressures are not being checked.    Low Salt Diet: low salt    Vascular Disease Follow-up:  Coronary Artery Disease (CAD)      Chest pain or pressure, left side neck or arm pain: No    Shortness of breath/increased sweats/nausea with exertion: No    Pain in calves walking 1-2 blocks: Yes left leg     Worsened or new symptoms since last visit: No    Nitroglycerin use: no    Daily aspirin use: Yes      Amount of exercise or physical activity: None    Problems taking medications regularly: No    Medication side effects: none    Diet: low salt    Lab Results   Component Value Date    A1C 5.3 04/29/2019    A1C 5.0 10/29/2018    A1C 5.3 06/25/2018    A1C 5.7 03/21/2018    A1C 5.8 11/15/2017         Overall doing well. He had been given diagnosis of diaetes but unclear why. Updated problem list and HM labs do not support diagnosis.     Additional history: as documented    Reviewed  and updated as needed this visit by clinical staff         Reviewed and updated as needed this visit by Provider         Recent Labs   Lab Test 04/29/19  1121 10/29/18  1220 06/25/18  1321 04/30/18  1540  11/15/17  1112  10/23/17  11/17/15  0836 09/29/15  07/07/15  1230  10/22/12   A1C 5.3 5.0 5.3  --    < >  --    < >  --    < >  --   --    < > 5.6   < > 5.5   LDL  --   --  69  --   --  151*  --   --   --   --  63  --   --    < > 73   HDL  --   --  36*  --   --  48  --   --   --   --  38  --   --    < > 48   TRIG  --   --  132  --   --  119  --   --   --   --  90  --   --    < > 74   ALT  --   --   --   --   --   --   --  32  --  17  --   --   --   --  19   CR  --   " --  0.98 0.94   < > 0.90   < > 0.8   < > 0.78  --    < > 0.90   < > 1.3   GFRESTIMATED  --   --  76 80   < > 83  --   --    < > >90  Non  GFR Calc    --   --  84   < >  --    GFRESTBLACK  --   --  >90 >90   < > >90  --   --    < > >90   GFR Calc    --   --  >90  African American GFR Calc     < >  --    POTASSIUM  --   --  4.5 4.2   < >  --    < > 3.7   < > 4.2  --    < > 3.7   < > 4.3   TSH  --   --   --   --   --  0.97  --   --   --   --   --   --  0.60   < >  --     < > = values in this interval not displayed.      BP Readings from Last 3 Encounters:   04/29/19 112/70   01/14/19 132/68   01/07/19 155/71    Wt Readings from Last 3 Encounters:   04/29/19 97.8 kg (215 lb 9.6 oz)   01/14/19 96.3 kg (212 lb 6 oz)   10/29/18 94.8 kg (209 lb)                    ROS:  Constitutional, HEENT, cardiovascular, pulmonary, gi and gu systems are negative, except as otherwise noted.    OBJECTIVE:     /70 (BP Location: Right arm, Patient Position: Chair, Cuff Size: Adult Regular)   Pulse 51   Temp 97.4  F (36.3  C) (Oral)   Resp 16   Ht 1.778 m (5' 10\")   Wt 97.8 kg (215 lb 9.6 oz)   SpO2 98%   BMI 30.94 kg/m    Body mass index is 30.94 kg/m .   GENERAL: alert and no distress  NECK: no adenopathy, no asymmetry, masses, or scars and thyroid normal to palpation  RESP: lungs clear to auscultation - no rales, rhonchi or wheezes  CV: regular rate and rhythm, normal S1 S2, no S3 or S4, no murmur, click or rub, no peripheral edema and peripheral pulses strong  ABDOMEN: soft, nontender, no hepatosplenomegaly, no masses and bowel sounds normal  MS: improved strength of left hand and left side.    Diagnostic Test Results:  Results for orders placed or performed in visit on 04/29/19 (from the past 24 hour(s))   Hemoglobin A1c   Result Value Ref Range    Hemoglobin A1C 5.3 0 - 5.6 %     Results for orders placed or performed in visit on 04/29/19   Hemoglobin A1c   Result Value Ref Range    " Hemoglobin A1C 5.3 0 - 5.6 %       ASSESSMENT:       PLAN:   (I10) Essential hypertension with goal blood pressure less than 140/90  (primary encounter diagnosis)  Comment: controlled  Plan: Basic metabolic panel, Lipid panel reflex to         direct LDL Fasting, lisinopril         (PRINIVIL/ZESTRIL) 10 MG tablet            (E11.59) Type 2 diabetes mellitus with other circulatory complication, without long-term current use of insulin (H)  Comment: review of labs and hx and he does not meet criteria for diagnosis of diabetes.   Plan: Hemoglobin A1c, Basic metabolic panel, Lipid         panel reflex to direct LDL Fasting        Update problem list    (I25.810) Coronary artery disease involving coronary bypass graft of native heart without angina pectoris  Comment: stable  Plan: Basic metabolic panel, Lipid panel reflex to         direct LDL Fasting, lisinopril         (PRINIVIL/ZESTRIL) 10 MG tablet            (Z86.73) History of CVA (cerebrovascular accident)  Comment: improving  Plan:  Continue present medications and cares    (G81.94) Left hemiparesis (H)  Comment: improving  Plan:  Continue present medications      (Z23) Need for prophylactic vaccination with tetanus-diphtheria (Td)  Comment: patient declined  Plan: advised that he should have it and review risks.         Patient Instructions     Orders Placed This Encounter     Hemoglobin A1c     Last Lab Result: Hemoglobin A1C (%)       Date                     Value                 10/29/2018               5.0              ----------     Basic metabolic panel     Lipid panel reflex to direct LDL Fasting     Last Lab Result: LDL Cholesterol Calculated (mg/dL)       Date                     Value                 06/25/2018               69               ----------     Order Specific Question:   Perform labs while fasting or non-fasting?     Answer:   Fasting     lisinopril (PRINIVIL/ZESTRIL) 10 MG tablet     Sig: Take 1 tablet (10 mg) by mouth 2 times daily      Dispense:  180 tablet     Refill:  3     [facility]     Discharged by : Dorothy Ramires CMA     If you have any questions regarding your visit please contact your care team:     Team Gold                Clinic Hours Telephone Number     Dr. Ishan Lloyd, CNP   7am-7pm  Monday - Thursday   7am-5pm  Fridays  [phone]   (Appointment scheduling available 24/7)     RN Line  [phone] option 2     Urgent Care - [place] - 11am-9pm Monday-Friday Saturday-Sunday- 9am-5pm     [place] -   5pm-9pm Monday-Friday Saturday-Sunday- 9am-5pm    [phone] - [place]    [phone] - [place]     For a Price Quote for your services, please call our Consumer Price Line at [phone].     What options do I have for visits at the clinic other than the traditional office visit?     To expand how we care for you, many of our providers are utilizing electronic visits (e-visits) and telephone visits, when medically appropriate, for interactions with their patients rather than a visit in the clinic. We also offer nurse visits for many medical concerns. Just like any other service, we will bill your insurance company for this type of visit based on time spent on the phone with your provider. Not all insurance companies cover these visits. Please check with your medical insurance if this type of visit is covered. You will be responsible for any charges that are not paid by your insurance.     E-visits via BiggiFi: generally incur a $45.00 fee.     Telephone visits:  Time spent on the phone: *charged based on time that is spent on the phone in increments of 10 minutes. Estimated cost:   5-10 mins $30.00   11-20 mins. $59.00   21-30 mins. $85.00       Use BiggiFi (secure email communication and access to your chart) to send your primary care provider a message or make an appointment. Ask someone on your Team how to sign up for  travelfox.     As always, Thank you for trusting us with your health care needs!      [facility]:    Scheduling Appointments  [facility]  Call: [phone]    [facility]  Call: [phone]    [facility]  Call: [phone]    For Gastroenterology referrals   [facility]   Clinics and Surgery Center, 4th Floor   [address] [place] [address]   Appointments: [phone]    WHERE TO GO FOR CARE?    Clinic    Make an appointment if you:       Are sick (cold, cough, flu, sore throat, earache or in pain).       Have a small injury (sprain, small cut, burn or broken bone).       Need a physical exam, Pap smear, vaccine or prescription refill.       Have questions about your health or medicines.    To reach us:      Call [phone] ([phone]). Open 24 hours every day. (For counseling services, call [phone].)    Log into travelfox at [email or web address]. (Visit [email or web address] to create an account.) Hospital emergency room    An emergency is a serious or life- threatening problem that must be treated right away.    Call 780 or get to the hospital if you have:      Very bad or sudden:            - Chest pain or pressure         - Bleeding         - Head or belly pain         - Dizziness or trouble seeing, walking or                          Speaking      Problems breathing      Blood in your vomit or you are coughing up blood      A major injury (knocked out, loss of a finger or limb, rape, broken bone protruding from skin)    A mental health crisis. (Or call the Mental Health Crisis line at 1-144.181.8242 or Suicide Prevention Hotline at 1-916.144.6277.)    Open 24 hours every day. You don't need an appointment.     Urgent care    Visit urgent care for sickness or small injuries when the clinic is closed. You don't need an appointment. To check hours or find an urgent care near you,  visit [email or web address]. Online care    Get online care from [facility] for more than 70 common problems, like colds, allergies and infections. Open 24 hours every day at:   [email or web address]   Need help deciding?    For advice about where to be seen, you may call your clinic and ask to speak with a nurse. We're here for you 24 hours every day.         If you are deaf or hard of hearing, please let us know. We provide many free services including sign language interpreters, oral interpreters, TTYs, telephone amplifiers, note takers and written materials.         '      Ishan Andrade MD, MD  [facility]

## 2019-04-29 NOTE — LETTER
May 1, 2019      Andrew Torres  [address] [facility] [address]        Dear Mr. Torres     The results of your recent lab tests were within normal limits. The tests that are slightly abnormal are not concerning.  Enclosed is a copy of these results.     Results for orders placed or performed in visit on 04/29/19   Hemoglobin A1c   Result Value Ref Range    Hemoglobin A1C 5.3 0 - 5.6 %   Basic metabolic panel   Result Value Ref Range    Sodium 141 133 - 144 mmol/L    Potassium 4.1 3.4 - 5.3 mmol/L    Chloride 108 94 - 109 mmol/L    Carbon Dioxide 26 20 - 32 mmol/L    Anion Gap 7 3 - 14 mmol/L    Glucose 122 (H) 70 - 99 mg/dL    Urea Nitrogen 13 7 - 30 mg/dL    Creatinine 1.00 0.66 - 1.25 mg/dL    GFR Estimate 76 >60 mL/min/[1.73_m2]    GFR Estimate If Black 88 >60 mL/min/[1.73_m2]    Calcium 9.4 8.5 - 10.1 mg/dL   Lipid panel reflex to direct LDL Fasting   Result Value Ref Range    Cholesterol 160 <200 mg/dL    Triglycerides 160 (H) <150 mg/dL    HDL Cholesterol 43 >39 mg/dL    LDL Cholesterol Calculated 85 <100 mg/dL    Non HDL Cholesterol 117 <130 mg/dL       If you have any further questions or problems, please contact our    office.     Sincerely,     Ishan Andrade MD/enedelia

## 2019-04-29 NOTE — PATIENT INSTRUCTIONS
Orders Placed This Encounter     Hemoglobin A1c     Last Lab Result: Hemoglobin A1C (%)       Date                     Value                 10/29/2018               5.0              ----------     Basic metabolic panel     Lipid panel reflex to direct LDL Fasting     Last Lab Result: LDL Cholesterol Calculated (mg/dL)       Date                     Value                 06/25/2018               69               ----------     Order Specific Question:   Perform labs while fasting or non-fasting?     Answer:   Fasting     lisinopril (PRINIVIL/ZESTRIL) 10 MG tablet     Sig: Take 1 tablet (10 mg) by mouth 2 times daily     Dispense:  180 tablet     Refill:  3     [facility]     Discharged by : Dorothy Ramires CMA     If you have any questions regarding your visit please contact your care team:     Team Gold                Clinic Hours Telephone Number     Dr. Ishan Lloyd, [facility]   7am-7pm  Monday - Thursday   7am-5pm  Fridays  [phone]   (Appointment scheduling available 24/7)     RN Line  [phone] option 2     Urgent Care - [place] - 11am-9pm Monday-Friday Saturday-Sunday- 9am-5pm     [place] -   5pm-9pm Monday-Friday Saturday-Sunday- 9am-5pm    [phone] - [place]    [phone] - [place]     For a Price Quote for your services, please call our Consumer Price Line at [phone].     What options do I have for visits at the clinic other than the traditional office visit?     To expand how we care for you, many of our providers are utilizing electronic visits (e-visits) and telephone visits, when medically appropriate, for interactions with their patients rather than a visit in the clinic. We also offer nurse visits for many medical concerns. Just like any other service, we will bill your insurance company for this type of visit based on time spent on the phone with your provider. Not all insurance  companies cover these visits. Please check with your medical insurance if this type of visit is covered. You will be responsible for any charges that are not paid by your insurance.     E-visits via cVidyahart: generally incur a $45.00 fee.     Telephone visits:  Time spent on the phone: *charged based on time that is spent on the phone in increments of 10 minutes. Estimated cost:   5-10 mins $30.00   11-20 mins. $59.00   21-30 mins. $85.00       Use PVPower (secure email communication and access to your chart) to send your primary care provider a message or make an appointment. Ask someone on your Team how to sign up for PVPower.     As always, Thank you for trusting us with your health care needs!      [facility]:    Scheduling Appointments  [place] [facility]  Call: [phone]    [place] [facility]  Call: [phone]    [facility]  Call: [phone]    For Gastroenterology referrals   [facility], 4th Floor   [address] [place] [id number]   Appointments: [phone]    WHERE TO GO FOR CARE?    Clinic    Make an appointment if you:       Are sick (cold, cough, flu, sore throat, earache or in pain).       Have a small injury (sprain, small cut, burn or broken bone).       Need a physical exam, Pap smear, vaccine or prescription refill.       Have questions about your health or medicines.    To reach us:      Call [phone] ([phone]). Open 24 hours every day. (For counseling services, call [phone].)    Log into PVPower at [email or web address]. (Visit [email or web address] to create an account.) Hospital emergency room    An emergency is a serious or life- threatening problem that must be treated right away.    Call 761 or get to the hospital if you have:      Very bad or sudden:            - Chest pain or pressure         - Bleeding         - Head or belly pain          - Dizziness or trouble seeing, walking or                          Speaking      Problems breathing      Blood in your vomit or you are coughing up blood      A major injury (knocked out, loss of a finger or limb, rape, broken bone protruding from skin)    A mental health crisis. (Or call the Mental Health Crisis line at 1-725.944.3633 or Suicide Prevention Hotline at 1-423.480.6572.)    Open 24 hours every day. You don't need an appointment.     Urgent care    Visit urgent care for sickness or small injuries when the clinic is closed. You don't need an appointment. To check hours or find an urgent care near you, visit [email or web address]. Online care    Get online care from [facility] for more than 70 common problems, like colds, allergies and infections. Open 24 hours every day at:   [email or web address]   Need help deciding?    For advice about where to be seen, you may call your clinic and ask to speak with a nurse. We're here for you 24 hours every day.         If you are deaf or hard of hearing, please let us know. We provide many free services including sign language interpreters, oral interpreters, TTYs, telephone amplifiers, note takers and written materials.         '

## 2019-04-30 LAB
ANION GAP SERPL CALCULATED.3IONS-SCNC: 7 MMOL/L (ref 3–14)
BUN SERPL-MCNC: 13 MG/DL (ref 7–30)
CALCIUM SERPL-MCNC: 9.4 MG/DL (ref 8.5–10.1)
CHLORIDE SERPL-SCNC: 108 MMOL/L (ref 94–109)
CHOLEST SERPL-MCNC: 160 MG/DL
CO2 SERPL-SCNC: 26 MMOL/L (ref 20–32)
CREAT SERPL-MCNC: 1 MG/DL (ref 0.66–1.25)
GFR SERPL CREATININE-BSD FRML MDRD: 76 ML/MIN/{1.73_M2}
GLUCOSE SERPL-MCNC: 122 MG/DL (ref 70–99)
HDLC SERPL-MCNC: 43 MG/DL
LDLC SERPL CALC-MCNC: 85 MG/DL
NONHDLC SERPL-MCNC: 117 MG/DL
POTASSIUM SERPL-SCNC: 4.1 MMOL/L (ref 3.4–5.3)
SODIUM SERPL-SCNC: 141 MMOL/L (ref 133–144)
TRIGL SERPL-MCNC: 160 MG/DL

## 2019-06-04 DIAGNOSIS — I25.810 CORONARY ARTERY DISEASE INVOLVING CORONARY BYPASS GRAFT OF NATIVE HEART WITHOUT ANGINA PECTORIS: ICD-10-CM

## 2019-06-04 DIAGNOSIS — I10 ESSENTIAL HYPERTENSION WITH GOAL BLOOD PRESSURE LESS THAN 140/90: ICD-10-CM

## 2019-06-04 DIAGNOSIS — Z86.73 HISTORY OF CVA (CEREBROVASCULAR ACCIDENT): ICD-10-CM

## 2019-06-04 RX ORDER — AMLODIPINE BESYLATE 10 MG/1
TABLET ORAL
Qty: 90 TABLET | Refills: 3 | Status: SHIPPED | OUTPATIENT
Start: 2019-06-04 | End: 2020-07-27

## 2019-06-04 NOTE — TELEPHONE ENCOUNTER
"Requested Prescriptions   Signed Prescriptions Disp Refills    amLODIPine (NORVASC) 10 MG tablet 90 tablet 3     Sig: TAKE 1 TABLET BY MOUTH ONCE DAILY       Calcium Channel Blockers Protocol  Passed - 6/4/2019  2:07 PM        Passed - Blood pressure under 140/90 in past 12 months     BP Readings from Last 3 Encounters:   04/29/19 112/70   01/14/19 132/68   01/07/19 155/71                 Passed - Recent (12 mo) or future (30 days) visit within the authorizing provider's specialty     Patient had office visit in the last 12 months or has a visit in the next 30 days with authorizing provider or within the authorizing provider's specialty.  See \"Patient Info\" tab in inbasket, or \"Choose Columns\" in Meds & Orders section of the refill encounter.              Passed - Medication is active on med list        Passed - Patient is age 18 or older        Passed - Normal serum creatinine on file in past 12 months     Recent Labs   Lab Test 04/29/19  1121   CR 1.00             Prescription approved per [facility] Refill Protocol.  Mora Marks RN      "

## 2019-07-22 ENCOUNTER — OFFICE VISIT (OUTPATIENT)
Dept: FAMILY MEDICINE | Facility: CLINIC | Age: 69
End: 2019-07-22
Payer: COMMERCIAL

## 2019-07-22 VITALS
SYSTOLIC BLOOD PRESSURE: 120 MMHG | HEIGHT: 70 IN | HEART RATE: 56 BPM | DIASTOLIC BLOOD PRESSURE: 64 MMHG | WEIGHT: 221 LBS | BODY MASS INDEX: 31.64 KG/M2 | TEMPERATURE: 97.5 F

## 2019-07-22 DIAGNOSIS — R35.0 BENIGN PROSTATIC HYPERPLASIA WITH URINARY FREQUENCY: ICD-10-CM

## 2019-07-22 DIAGNOSIS — Z86.73 HISTORY OF CVA (CEREBROVASCULAR ACCIDENT): Primary | ICD-10-CM

## 2019-07-22 DIAGNOSIS — N40.1 BENIGN PROSTATIC HYPERPLASIA WITH URINARY FREQUENCY: ICD-10-CM

## 2019-07-22 DIAGNOSIS — I10 ESSENTIAL HYPERTENSION WITH GOAL BLOOD PRESSURE LESS THAN 140/90: ICD-10-CM

## 2019-07-22 DIAGNOSIS — I73.9 PAD (PERIPHERAL ARTERY DISEASE) (H): ICD-10-CM

## 2019-07-22 DIAGNOSIS — E78.5 HYPERLIPIDEMIA LDL GOAL <70: ICD-10-CM

## 2019-07-22 DIAGNOSIS — I25.810 CORONARY ARTERY DISEASE INVOLVING CORONARY BYPASS GRAFT OF NATIVE HEART WITHOUT ANGINA PECTORIS: ICD-10-CM

## 2019-07-22 PROBLEM — Z98.890 HISTORY OF CAROTID ENDARTERECTOMY: Status: ACTIVE | Noted: 2019-07-22

## 2019-07-22 PROCEDURE — 99214 OFFICE O/P EST MOD 30 MIN: CPT | Performed by: FAMILY MEDICINE

## 2019-07-22 PROCEDURE — 99207 C PAF COMPLETED  NO CHARGE: CPT | Performed by: FAMILY MEDICINE

## 2019-07-22 RX ORDER — ATORVASTATIN CALCIUM 10 MG/1
10 TABLET, FILM COATED ORAL DAILY
Qty: 90 TABLET | Refills: 3 | Status: SHIPPED | OUTPATIENT
Start: 2019-07-22 | End: 2020-12-20

## 2019-07-22 RX ORDER — TAMSULOSIN HYDROCHLORIDE 0.4 MG/1
0.4 CAPSULE ORAL DAILY
Qty: 90 CAPSULE | Refills: 3 | Status: SHIPPED | OUTPATIENT
Start: 2019-07-22 | End: 2021-01-08

## 2019-07-22 RX ORDER — CARVEDILOL 25 MG/1
25 TABLET ORAL 2 TIMES DAILY WITH MEALS
Qty: 180 TABLET | Refills: 3 | Status: SHIPPED | OUTPATIENT
Start: 2019-07-22 | End: 2020-09-01

## 2019-07-22 ASSESSMENT — MIFFLIN-ST. JEOR: SCORE: 1763.7

## 2019-07-22 NOTE — PATIENT INSTRUCTIONS
Continue to monitor diet     Orders Placed This Encounter     PAF COMPLETED     atorvastatin (LIPITOR) 10 MG tablet     Sig: Take 1 tablet (10 mg) by mouth daily     Dispense:  90 tablet     Refill:  3     carvedilol (COREG) 25 MG tablet     Sig: Take 1 tablet (25 mg) by mouth 2 times daily (with meals)     Dispense:  180 tablet     Refill:  3     tamsulosin (FLOMAX) 0.4 MG capsule     Sig: Take 1 capsule (0.4 mg) by mouth daily     Dispense:  90 capsule     Refill:  3

## 2019-07-22 NOTE — PROGRESS NOTES
Subjective     Andrew Torres is a 69 year old male who presents to clinic today for the following health issues:    HPI     Hypertension Follow-up      Do you check your blood pressure regularly outside of the clinic? No     Are you following a low salt diet? Yes    Are your blood pressures ever more than 140 on the top number (systolic) OR more   than 90 on the bottom number (diastolic), for example 140/90? n/a    Amount of exercise or physical activity: None    Problems taking medications regularly: No    Medication side effects: none    Diet: low salt      Patient Active Problem List   Diagnosis     CAD (coronary artery disease) stent placed 2006- life long plavix recommended     Carotid stenosis stent placed right 2010     Advanced directives, counseling/discussion     Coronary artery disease involving coronary bypass graft of native heart without angina pectoris     Gastroesophageal reflux disease without esophagitis     Essential hypertension with goal blood pressure less than 140/90     Hyperlipidemia LDL goal <70     Uric acid kidney stone     History of CVA (cerebrovascular accident)     Left hemiparesis (H)     PAD (peripheral artery disease) (H)-      History of carotid endarterectomy     History reviewed. No pertinent surgical history.    Social History     Tobacco Use     Smoking status: Never Smoker     Smokeless tobacco: Never Used   Substance Use Topics     Alcohol use: Yes     Comment: 2-3 drinks per week     Family History   Problem Relation Age of Onset     Hypertension Mother      Hypertension Father      Heart Disease Paternal Grandmother      Cancer Sister          Current Outpatient Medications   Medication Sig Dispense Refill     amLODIPine (NORVASC) 10 MG tablet TAKE 1 TABLET BY MOUTH ONCE DAILY 90 tablet 3     ASPIRIN 81 PO        atorvastatin (LIPITOR) 10 MG tablet Take 1 tablet (10 mg) by mouth daily 90 tablet 3     carvedilol (COREG) 25 MG tablet Take 1 tablet (25 mg) by mouth 2 times  "daily (with meals) 180 tablet 3     clopidogrel (PLAVIX) 75 MG tablet TAKE 1 TABLET BY MOUTH ONCE DAILY 90 tablet 3     Cyanocobalamin (B-12) 1000 MCG CAPS Take  by mouth.       lisinopril (PRINIVIL/ZESTRIL) 10 MG tablet Take 1 tablet (10 mg) by mouth 2 times daily 180 tablet 3     multivitamin, therapeutic with minerals (MULTI-VITAMIN) TABS tablet Take 1 tablet by mouth daily 100 tablet 3     nitroglycerin (NITROSTAT) 0.4 MG SL tablet 1 tablet now 2 tablet 0     omega-3 fatty acids (FISH OIL) 1200 MG capsule Take 1 capsule by mouth daily.       order for DME Equipment being ordered: Ensure supplement 60 Can 11     tamsulosin (FLOMAX) 0.4 MG capsule Take 1 capsule (0.4 mg) by mouth daily 90 capsule 3     BP Readings from Last 3 Encounters:   07/22/19 120/64   04/29/19 112/70   01/14/19 132/68    Wt Readings from Last 3 Encounters:   07/22/19 100.2 kg (221 lb)   04/29/19 97.8 kg (215 lb 9.6 oz)   01/14/19 96.3 kg (212 lb 6 oz)            Reviewed and updated as needed this visit by Provider  This document serves as a record of the services and decisions personally performed and made by Ishan Andrade MD. It was created on his behalf by Nayla Graham, a trained medical scribe. The creation of this document is based the provider's statements to the medical scribe.  Nayla Graham, July 22, 2019 11:15 AM            Review of Systems   ROS COMP: Constitutional, HEENT, cardiovascular, pulmonary, gi and gu systems are negative, except as otherwise noted.      Objective    /64 (Cuff Size: Adult Large)   Pulse 56   Temp 97.5  F (36.4  C) (Oral)   Ht 1.778 m (5' 10\")   Wt 100.2 kg (221 lb)   BMI 31.71 kg/m    Body mass index is 31.71 kg/m .  Physical Exam   GENERAL: over weight, alert and no distress  RESP: lungs clear to auscultation - no rales, rhonchi or wheezes  CV: regular rate and rhythm, normal S1 S2, no S3 or S4, no murmur, click or rub, no peripheral edema and peripheral pulses strong  ABDOMEN: soft, " "nontender, no hepatosplenomegaly, no masses and bowel sounds normal  MS: no gross musculoskeletal defects noted, no edema  PSYCH: mentation appears normal, affect normal/bright    Diagnostic Test Results:  Labs reviewed in Epic        Assessment & Plan     (Z86.73) History of CVA (cerebrovascular accident)  (primary encounter diagnosis)  Comment: Improving  Plan: carvedilol (COREG) 25 MG tablet        Continue with present medications and cares    (I10) Essential hypertension with goal blood pressure less than 140/90  Comment: Well controlled  Plan: atorvastatin (LIPITOR) 10 MG tablet, carvedilol        (COREG) 25 MG tablet        Continue with present medications    (I73.9) PAD (peripheral artery disease) (H)-   Comment: No concerns today   Plan: Will continue to monitor    (I25.810) Coronary artery disease involving coronary bypass graft of native heart without angina pectoris  Comment: Stable  Plan: atorvastatin (LIPITOR) 10 MG tablet, carvedilol        (COREG) 25 MG tablet            (E78.5) Hyperlipidemia LDL goal <70  Comment: Stable  Plan: atorvastatin (LIPITOR) 10 MG tablet        Will continue to monitor    (N40.1,  R35.0) Benign prostatic hyperplasia with urinary frequency  Comment: No concerns today, needs refill   Plan: tamsulosin (FLOMAX) 0.4 MG capsule        Continue with present medications       BMI:   Estimated body mass index is 31.71 kg/m  as calculated from the following:    Height as of this encounter: 1.778 m (5' 10\").    Weight as of this encounter: 100.2 kg (221 lb).   Weight management plan: Discussed healthy diet and exercise guidelines        Patient Instructions       Continue to monitor diet     Orders Placed This Encounter     PAF COMPLETED     atorvastatin (LIPITOR) 10 MG tablet     Sig: Take 1 tablet (10 mg) by mouth daily     Dispense:  90 tablet     Refill:  3     carvedilol (COREG) 25 MG tablet     Sig: Take 1 tablet (25 mg) by mouth 2 times daily (with meals)     Dispense:  180 " tablet     Refill:  3     tamsulosin (FLOMAX) 0.4 MG capsule     Sig: Take 1 capsule (0.4 mg) by mouth daily     Dispense:  90 capsule     Refill:  3           Return in about 4 months (around 11/22/2019).    The information in this document, created by the medical scribe for me, accurately reflects the services I personally performed and the decisions made by me. I have reviewed and approved this document for accuracy.     Ishan Andrade MD, MD  [facility]

## 2019-09-16 DIAGNOSIS — R63.4 LOSS OF WEIGHT: ICD-10-CM

## 2019-09-16 DIAGNOSIS — E11.59 TYPE 2 DIABETES MELLITUS WITH OTHER CIRCULATORY COMPLICATION, WITHOUT LONG-TERM CURRENT USE OF INSULIN (H): ICD-10-CM

## 2019-09-16 DIAGNOSIS — Z86.73 HISTORY OF CVA (CEREBROVASCULAR ACCIDENT): ICD-10-CM

## 2019-09-16 DIAGNOSIS — G81.94 LEFT HEMIPARESIS (H): ICD-10-CM

## 2019-09-16 NOTE — TELEPHONE ENCOUNTER
"Requested Prescriptions   Pending Prescriptions Disp Refills     multivitamin, therapeutic with minerals (THERA-VIT-M) TABS tablet [Pharmacy Med Name: THERA-M TAB]  Last Written Prescription Date:  6/25/2018  Last Fill Quantity: 100 tablet,  # refills: 3   Last office visit: 7/22/2019 with prescribing provider:  JERSON Andrade   Future Office Visit:   Next 5 appointments (look out 90 days)    Nov 05, 2019  1:20 PM CST  SHORT with Ishan Andrade MD  [facility] ([address] [address]  [phone]          100 tablet 3     Sig: TAKE 1 TABLET BY MOUTH ONCE DAILY       Vitamin Supplements (Adult) Protocol Passed - 9/16/2019  2:33 PM        Passed - High dose Vitamin D not ordered        Passed - Recent (12 mo) or future (30 days) visit within the authorizing provider's specialty     Patient had office visit in the last 12 months or has a visit in the next 30 days with authorizing provider or within the authorizing provider's specialty.  See \"Patient Info\" tab in inbasket, or \"Choose Columns\" in Meds & Orders section of the refill encounter.              Passed - Medication is active on med list          "

## 2019-09-17 RX ORDER — ASPIRIN 81 MG
TABLET, DELAYED RELEASE (ENTERIC COATED) ORAL
Qty: 100 TABLET | Refills: 3 | Status: SHIPPED | OUTPATIENT
Start: 2019-09-17 | End: 2020-12-11

## 2019-09-17 NOTE — TELEPHONE ENCOUNTER
Prescription approved per [facility] Refill Protocol.  Luis Daniel Collado RN

## 2019-11-05 ENCOUNTER — OFFICE VISIT (OUTPATIENT)
Dept: FAMILY MEDICINE | Facility: CLINIC | Age: 69
End: 2019-11-05
Payer: COMMERCIAL

## 2019-11-05 VITALS
HEART RATE: 50 BPM | BODY MASS INDEX: 32.57 KG/M2 | TEMPERATURE: 97.6 F | DIASTOLIC BLOOD PRESSURE: 58 MMHG | WEIGHT: 227 LBS | SYSTOLIC BLOOD PRESSURE: 130 MMHG

## 2019-11-05 DIAGNOSIS — R39.15 URINARY URGENCY: ICD-10-CM

## 2019-11-05 DIAGNOSIS — Z86.73 HISTORY OF CVA (CEREBROVASCULAR ACCIDENT): ICD-10-CM

## 2019-11-05 DIAGNOSIS — E11.59 TYPE 2 DIABETES MELLITUS WITH OTHER CIRCULATORY COMPLICATIONS (H): ICD-10-CM

## 2019-11-05 DIAGNOSIS — E66.3 OVERWEIGHT: ICD-10-CM

## 2019-11-05 DIAGNOSIS — I10 ESSENTIAL HYPERTENSION WITH GOAL BLOOD PRESSURE LESS THAN 140/90: Primary | ICD-10-CM

## 2019-11-05 LAB — HBA1C MFR BLD: 5.3 % (ref 0–5.6)

## 2019-11-05 PROCEDURE — 36415 COLL VENOUS BLD VENIPUNCTURE: CPT | Performed by: FAMILY MEDICINE

## 2019-11-05 PROCEDURE — 82043 UR ALBUMIN QUANTITATIVE: CPT | Performed by: FAMILY MEDICINE

## 2019-11-05 PROCEDURE — 83036 HEMOGLOBIN GLYCOSYLATED A1C: CPT | Performed by: FAMILY MEDICINE

## 2019-11-05 PROCEDURE — 99214 OFFICE O/P EST MOD 30 MIN: CPT | Performed by: FAMILY MEDICINE

## 2019-11-05 ASSESSMENT — PAIN SCALES - GENERAL: PAINLEVEL: NO PAIN (0)

## 2019-11-05 NOTE — PATIENT INSTRUCTIONS
Continue with healthy diet and exercise.   Limit carb intake.     Schedule appointment with urology to address concerns.     Orders Placed This Encounter     HEMOGLOBIN A1C     Last Lab Result: Hemoglobin A1C (%)       Date                     Value                 04/29/2019               5.3              ----------     Albumin Random Urine Quantitative with Creat Ratio     This test includes a Creatinine Ratio.  Do not order [id number] (Creatinine Random Urine)  Last Lab Result: Albumin Urine mg/g Cr (mg/g Cr)       Date                     Value                 10/29/2018               9.56             ----------  Creatinine Urine (mg/dL)       Date                     Value                 10/29/2018               136              ----------     Order Specific Question:   Includes     Answer:   with Creat Ratio     UROLOGY ADULT REFERRAL     Referral Priority:   Routine     Referral Type:   Consultation     Number of Visits Requested:   1

## 2019-11-05 NOTE — LETTER
[facility]  [address] [place] [id number]  [phone]                                                                                                November 8, 2019    Andrew Torres  [address] [facility] [address]        Dear Mr. Torres,    The results of your recent lab tests were overall within normal limits.  The slight change in the urine test is not concerning.  Continue present medications. Enclosed is a copy of these results.  If you have any further questions or problems, please contact our   office.     Results for orders placed or performed in visit on 11/05/19   HEMOGLOBIN A1C     Status: None   Result Value Ref Range    Hemoglobin A1C 5.3 0 - 5.6 %   Albumin Random Urine Quantitative with Creat Ratio     Status: Abnormal   Result Value Ref Range    Creatinine Urine 180 mg/dL    Albumin Urine mg/L 42 mg/L    Albumin Urine mg/g Cr 23.11 (H) 0 - 17 mg/g Cr       Sincerely,      Ishan Andrade MD/елена

## 2019-11-05 NOTE — PROGRESS NOTES
Subjective     Andrew Torres is a 69 year old male who presents to clinic today for the following health issues:    HPI     Hyperlipidemia Follow-Up      Are you having any of the following symptoms? (Select all that apply)  Pain in calves when walking 1-2 blocks    Are you regularly taking any medication or supplement to lower your cholesterol?   Yes- Lipitor    Are you having muscle aches or other side effects that you think could be caused by your cholesterol lowering medication?  No    Hypertension Follow-up      Do you check your blood pressure regularly outside of the clinic? No     Are you following a low salt diet? Yes    Are your blood pressures ever more than 140 on the top number (systolic) OR more   than 90 on the bottom number (diastolic), for example 140/90? No     Urinary Frequency  Patient notes having to go many times throughout the day. Flomax did not seem to improve symptoms.   He denies any dysuria.     BP Readings from Last 2 Encounters:   11/05/19 130/58   07/22/19 120/64     Hemoglobin A1C (%)   Date Value   11/05/2019 5.3   04/29/2019 5.3     LDL Cholesterol Calculated (mg/dL)   Date Value   04/29/2019 85   06/25/2018 69         How many servings of fruits and vegetables do you eat daily?  2-3    On average, how many sweetened beverages do you drink each day (soda, juice, sweet tea, etc)?   1  How many days per week do you miss taking your medication? 1  3.    What makes it hard for you to take your medications?  remembering to take    Patient Active Problem List   Diagnosis     CAD (coronary artery disease) stent placed 2006- life long plavix recommended     Carotid stenosis stent placed right 2010     Advanced directives, counseling/discussion     Coronary artery disease involving coronary bypass graft of native heart without angina pectoris     Gastroesophageal reflux disease without esophagitis     Essential hypertension with goal blood pressure less than 140/90     Hyperlipidemia LDL  goal <70     Uric acid kidney stone     History of CVA (cerebrovascular accident)     Left hemiparesis (H)     PAD (peripheral artery disease) (H)-      History of carotid endarterectomy     History reviewed. No pertinent surgical history.    Social History     Tobacco Use     Smoking status: Never Smoker     Smokeless tobacco: Never Used   Substance Use Topics     Alcohol use: Yes     Comment: 2-3 drinks per week     Family History   Problem Relation Age of Onset     Hypertension Mother      Hypertension Father      Heart Disease Paternal Grandmother      Cancer Sister          Current Outpatient Medications   Medication Sig Dispense Refill     amLODIPine (NORVASC) 10 MG tablet TAKE 1 TABLET BY MOUTH ONCE DAILY 90 tablet 3     ASPIRIN 81 PO        atorvastatin (LIPITOR) 10 MG tablet Take 1 tablet (10 mg) by mouth daily 90 tablet 3     carvedilol (COREG) 25 MG tablet Take 1 tablet (25 mg) by mouth 2 times daily (with meals) 180 tablet 3     clopidogrel (PLAVIX) 75 MG tablet TAKE 1 TABLET BY MOUTH ONCE DAILY 90 tablet 3     Cyanocobalamin (B-12) 1000 MCG CAPS Take  by mouth.       lisinopril (PRINIVIL/ZESTRIL) 10 MG tablet Take 1 tablet (10 mg) by mouth 2 times daily 180 tablet 3     multivitamin, therapeutic with minerals (THERA-VIT-M) TABS tablet TAKE 1 TABLET BY MOUTH ONCE DAILY 100 tablet 3     nitroglycerin (NITROSTAT) 0.4 MG SL tablet 1 tablet now 2 tablet 0     omega-3 fatty acids (FISH OIL) 1200 MG capsule Take 1 capsule by mouth daily.       order for DME Equipment being ordered: Ensure supplement 60 Can 11     tamsulosin (FLOMAX) 0.4 MG capsule Take 1 capsule (0.4 mg) by mouth daily 90 capsule 3     BP Readings from Last 3 Encounters:   11/05/19 130/58   07/22/19 120/64   04/29/19 112/70    Wt Readings from Last 3 Encounters:   11/05/19 103 kg (227 lb)   07/22/19 100.2 kg (221 lb)   04/29/19 97.8 kg (215 lb 9.6 oz)                    Reviewed and updated as needed this visit by Provider         Review of  Systems   ROS COMP: Constitutional, HEENT, cardiovascular, pulmonary, gi and gu systems are negative, except as otherwise noted.    This document serves as a record of the services and decisions personally performed and made by Ishan Andrade MD. It was created on his behalf by Nayla Graham, a trained medical scribe. The creation of this document is based the provider's statements to the medical scribe.  Nayla Graham, November 5, 2019 2:11 PM         Objective    /58 (BP Location: Right arm, Patient Position: Sitting, Cuff Size: Adult Large)   Pulse 50   Temp 97.6  F (36.4  C) (Oral)   Wt 103 kg (227 lb)   BMI 32.57 kg/m    Body mass index is 32.57 kg/m .  Physical Exam   GENERAL: alert, no distress and over weight  RESP: lungs clear to auscultation - no rales, rhonchi or wheezes  CV: regular rate and rhythm, normal S1 S2, no S3 or S4, no murmur, click or rub, no peripheral edema and peripheral pulses strong  PSYCH: mentation appears normal, affect normal/bright    Diagnostic Test Results:  Labs reviewed in Epic        Assessment & Plan     (I10) Essential hypertension with goal blood pressure less than 140/90  (primary encounter diagnosis)  Comment: Well controlled   Plan: Continue with current medications     (Z86.73) History of CVA (cerebrovascular accident)  Comment: stable  Plan:  Continue present medications      (E11.59) Type 2 diabetes mellitus with other circulatory complications (H)  Comment: After full review previous diagnosis will be removed from problem list  Plan: HEMOGLOBIN A1C, Albumin Random Urine         Quantitative with Creat Ratio          (R39.15) Urinary urgency  Comment: Patient with increased urinary frequency. Flomax did not improve symptoms.   Plan: UROLOGY ADULT REFERRAL        Follow up with urology for further evaluation     (E66.3) Overweight  Comment: Discussed healthy diet and exercise with patient   Plan: Patient intends to improve eating habits. Recheck at next  "visit          BMI:   Estimated body mass index is 32.57 kg/m  as calculated from the following:    Height as of 7/22/19: 1.778 m (5' 10\").    Weight as of this encounter: 103 kg (227 lb).   Weight management plan: Discussed healthy diet and exercise guidelines        Patient Instructions       Continue with healthy diet and exercise.   Limit carb intake.     Schedule appointment with urology to address concerns.     Orders Placed This Encounter     HEMOGLOBIN A1C     Last Lab Result: Hemoglobin A1C (%)       Date                     Value                 04/29/2019               5.3              ----------     Albumin Random Urine Quantitative with Creat Ratio     This test includes a Creatinine Ratio.  Do not order [id number] (Creatinine Random Urine)  Last Lab Result: Albumin Urine mg/g Cr (mg/g Cr)       Date                     Value                 10/29/2018               9.56             ----------  Creatinine Urine (mg/dL)       Date                     Value                 10/29/2018               136              ----------     Order Specific Question:   Includes     Answer:   with Creat Ratio     UROLOGY ADULT REFERRAL     Referral Priority:   Routine     Referral Type:   Consultation     Number of Visits Requested:   1               Return in about 6 months (around 5/5/2020) for BP Recheck.    The information in this document, created by the medical scribe for me, accurately reflects the services I personally performed and the decisions made by me. I have reviewed and approved this document for accuracy.     Ishan Andrade MD, MD  [facility]    "

## 2019-11-06 LAB
CREAT UR-MCNC: 180 MG/DL
MICROALBUMIN UR-MCNC: 42 MG/L
MICROALBUMIN/CREAT UR: 23.11 MG/G CR (ref 0–17)

## 2019-12-06 ENCOUNTER — OFFICE VISIT (OUTPATIENT)
Dept: UROLOGY | Facility: CLINIC | Age: 69
End: 2019-12-06
Payer: COMMERCIAL

## 2019-12-06 VITALS — SYSTOLIC BLOOD PRESSURE: 163 MMHG | HEART RATE: 58 BPM | RESPIRATION RATE: 20 BRPM | DIASTOLIC BLOOD PRESSURE: 84 MMHG

## 2019-12-06 DIAGNOSIS — I10 ESSENTIAL HYPERTENSION WITH GOAL BLOOD PRESSURE LESS THAN 140/90: ICD-10-CM

## 2019-12-06 DIAGNOSIS — N40.1 BENIGN PROSTATIC HYPERPLASIA WITH LOWER URINARY TRACT SYMPTOMS, SYMPTOM DETAILS UNSPECIFIED: ICD-10-CM

## 2019-12-06 DIAGNOSIS — R35.0 URINARY FREQUENCY: Primary | ICD-10-CM

## 2019-12-06 LAB
ALBUMIN UR-MCNC: NEGATIVE MG/DL
APPEARANCE UR: CLEAR
BILIRUB UR QL STRIP: NEGATIVE
COLOR UR AUTO: YELLOW
GLUCOSE UR STRIP-MCNC: NEGATIVE MG/DL
HGB UR QL STRIP: ABNORMAL
KETONES UR STRIP-MCNC: NEGATIVE MG/DL
LEUKOCYTE ESTERASE UR QL STRIP: NEGATIVE
NITRATE UR QL: NEGATIVE
PH UR STRIP: 7 PH (ref 5–7)
RBC #/AREA URNS AUTO: ABNORMAL /HPF
SOURCE: ABNORMAL
SP GR UR STRIP: 1.02 (ref 1–1.03)
UROBILINOGEN UR STRIP-ACNC: 0.2 EU/DL (ref 0.2–1)
WBC #/AREA URNS AUTO: ABNORMAL /HPF

## 2019-12-06 PROCEDURE — 51798 US URINE CAPACITY MEASURE: CPT | Performed by: UROLOGY

## 2019-12-06 PROCEDURE — 81001 URINALYSIS AUTO W/SCOPE: CPT | Performed by: UROLOGY

## 2019-12-06 PROCEDURE — 99204 OFFICE O/P NEW MOD 45 MIN: CPT | Mod: 25 | Performed by: UROLOGY

## 2019-12-06 RX ORDER — OXYBUTYNIN CHLORIDE 10 MG/1
10 TABLET, EXTENDED RELEASE ORAL AT BEDTIME
Qty: 30 TABLET | Refills: 1 | Status: SHIPPED | OUTPATIENT
Start: 2019-12-06 | End: 2020-01-06

## 2019-12-06 NOTE — PROGRESS NOTES
S: Andrew Torres is a pleasant  69 year old male who was requested to be seen by  Ishan Andrade for a consult with regard to patient's urinary complaints.  Patient complains of Urinary incontinence, Urgency and Frequency.  He has no history of elevated PSA.  Symptoms have been on going for   2 years(s).  Seems to be worsened over time.  His recent PSA was found to be   PSA   Date Value Ref Range Status   05/16/2018 0.69 0 - 4 ug/L Final     Comment:     Assay Method:  Chemiluminescence using Siemens Vista analyzer   .  His AUA Symptom Score:  9.  His QOL score:  5.  He has tried flomax without any reliefs.  He has no obstructive voiding symptoms.    Current Outpatient Medications   Medication Sig Dispense Refill     amLODIPine (NORVASC) 10 MG tablet TAKE 1 TABLET BY MOUTH ONCE DAILY 90 tablet 3     ASPIRIN 81 PO        atorvastatin (LIPITOR) 10 MG tablet Take 1 tablet (10 mg) by mouth daily 90 tablet 3     carvedilol (COREG) 25 MG tablet Take 1 tablet (25 mg) by mouth 2 times daily (with meals) 180 tablet 3     clopidogrel (PLAVIX) 75 MG tablet TAKE 1 TABLET BY MOUTH ONCE DAILY 90 tablet 3     Cyanocobalamin (B-12) 1000 MCG CAPS Take  by mouth.       lisinopril (PRINIVIL/ZESTRIL) 10 MG tablet Take 1 tablet (10 mg) by mouth 2 times daily 180 tablet 3     multivitamin, therapeutic with minerals (THERA-VIT-M) TABS tablet TAKE 1 TABLET BY MOUTH ONCE DAILY 100 tablet 3     nitroglycerin (NITROSTAT) 0.4 MG SL tablet 1 tablet now 2 tablet 0     omega-3 fatty acids (FISH OIL) 1200 MG capsule Take 1 capsule by mouth daily.       order for DME Equipment being ordered: Ensure supplement 60 Can 11     oxybutynin ER (DITROPAN-XL) 10 MG 24 hr tablet Take 1 tablet (10 mg) by mouth At Bedtime 30 tablet 1     tamsulosin (FLOMAX) 0.4 MG capsule Take 1 capsule (0.4 mg) by mouth daily 90 capsule 3     Allergies   Allergen Reactions     Hmg-Coa-R Inhibitors Muscle Pain (Myalgia)     Sulfa Drugs      No past medical history on  file.  No past surgical history on file.   Family History   Problem Relation Age of Onset     Hypertension Mother      Hypertension Father      Heart Disease Paternal Grandmother      Cancer Sister      He does not have a family history of prostate cancer.  Social History     Socioeconomic History     Marital status:      Spouse name: None     Number of children: None     Years of education: None     Highest education level: None   Occupational History     None   Social Needs     Financial resource strain: None     Food insecurity:     Worry: None     Inability: None     Transportation needs:     Medical: None     Non-medical: None   Tobacco Use     Smoking status: Never Smoker     Smokeless tobacco: Never Used   Substance and Sexual Activity     Alcohol use: Yes     Comment: 2-3 drinks per week     Drug use: No     Sexual activity: Yes     Partners: Female   Lifestyle     Physical activity:     Days per week: None     Minutes per session: None     Stress: None   Relationships     Social connections:     Talks on phone: None     Gets together: None     Attends Cheondoism service: None     Active member of club or organization: None     Attends meetings of clubs or organizations: None     Relationship status: None     Intimate partner violence:     Fear of current or ex partner: None     Emotionally abused: None     Physically abused: None     Forced sexual activity: None   Other Topics Concern     Parent/sibling w/ CABG, MI or angioplasty before 65F 55M? No   Social History Narrative     None        REVIEW OF SYSTEMS  =================  C: NEGATIVE for fever, chills, change in weight  I: NEGATIVE for worrisome rashes, moles or lesions  E/M: NEGATIVE for ear, mouth and throat problems  R: NEGATIVE for significant cough or SHORTNESS OF BREATH  CV:  NEGATIVE for chest pain, palpitations or peripheral edema  GI: NEGATIVE for nausea, abdominal pain, heartburn, or change in bowel habits  NEURO: NEGATIVE  numbness/weakness  : see HPI  PSYCH: NEGATIVE depression/anxiety  LYmph: no new enlarged lymph nodes  Ortho: no new trauma/movements           O: Exam:BP (!) 163/84   Pulse 58   Resp 20    Constitutional: healthy, alert and no distress  Cardiovascular: negative, PMI normal.   Respiratory: negative, no evidence of respiratory distress  Gastrointestinal: Abdomen soft, non-tender. BS normal. No masses, organomegaly  : penis no discharge.  Testis no masses.  No scrotal skin lesion.  Prostate apex only.  Bladder scan 30 ml.  Musculoskeletal: extremities normal- no gross deformities noted, gait normal and normal muscle tone  Skin: no suspicious lesions or rashes  Neurologic: Alert and oriented  Psychiatric: mentation appears normal. and affect normal/bright  Hematologic/Lymphatic/Immunologic: normal ant/post cervical, axillary, supraclavicular and inguinal nodes    Assessment/Plan:   (R35.0) Urinary frequency  (primary encounter diagnosis)  Comment:  H/o CVA in the past  Plan:  Trial of ditropan            Side effects discussed            See in one month for re-exam.    (N40.1) Benign prostatic hyperplasia with lower urinary tract symptoms, symptom details unspecified  Comment:    Plan: prn      HTN: Bhavin to follow up with Primary Care provider regarding elevated blood pressure.

## 2020-01-06 ENCOUNTER — OFFICE VISIT (OUTPATIENT)
Dept: UROLOGY | Facility: CLINIC | Age: 70
End: 2020-01-06
Payer: COMMERCIAL

## 2020-01-06 VITALS — SYSTOLIC BLOOD PRESSURE: 175 MMHG | HEART RATE: 62 BPM | DIASTOLIC BLOOD PRESSURE: 95 MMHG | OXYGEN SATURATION: 97 %

## 2020-01-06 DIAGNOSIS — I10 ESSENTIAL HYPERTENSION WITH GOAL BLOOD PRESSURE LESS THAN 140/90: ICD-10-CM

## 2020-01-06 DIAGNOSIS — N40.1 BENIGN PROSTATIC HYPERPLASIA WITH LOWER URINARY TRACT SYMPTOMS, SYMPTOM DETAILS UNSPECIFIED: Primary | ICD-10-CM

## 2020-01-06 DIAGNOSIS — R35.0 URINARY FREQUENCY: ICD-10-CM

## 2020-01-06 PROCEDURE — 51798 US URINE CAPACITY MEASURE: CPT | Performed by: UROLOGY

## 2020-01-06 PROCEDURE — 99214 OFFICE O/P EST MOD 30 MIN: CPT | Mod: 25 | Performed by: UROLOGY

## 2020-01-06 RX ORDER — OXYBUTYNIN CHLORIDE 10 MG/1
10 TABLET, EXTENDED RELEASE ORAL AT BEDTIME
Qty: 90 TABLET | Refills: 3 | Status: SHIPPED | OUTPATIENT
Start: 2020-01-06 | End: 2021-10-20

## 2020-01-06 RX ORDER — MIRABEGRON 50 MG/1
50 TABLET, EXTENDED RELEASE ORAL DAILY
Qty: 30 TABLET | Refills: 1 | Status: SHIPPED | OUTPATIENT
Start: 2020-01-06 | End: 2021-01-08

## 2020-01-06 NOTE — PATIENT INSTRUCTIONS
Please call our office if you have questions or need to report any information or to schedule a test/appointment, [phone].

## 2020-01-06 NOTE — PROGRESS NOTES
Chief Complaint   Patient presents with     RECHECK     med check       Andrew Torres is a 69 year old male who presents today for follow up of   Chief Complaint   Patient presents with     RECHECK     med check    f/u for benign prostatic hyperplasia/OAB.  He is on flomax and started on ditropan recently which did improve his symptoms some.  He uses 1-2 pads daily.  He voids q hourly but no nocturia.  He has urgency with some urgency incontinence.  He has no dysuria or hematuria.      Current Outpatient Medications   Medication Sig Dispense Refill     amLODIPine (NORVASC) 10 MG tablet TAKE 1 TABLET BY MOUTH ONCE DAILY 90 tablet 3     ASPIRIN 81 PO        atorvastatin (LIPITOR) 10 MG tablet Take 1 tablet (10 mg) by mouth daily 90 tablet 3     carvedilol (COREG) 25 MG tablet Take 1 tablet (25 mg) by mouth 2 times daily (with meals) 180 tablet 3     clopidogrel (PLAVIX) 75 MG tablet TAKE 1 TABLET BY MOUTH ONCE DAILY 90 tablet 3     Cyanocobalamin (B-12) 1000 MCG CAPS Take  by mouth.       lisinopril (PRINIVIL/ZESTRIL) 10 MG tablet Take 1 tablet (10 mg) by mouth 2 times daily 180 tablet 3     multivitamin, therapeutic with minerals (THERA-VIT-M) TABS tablet TAKE 1 TABLET BY MOUTH ONCE DAILY 100 tablet 3     nitroglycerin (NITROSTAT) 0.4 MG SL tablet 1 tablet now 2 tablet 0     omega-3 fatty acids (FISH OIL) 1200 MG capsule Take 1 capsule by mouth daily.       order for DME Equipment being ordered: Ensure supplement 60 Can 11     oxybutynin ER (DITROPAN-XL) 10 MG 24 hr tablet Take 1 tablet (10 mg) by mouth At Bedtime 90 tablet 3     tamsulosin (FLOMAX) 0.4 MG capsule Take 1 capsule (0.4 mg) by mouth daily 90 capsule 3     Allergies   Allergen Reactions     Hmg-Coa-R Inhibitors Muscle Pain (Myalgia)     Sulfa Drugs       No past medical history on file.  No past surgical history on file.  Family History   Problem Relation Age of Onset     Hypertension Mother      Hypertension Father      Heart Disease Paternal  Grandmother      Cancer Sister      Social History     Socioeconomic History     Marital status:      Spouse name: None     Number of children: None     Years of education: None     Highest education level: None   Occupational History     None   Social Needs     Financial resource strain: None     Food insecurity:     Worry: None     Inability: None     Transportation needs:     Medical: None     Non-medical: None   Tobacco Use     Smoking status: Former Smoker     Smokeless tobacco: Never Used   Substance and Sexual Activity     Alcohol use: Yes     Comment: 2-3 drinks per week     Drug use: No     Sexual activity: Yes     Partners: Female   Lifestyle     Physical activity:     Days per week: None     Minutes per session: None     Stress: None   Relationships     Social connections:     Talks on phone: None     Gets together: None     Attends Buddhist service: None     Active member of club or organization: None     Attends meetings of clubs or organizations: None     Relationship status: None     Intimate partner violence:     Fear of current or ex partner: None     Emotionally abused: None     Physically abused: None     Forced sexual activity: None   Other Topics Concern     Parent/sibling w/ CABG, MI or angioplasty before 65F 55M? No   Social History Narrative     None       REVIEW OF SYSTEMS  =================  C: NEGATIVE for fever, chills, change in weight  I: NEGATIVE for worrisome rashes, moles or lesions  E/M: NEGATIVE for ear, mouth and throat problems  R: NEGATIVE for significant cough or SHORTNESS OF BREATH  CV:  NEGATIVE for chest pain, palpitations or peripheral edema  GI: NEGATIVE for nausea, abdominal pain, heartburn, or change in bowel habits  NEURO: NEGATIVE numbness/weakness  : see HPI  PSYCH: NEGATIVE depression/anxiety  LYmph: no new enlarged lymph nodes  Ortho: no new trauma/movements    Physical Exam:  BP (!) 175/95 (BP Location: Left arm, Patient Position: Sitting, Cuff Size:  Adult Large)   Pulse 62   SpO2 97%    Patient is pleasant, in no acute distress, good general condition.  Lung: no evidence of respiratory distress    Abdomen: Soft, nondistended, non tender. No masses. No rebound or guarding.   Exam: no cva tenderness or bladder distension.  Skin: Warm and dry.  No redness.  Psych: normal mood and affect  Neuro: alert and oriented  Musculaskeletal: moving all extremities    Assessment/Plan:   (N40.1) Benign prostatic hyperplasia with lower urinary tract symptoms, symptom details unspecified  (primary encounter diagnosis)  Comment:    Plan: MEASURE POST-VOID RESIDUAL URINE/BLADDER         CAPACITY, US NON-IMAGING (38307)        Cont with flomax    (R35.0) Urinary frequency  Comment: responded some to ditropan  Plan: oxybutynin ER (DITROPAN-XL) 10 MG 24 hr tablet           Add myrbetriq        RTC for uroflow/cysto if not better.    (I10) Essential hypertension with goal blood pressure less than 140/90  Comment:    Plan: Bhavin to follow up with Primary Care provider regarding elevated blood pressure.

## 2020-01-06 NOTE — PROGRESS NOTES
Bladder Scan performed. 0ml maximum residual urine detected after 3 scans. MD informed.    Keesha Concepcion, RN Specialty Triage 1/6/2020 11:35 AM

## 2020-01-20 ENCOUNTER — TELEPHONE (OUTPATIENT)
Dept: FAMILY MEDICINE | Facility: CLINIC | Age: 70
End: 2020-01-20

## 2020-01-20 NOTE — LETTER
January 20, 2020      Andrew Torres  [address] [address]        Dear Andrew,    We care about your health and have reviewed your health plan. We have reviewed your medical conditions, medication list, and lab results and are making recommendations based on this review, to better manage your health.    You are in particular need of attention regarding:  - Scheduling an Annual Physical / Wellness Visit with your primary care provider    Here is a list of Health Maintenance topics that are due now or due soon:  Health Maintenance Due   Topic Date Due     ZOSTER IMMUNIZATION (1 of 2) 02/23/1998     MEDICARE ANNUAL WELLNESS VISIT  02/23/2013     PNEUMOCOCCAL IMMUNIZATION 65+ LOW/MEDIUM RISK (1 of 2 - PCV13) 02/23/2013     EYE EXAM  08/03/2016     DTAP/TDAP/TD IMMUNIZATION (2 - Td) 05/06/2019     INFLUENZA VACCINE (1) 09/01/2019     DIABETIC FOOT EXAM  10/29/2019     TSH W/FREE T4 REFLEX  11/15/2019     PHQ-2  01/01/2020       Please call us at [phone] (or use [employer]) to address the above recommendations.     Thank you for trusting [facility] with your healthcare needs. We appreciate the opportunity to serve you and look forward to supporting you in the future.    Healthy Regards,    Your Care Team

## 2020-01-20 NOTE — TELEPHONE ENCOUNTER
Panel Management Review      Patient has the following on his problem list:     Hypertension   Last three blood pressure readings:  BP Readings from Last 3 Encounters:   01/06/20 (!) 175/95   12/06/19 (!) 163/84   11/05/19 130/58     Blood pressure: FAILED    HTN Guidelines:  Less than 140/90      Composite cancer screening  Chart review shows that this patient is due/due soon for the following None  Summary:    Patient is due/failing the following:   PHYSICAL    Action needed:   Patient needs office visit for Physical.    Type of outreach:    Sent letter.    Questions for provider review:    None                                                                                                                                    Gabbie Aguayo CMA      Chart routed to N/A.

## 2020-08-24 ENCOUNTER — TELEPHONE (OUTPATIENT)
Dept: FAMILY MEDICINE | Facility: CLINIC | Age: 70
End: 2020-08-24

## 2020-08-24 NOTE — TELEPHONE ENCOUNTER
Panel Management Review      Patient has the following on his problem list:     Diabetes    ASA: Passed    Last A1C  Lab Results   Component Value Date    A1C 5.3 11/05/2019    A1C 5.3 04/29/2019    A1C 5.0 10/29/2018    A1C 5.3 06/25/2018    A1C 5.7 03/21/2018     A1C tested: Passed    Last LDL:    Lab Results   Component Value Date    CHOL 160 04/29/2019     Lab Results   Component Value Date    HDL 43 04/29/2019     Lab Results   Component Value Date    LDL 85 04/29/2019     Lab Results   Component Value Date    TRIG 160 04/29/2019     Lab Results   Component Value Date    CHOLHDLRATIO 2.5 08/18/2014     Lab Results   Component Value Date    NHDL 117 04/29/2019       Is the patient on a Statin? YES             Is the patient on Aspirin? YES    Medications     HMG CoA Reductase Inhibitors     atorvastatin (LIPITOR) 10 MG tablet       Salicylates     ASPIRIN 81 PO             Last three blood pressure readings:  BP Readings from Last 3 Encounters:   01/06/20 (!) 175/95   12/06/19 (!) 163/84   11/05/19 130/58       Date of last diabetes office visit: 11/5/2019     Tobacco History:     History   Smoking Status     Former Smoker   Smokeless Tobacco     Never Used         Hypertension   Last three blood pressure readings:  BP Readings from Last 3 Encounters:   01/06/20 (!) 175/95   12/06/19 (!) 163/84   11/05/19 130/58     Blood pressure: FAILED    HTN Guidelines:  Less than 140/90      Composite cancer screening  Chart review shows that this patient is due/due soon for the following None  Summary:    Patient is due/failing the following:   A1C, BP CHECK and PHYSICAL    Action needed:   Patient needs office visit for Physical, A1C, Isaac check.    Type of outreach:    Sent letter.    Questions for provider review:    None                                                                                                                                    Gabbie Aguayo CMA      Chart routed to N/A.

## 2020-08-24 NOTE — LETTER
August 24, 2020      Andrew Torres  [address] [address]      Dear Andrew,    We care about your health and have reviewed your health plan. We have reviewed your medical conditions, medication list, and lab results and are making recommendations based on this review, to better manage your health.    You are in particular need of attention regarding:  - Diabetes Care   - High Blood Pressure - please call to schedule a follow up with your provider or a BP check only appointment on the ancillary schedule. Or you may stop by our pharmacy for a blood pressure check.  - Scheduling an Annual Physical / Wellness Visit with your primary care provider    Here is a list of Health Maintenance topics that are due now or due soon:  Health Maintenance Due   Topic Date Due     HEPATITIS B IMMUNIZATION (1 of 3 - Risk 3-dose series) 02/23/1967     ZOSTER IMMUNIZATION (1 of 2) 02/23/1998     MEDICARE ANNUAL WELLNESS VISIT  02/23/2013     PNEUMOCOCCAL IMMUNIZATION 65+ LOW/MEDIUM RISK (1 of 2 - PCV13) 02/23/2013     EYE EXAM  08/03/2016     DTAP/TDAP/TD IMMUNIZATION (2 - Td) 05/06/2019     DIABETIC FOOT EXAM  10/29/2019     PHQ-2  01/01/2020     BMP  04/29/2020     LIPID  04/29/2020     FALL RISK ASSESSMENT  04/29/2020     A1C  05/05/2020     ADVANCE CARE PLANNING  07/16/2020     Please call us at [phone] (or use [employer]) to address the above recommendations.   Thank you for trusting [facility] with your healthcare needs. We appreciate the opportunity to serve you and look forward to supporting you in the future.    Healthy Regards,  Your Care Team

## 2020-08-31 DIAGNOSIS — Z86.73 HISTORY OF CVA (CEREBROVASCULAR ACCIDENT): ICD-10-CM

## 2020-08-31 DIAGNOSIS — I25.810 CORONARY ARTERY DISEASE INVOLVING CORONARY BYPASS GRAFT OF NATIVE HEART WITHOUT ANGINA PECTORIS: ICD-10-CM

## 2020-08-31 DIAGNOSIS — I10 ESSENTIAL HYPERTENSION WITH GOAL BLOOD PRESSURE LESS THAN 140/90: ICD-10-CM

## 2020-09-01 RX ORDER — CARVEDILOL 25 MG/1
TABLET ORAL
Qty: 180 TABLET | Refills: 0 | Status: SHIPPED | OUTPATIENT
Start: 2020-09-01 | End: 2020-12-20

## 2020-09-01 NOTE — TELEPHONE ENCOUNTER
Routing refill request to provider for review/approval because:  BP Readings from Last 3 Encounters:   01/06/20 (!) 175/95   12/06/19 (!) 163/84   11/05/19 130/58       Carolina Vila RN, BSN, PHN  [facility] [place]

## 2020-12-09 DIAGNOSIS — Z86.73 HISTORY OF CVA (CEREBROVASCULAR ACCIDENT): ICD-10-CM

## 2020-12-09 DIAGNOSIS — E11.59 TYPE 2 DIABETES MELLITUS WITH OTHER CIRCULATORY COMPLICATION, WITHOUT LONG-TERM CURRENT USE OF INSULIN (H): ICD-10-CM

## 2020-12-09 DIAGNOSIS — G81.94 LEFT HEMIPARESIS (H): ICD-10-CM

## 2020-12-09 DIAGNOSIS — R63.4 LOSS OF WEIGHT: ICD-10-CM

## 2020-12-11 RX ORDER — ASPIRIN 81 MG
TABLET, DELAYED RELEASE (ENTERIC COATED) ORAL
Qty: 30 TABLET | Refills: 0 | Status: SHIPPED | OUTPATIENT
Start: 2020-12-11

## 2020-12-11 NOTE — TELEPHONE ENCOUNTER
Medication is being filled for 1 time refill only due to:  Patient needs to be seen because it has been more than one year since last visit.     Carolina Vila, RN, BSN, PHN  [facility]: [place]

## 2020-12-16 DIAGNOSIS — E78.5 HYPERLIPIDEMIA LDL GOAL <70: ICD-10-CM

## 2020-12-16 DIAGNOSIS — I25.810 CORONARY ARTERY DISEASE INVOLVING CORONARY BYPASS GRAFT OF NATIVE HEART WITHOUT ANGINA PECTORIS: ICD-10-CM

## 2020-12-16 DIAGNOSIS — I10 ESSENTIAL HYPERTENSION WITH GOAL BLOOD PRESSURE LESS THAN 140/90: ICD-10-CM

## 2020-12-16 DIAGNOSIS — Z86.73 HISTORY OF CVA (CEREBROVASCULAR ACCIDENT): ICD-10-CM

## 2020-12-18 NOTE — TELEPHONE ENCOUNTER
Routing refill request to provider for review/approval because:  Patient needs to be seen because it has been more than 1 year since last office visit.  LDL Cholesterol Calculated   Date Value Ref Range Status   04/29/2019 85 <100 mg/dL Final     Comment:     Desirable:       <100 mg/dl     BP Readings from Last 3 Encounters:   01/06/20 (!) 175/95   12/06/19 (!) 163/84   11/05/19 130/58     Carolina Vila RN, BSN, PHN  [facility]: [place]

## 2020-12-20 RX ORDER — ATORVASTATIN CALCIUM 10 MG/1
10 TABLET, FILM COATED ORAL DAILY
Qty: 90 TABLET | Refills: 0 | Status: SHIPPED | OUTPATIENT
Start: 2020-12-20 | End: 2021-01-08

## 2020-12-20 RX ORDER — CARVEDILOL 25 MG/1
25 TABLET ORAL 2 TIMES DAILY WITH MEALS
Qty: 180 TABLET | Refills: 0 | Status: SHIPPED | OUTPATIENT
Start: 2020-12-20 | End: 2021-01-08

## 2021-01-08 ENCOUNTER — OFFICE VISIT (OUTPATIENT)
Dept: FAMILY MEDICINE | Facility: CLINIC | Age: 71
End: 2021-01-08
Payer: COMMERCIAL

## 2021-01-08 VITALS
OXYGEN SATURATION: 95 % | DIASTOLIC BLOOD PRESSURE: 70 MMHG | BODY MASS INDEX: 33.43 KG/M2 | HEART RATE: 59 BPM | SYSTOLIC BLOOD PRESSURE: 124 MMHG | WEIGHT: 233 LBS

## 2021-01-08 DIAGNOSIS — N40.1 BENIGN PROSTATIC HYPERPLASIA WITH URINARY FREQUENCY: ICD-10-CM

## 2021-01-08 DIAGNOSIS — Z12.5 SCREENING FOR PROSTATE CANCER: ICD-10-CM

## 2021-01-08 DIAGNOSIS — M79.89 LEG SWELLING: ICD-10-CM

## 2021-01-08 DIAGNOSIS — E11.59 TYPE 2 DIABETES MELLITUS WITH OTHER CIRCULATORY COMPLICATION, WITHOUT LONG-TERM CURRENT USE OF INSULIN (H): ICD-10-CM

## 2021-01-08 DIAGNOSIS — R35.0 BENIGN PROSTATIC HYPERPLASIA WITH URINARY FREQUENCY: ICD-10-CM

## 2021-01-08 DIAGNOSIS — I10 ESSENTIAL HYPERTENSION WITH GOAL BLOOD PRESSURE LESS THAN 140/90: ICD-10-CM

## 2021-01-08 DIAGNOSIS — Z00.00 ENCOUNTER FOR MEDICARE ANNUAL WELLNESS EXAM: Primary | ICD-10-CM

## 2021-01-08 DIAGNOSIS — I25.810 CORONARY ARTERY DISEASE INVOLVING CORONARY BYPASS GRAFT OF NATIVE HEART WITHOUT ANGINA PECTORIS: ICD-10-CM

## 2021-01-08 DIAGNOSIS — Z79.01 ON ANTICOAGULANT THERAPY: ICD-10-CM

## 2021-01-08 DIAGNOSIS — Z86.73 HISTORY OF CVA (CEREBROVASCULAR ACCIDENT): ICD-10-CM

## 2021-01-08 DIAGNOSIS — E78.5 HYPERLIPIDEMIA LDL GOAL <70: ICD-10-CM

## 2021-01-08 LAB
BASOPHILS # BLD AUTO: 0 10E9/L (ref 0–0.2)
BASOPHILS NFR BLD AUTO: 0.1 %
CREAT UR-MCNC: 149 MG/DL
DIFFERENTIAL METHOD BLD: NORMAL
EOSINOPHIL # BLD AUTO: 0.1 10E9/L (ref 0–0.7)
EOSINOPHIL NFR BLD AUTO: 1.3 %
ERYTHROCYTE [DISTWIDTH] IN BLOOD BY AUTOMATED COUNT: 13.7 % (ref 10–15)
HBA1C MFR BLD: 6.1 % (ref 0–5.6)
HCT VFR BLD AUTO: 44.2 % (ref 40–53)
HGB BLD-MCNC: 14.6 G/DL (ref 13.3–17.7)
LYMPHOCYTES # BLD AUTO: 2.6 10E9/L (ref 0.8–5.3)
LYMPHOCYTES NFR BLD AUTO: 34.7 %
MCH RBC QN AUTO: 31 PG (ref 26.5–33)
MCHC RBC AUTO-ENTMCNC: 33 G/DL (ref 31.5–36.5)
MCV RBC AUTO: 94 FL (ref 78–100)
MICROALBUMIN UR-MCNC: 63 MG/L
MICROALBUMIN/CREAT UR: 42.28 MG/G CR (ref 0–17)
MONOCYTES # BLD AUTO: 0.7 10E9/L (ref 0–1.3)
MONOCYTES NFR BLD AUTO: 9 %
NEUTROPHILS # BLD AUTO: 4.1 10E9/L (ref 1.6–8.3)
NEUTROPHILS NFR BLD AUTO: 54.9 %
PLATELET # BLD AUTO: 183 10E9/L (ref 150–450)
RBC # BLD AUTO: 4.71 10E12/L (ref 4.4–5.9)
WBC # BLD AUTO: 7.5 10E9/L (ref 4–11)

## 2021-01-08 PROCEDURE — 80061 LIPID PANEL: CPT | Performed by: NURSE PRACTITIONER

## 2021-01-08 PROCEDURE — 83036 HEMOGLOBIN GLYCOSYLATED A1C: CPT | Performed by: NURSE PRACTITIONER

## 2021-01-08 PROCEDURE — G0103 PSA SCREENING: HCPCS | Performed by: NURSE PRACTITIONER

## 2021-01-08 PROCEDURE — 36415 COLL VENOUS BLD VENIPUNCTURE: CPT | Performed by: NURSE PRACTITIONER

## 2021-01-08 PROCEDURE — 80048 BASIC METABOLIC PNL TOTAL CA: CPT | Performed by: NURSE PRACTITIONER

## 2021-01-08 PROCEDURE — 99397 PER PM REEVAL EST PAT 65+ YR: CPT | Performed by: NURSE PRACTITIONER

## 2021-01-08 PROCEDURE — 85025 COMPLETE CBC W/AUTO DIFF WBC: CPT | Performed by: NURSE PRACTITIONER

## 2021-01-08 PROCEDURE — 82043 UR ALBUMIN QUANTITATIVE: CPT | Performed by: NURSE PRACTITIONER

## 2021-01-08 PROCEDURE — 99213 OFFICE O/P EST LOW 20 MIN: CPT | Mod: 25 | Performed by: NURSE PRACTITIONER

## 2021-01-08 RX ORDER — CARVEDILOL 25 MG/1
25 TABLET ORAL 2 TIMES DAILY WITH MEALS
Qty: 180 TABLET | Refills: 1 | Status: SHIPPED | OUTPATIENT
Start: 2021-01-08 | End: 2023-01-18

## 2021-01-08 RX ORDER — ATORVASTATIN CALCIUM 10 MG/1
10 TABLET, FILM COATED ORAL DAILY
Qty: 90 TABLET | Refills: 1 | Status: SHIPPED | OUTPATIENT
Start: 2021-01-08 | End: 2021-01-10

## 2021-01-08 NOTE — PATIENT INSTRUCTIONS
Patient Education   Personalized Prevention Plan  You are due for the preventive services outlined below.  Your care team is available to assist you in scheduling these services.  If you have already completed any of these items, please share that information with your care team to update in your medical record.  Health Maintenance Due   Topic Date Due     Zoster (Shingles) Vaccine (1 of 2) 02/23/1998     Eye Exam  08/03/2016           Recommend following up with urology regarding urinary concerns.     Recommend having life screening results sent to clinic for review. If an echocardiogram has not recently been done then I would recommend that you have one completed, please let me know.

## 2021-01-08 NOTE — PROGRESS NOTES
"  SUBJECTIVE:   Andrew Torres is a 70 year old male who presents for Preventive Visit.    Patient has been advised of split billing requirements and indicates understanding: Yes  Are you in the first 12 months of your Medicare Part B coverage?  No    Physical Health:    In general, how would you rate your overall physical health? fair    Outside of work, how many days during the week do you exercise? none walks around stores.    Outside of work, approximately how many minutes a day do you exercise?not applicable    If you drink alcohol do you typically have >3 drinks per day or >7 drinks per week? No    Do you usually eat at least 4 servings of fruit and vegetables a day, include whole grains & fiber and avoid regularly eating high fat or \"junk\" foods? Yes    Do you have any problems taking medications regularly?  No    Do you have any side effects from medications? not applicable    Needs assistance for the following daily activities: no assistance needed    Which of the following safety concerns are present in your home?  none identified     Hearing impairment: No    In the past 6 months, have you been bothered by leaking of urine? Yes    He admits to snacking too much. He lives at home with his wife. He plans on going to the dentist and eye doctor in march.     He states he continues to have urinary symptoms. He was unable to  the last prescription from urology as it was too expensive.     He states he recently had life screening performed. He states that they evaluated his carotid arteries and states they \"looked good.\" he also believes they did an echocardiogram of his heart. He had surgery on his right carotid artery a few years ago. Denies chest pain, shortness of breath, dizziness/light headedness and headaches.     Mental Health:    In general, how would you rate your overall mental or emotional health? fair  PHQ-2 Score:      Do you feel safe in your environment? Yes    Have you ever done Advance " Care Planning? (For example, a Health Directive, POLST, or a discussion with a medical provider or your loved ones about your wishes): Yes, advance care planning is on file.      Diabetes Follow-up      How often are you checking your blood sugar? No    What concerns do you have today about your diabetes? NA     Do you have any of these symptoms? (Select all that apply)  No numbness or tingling in feet.  No redness, sores or blisters on feet.  No complaints of excessive thirst.  No reports of blurry vision.  No significant changes to weight.    Have you had a diabetic eye exam in the last 12 months? No over due                Hyperlipidemia Follow-Up      Are you regularly taking any medication or supplement to lower your cholesterol?   Yes- Atorvastatin 10mg    Are you having muscle aches or other side effects that you think could be caused by your cholesterol lowering medication?  No    Hypertension Follow-up      Do you check your blood pressure regularly outside of the clinic?  has cuff machine but has hard time getting it to work.    Are you following a low salt diet? No    Are your blood pressures ever more than 140 on the top number (systolic) OR more   than 90 on the bottom number (diastolic), for example 140/90?     BP Readings from Last 2 Encounters:   21 124/70   20 (!) 175/95     Hemoglobin A1C (%)   Date Value   2021 6.1 (H)   2019 5.3     LDL Cholesterol Calculated (mg/dL)   Date Value   2021 111 (H)   2019 85       Fall risk:  Fallen 2 or more times in the past year?: No  Any fall with injury in the past year?: No    Cognitive Screenin) Repeat 3 items (Leader, Season, Table)    2) Clock draw: NORMAL  3) 3 item recall: Recalls 1 object   Results: NORMAL clock, 1-2 items recalled: COGNITIVE IMPAIRMENT LESS LIKELY    Mini-CogTM Copyright SID Sellers. Licensed by the author for use in [employer]; reprinted with permission ([email or web address]). All rights  reserved.      Do you have sleep apnea, excessive snoring or daytime drowsiness?: no      Reviewed and updated as needed this visit by clinical staff  Tobacco  Allergies  Meds   Med Hx  Surg Hx  Fam Hx  Soc Hx        Reviewed and updated as needed this visit by Provider                Social History     Tobacco Use     Smoking status: Former Smoker     Smokeless tobacco: Never Used     Tobacco comment: Quit in the 80's 86?   Substance Use Topics     Alcohol use: Yes     Comment: 2-3 drinks per week                           Current providers sharing in care for this patient include:  Patient Care Team:  Ishan Andrade MD as PCP - General (Family Practice)  Ishan Andrade MD as Assigned PCP  Bravo Washington MD as MD (Cardiology)  Nate Mejia MD as MD (Surgery)  Anurag Woodard MD as Assigned Surgical Provider    The following health maintenance items are reviewed in Epic and correct as of today:  Health Maintenance   Topic Date Due     ZOSTER IMMUNIZATION (1 of 2) 02/23/1998     EYE EXAM  08/03/2016     INFLUENZA VACCINE (1) 01/27/2021 (Originally 9/1/2020)     Pneumococcal Vaccine: 65+ Years (1 of 1 - PPSV23) 01/27/2021 (Originally 5/6/2014)     DTAP/TDAP/TD IMMUNIZATION (3 - Td) 01/27/2021 (Originally 5/6/2019)     A1C  07/08/2021     MEDICARE ANNUAL WELLNESS VISIT  01/08/2022     BMP  01/08/2022     LIPID  01/08/2022     MICROALBUMIN  01/08/2022     DIABETIC FOOT EXAM  01/08/2022     FALL RISK ASSESSMENT  01/08/2022     ADVANCE CARE PLANNING  01/08/2026     COLORECTAL CANCER SCREENING  01/12/2027     HEPATITIS C SCREENING  Completed     PHQ-2  Completed     AORTIC ANEURYSM SCREENING (SYSTEM ASSIGNED)  Completed     Pneumococcal Vaccine: Pediatrics (0 to 5 Years) and At-Risk Patients (6 to 64 Years)  Aged Out     IPV IMMUNIZATION  Aged Out     MENINGITIS IMMUNIZATION  Aged Out       ROS:  Constitutional, HEENT, cardiovascular, pulmonary, GI, musculoskeletal, neuro, skin,  "endocrine and psych systems are negative, except urinary frequency     OBJECTIVE:   /70 (Patient Position: Sitting, Cuff Size: Adult Regular)   Pulse 59   Wt 105.7 kg (233 lb)   SpO2 95%   BMI 33.43 kg/m   Estimated body mass index is 33.43 kg/m  as calculated from the following:    Height as of 7/22/19: 1.778 m (5' 10\").    Weight as of this encounter: 105.7 kg (233 lb).  EXAM:   GENERAL: healthy, alert and no distress  EYES: Eyes grossly normal to inspection, PERRL and conjunctivae and sclerae normal, ptosis noted to bilat upper eyelids   HENT: ear canals and TM's normal, nose and mouth without ulcers or lesions  NECK: no adenopathy, no asymmetry, masses, or scars and thyroid normal to palpation  RESP: lungs clear to auscultation - no rales, rhonchi or wheezes  CV: regular rate and rhythm, normal S1 S2, no S3 or S4, no murmur, click or rub, peripheral pulses strong, +2 edema noted to bilat lower extremities without erythema    ABDOMEN: soft, nontender, no hepatosplenomegaly, no masses and bowel sounds normal. Umbilical hernia noted without signs of obstruction/gangrene (pt reports this is chronic and he denies pain to this area)   MS: no gross musculoskeletal defects noted, no edema  SKIN: no suspicious lesions or rashes  FOOT: normal monofilament test noted to bilat feet. Thickening noted to toenails bilat   NEURO: Normal strength and tone, mentation intact and speech normal  PSYCH: mentation appears normal, affect normal/bright    ASSESSMENT / PLAN:   1. Encounter for Medicare annual wellness exam  He declined vaccines today     2. Type 2 diabetes mellitus with other circulatory complication, without long-term current use of insulin (H)  Will check labs and adjust medications if needed. He is on a statin as well as ACE inhibitor and anticoagulant  - BASIC METABOLIC PANEL  - Lipid panel reflex to direct LDL Fasting  - HEMOGLOBIN A1C  - Albumin Random Urine Quantitative with Creat Ratio  - OFFICE/OUTPT " VISIT,EST,LEVL III    3. Essential hypertension with goal blood pressure less than 140/90  Well controlled. Medication refilled today   - carvedilol (COREG) 25 MG tablet; Take 1 tablet (25 mg) by mouth 2 times daily (with meals)  Dispense: 180 tablet; Refill: 1  - OFFICE/OUTPT VISIT,EST,LEVL III    4. Benign prostatic hyperplasia with urinary frequency  Encouraged pt to follow up with urology regarding continued urinary symptoms   - OFFICE/OUTPT VISIT,EST,LEVL III    5. Screening for prostate cancer  - PSA, screen    6. Coronary artery disease involving coronary bypass graft of native heart without angina pectoris  Medications refilled. It does not appear that pt is following with cardiology anymore however he reports recently having life screening performed that included possible carotid ultrasound and echocardiogram per report. Currently denies symptoms   - atorvastatin (LIPITOR) 10 MG tablet; Take 1 tablet (10 mg) by mouth daily  Dispense: 90 tablet; Refill: 1  - carvedilol (COREG) 25 MG tablet; Take 1 tablet (25 mg) by mouth 2 times daily (with meals)  Dispense: 180 tablet; Refill: 1  - OFFICE/OUTPT VISIT,EST,LEVL III    7. Hyperlipidemia LDL goal <70  Will check lipid panel and adjust medication accordingly   - atorvastatin (LIPITOR) 10 MG tablet; Take 1 tablet (10 mg) by mouth daily  Dispense: 90 tablet; Refill: 1  - OFFICE/OUTPT VISIT,EST,LEVL III    8. History of CVA (cerebrovascular accident)  Medications refilled today   - carvedilol (COREG) 25 MG tablet; Take 1 tablet (25 mg) by mouth 2 times daily (with meals)  Dispense: 180 tablet; Refill: 1  - OFFICE/OUTPT VISIT,EST,LEVL III    9. On anticoagulant therapy  No signs of acute blood loss at this time. Will check CBC   - CBC with platelets and differential  - OFFICE/OUTPT VISIT,EST,LEVL III    10. Leg swelling  It appears he has previously been on lasix for leg swelling. He denies recent injury. Leg swelling is bilateral. Encouraged pt to have life  "screening results sent to clinic for review, if echocardiogram was not performed then would order this to further assess. May add lasix as well pending labs. Encouraged elevation of legs when able.       Patient has been advised of split billing requirements and indicates understanding: not discussed     COUNSELING:  Reviewed preventive health counseling, as reflected in patient instructions    Estimated body mass index is 33.43 kg/m  as calculated from the following:    Height as of 7/22/19: 1.778 m (5' 10\").    Weight as of this encounter: 105.7 kg (233 lb).    Weight management plan: Discussed healthy diet and exercise guidelines    He reports that he has quit smoking. He has never used smokeless tobacco.    Appropriate preventive services were discussed with this patient, including applicable screening as appropriate for cardiovascular disease, diabetes, osteopenia/osteoporosis, and glaucoma.  As appropriate for age/gender, discussed screening for colorectal cancer, prostate cancer, breast cancer, and cervical cancer. Checklist reviewing preventive services available has been given to the patient.    Reviewed patients plan of care and provided an AVS. The Intermediate Care Plan for Andrew meets the Care Plan requirement. This Care Plan has been established and reviewed with the Patient.    Counseling Resources:  ATP IV Guidelines  Pooled Cohorts Equation Calculator  Breast Cancer Risk Calculator  BRCA-Related Cancer Risk Assessment: FHS-7 Tool  FRAX Risk Assessment  ICSI Preventive Guidelines  Dietary Guidelines for Americans, 2010  USDA's MyPlate  ASA Prophylaxis  Lung CA Screening    Mayelin Stokes NP  [facility]  "

## 2021-01-09 LAB
ANION GAP SERPL CALCULATED.3IONS-SCNC: 5 MMOL/L (ref 3–14)
BUN SERPL-MCNC: 19 MG/DL (ref 7–30)
CALCIUM SERPL-MCNC: 9.3 MG/DL (ref 8.5–10.1)
CHLORIDE SERPL-SCNC: 109 MMOL/L (ref 94–109)
CHOLEST SERPL-MCNC: 184 MG/DL
CO2 SERPL-SCNC: 27 MMOL/L (ref 20–32)
CREAT SERPL-MCNC: 1.07 MG/DL (ref 0.66–1.25)
GFR SERPL CREATININE-BSD FRML MDRD: 69 ML/MIN/{1.73_M2}
GLUCOSE SERPL-MCNC: 153 MG/DL (ref 70–99)
HDLC SERPL-MCNC: 39 MG/DL
LDLC SERPL CALC-MCNC: 111 MG/DL
NONHDLC SERPL-MCNC: 145 MG/DL
POTASSIUM SERPL-SCNC: 3.8 MMOL/L (ref 3.4–5.3)
PSA SERPL-ACNC: 0.86 UG/L (ref 0–4)
SODIUM SERPL-SCNC: 141 MMOL/L (ref 133–144)
TRIGL SERPL-MCNC: 169 MG/DL

## 2021-01-10 ENCOUNTER — TELEPHONE (OUTPATIENT)
Dept: FAMILY MEDICINE | Facility: CLINIC | Age: 71
End: 2021-01-10

## 2021-01-10 DIAGNOSIS — M79.89 LEG SWELLING: Primary | ICD-10-CM

## 2021-01-10 DIAGNOSIS — E78.5 HYPERLIPIDEMIA LDL GOAL <70: ICD-10-CM

## 2021-01-10 PROBLEM — D23.9 BENIGN NEOPLASM OF SKIN: Status: ACTIVE | Noted: 2021-01-10

## 2021-01-10 PROBLEM — I65.29 CAROTID ARTERY OCCLUSION: Status: ACTIVE | Noted: 2021-01-10

## 2021-01-10 PROBLEM — Z87.891 FORMER LIGHT TOBACCO SMOKER: Status: ACTIVE | Noted: 2021-01-10

## 2021-01-10 PROBLEM — K64.9 HEMORRHOIDS WITHOUT COMPLICATION: Status: ACTIVE | Noted: 2021-01-10

## 2021-01-10 PROBLEM — H93.19 TINNITUS: Status: ACTIVE | Noted: 2021-01-10

## 2021-01-10 RX ORDER — ATORVASTATIN CALCIUM 20 MG/1
20 TABLET, FILM COATED ORAL DAILY
Qty: 90 TABLET | Refills: 1 | Status: SHIPPED | OUTPATIENT
Start: 2021-01-10 | End: 2022-01-14 | Stop reason: DRUGHIGH

## 2021-01-10 NOTE — LETTER
January 20, 2021      Andrew Torres  [address] [address]        Dear Andrew,     We were unable to reach you over the phone. This letter is to inform you of your recent lab results.   A1C slightly elevated at 6.1 which is in the prediabetes range. Encourage low carb diet and increased activity as able. Microalbuminuria noted. Glucose elevated at 153. Cholesterol levels have worsened since 2019. Labs otherwise normal.   Recommending increasing lipitor to 20mg by mouth daily, re-check fasting lipid panel in 2-3 months.   Please bring your life screening documentation records to the clinic for review.   Recommending compression stockings to be worn during the day and off at night for your leg swelling.      Sincerely,        Mayelin Stokes NP

## 2021-01-10 NOTE — TELEPHONE ENCOUNTER
Please call pt: A1C slightly elevated at 6.1 which is in the prediabetes range. Encourage low carb diet and increased activity as able. Microalbuminuria noted, pt is already on ACE inhibitor. Glucose elevated at 153. Cholesterol levels have worsened since 2019. Labs otherwise normal. Recommending increasing lipitor to 20mg PO daily, re-check fasting lipid panel in 2-3 months. Please found out if he has found his life screening documentation and remind him to bring these records to the clinic for review. Recommending compression stockings to be worn during the day and off at night for his leg swelling.

## 2021-01-11 NOTE — TELEPHONE ENCOUNTER
Called and left message to return call to [facility].    Results below.    Elisabet Aranda RN

## 2021-01-14 NOTE — TELEPHONE ENCOUNTER
Attempt #2 to call patient.     Patient did not answer, RN left voicemail at home number. RN requested patient return call to [facility] at [phone].     Carolina Vila RN, BSN, PHN  [facility]: [place]

## 2021-01-18 NOTE — TELEPHONE ENCOUNTER
Attempt to contact patient x 1 week without response.     TC, please send letter .    Carolina Vila RN, BSN, PHN  [facility]: [place]

## 2021-05-13 ENCOUNTER — TRANSFERRED RECORDS (OUTPATIENT)
Dept: HEALTH INFORMATION MANAGEMENT | Facility: CLINIC | Age: 71
End: 2021-05-13

## 2021-10-20 ENCOUNTER — OFFICE VISIT (OUTPATIENT)
Dept: FAMILY MEDICINE | Facility: CLINIC | Age: 71
End: 2021-10-20
Payer: COMMERCIAL

## 2021-10-20 VITALS
OXYGEN SATURATION: 97 % | WEIGHT: 234 LBS | TEMPERATURE: 97.4 F | SYSTOLIC BLOOD PRESSURE: 134 MMHG | BODY MASS INDEX: 34.66 KG/M2 | HEART RATE: 80 BPM | DIASTOLIC BLOOD PRESSURE: 72 MMHG | HEIGHT: 69 IN

## 2021-10-20 DIAGNOSIS — I73.9 PAD (PERIPHERAL ARTERY DISEASE) (H): ICD-10-CM

## 2021-10-20 DIAGNOSIS — R73.03 PRE-DIABETES: ICD-10-CM

## 2021-10-20 DIAGNOSIS — Z98.890 HISTORY OF CAROTID ENDARTERECTOMY: ICD-10-CM

## 2021-10-20 DIAGNOSIS — Z86.73 HISTORY OF CVA (CEREBROVASCULAR ACCIDENT): ICD-10-CM

## 2021-10-20 DIAGNOSIS — R07.9 CHEST PAIN, UNSPECIFIED TYPE: ICD-10-CM

## 2021-10-20 DIAGNOSIS — E66.01 MORBID OBESITY (H): ICD-10-CM

## 2021-10-20 DIAGNOSIS — E78.5 HYPERLIPIDEMIA LDL GOAL <70: ICD-10-CM

## 2021-10-20 DIAGNOSIS — I25.10 CORONARY ARTERY DISEASE INVOLVING NATIVE HEART WITHOUT ANGINA PECTORIS, UNSPECIFIED VESSEL OR LESION TYPE: ICD-10-CM

## 2021-10-20 DIAGNOSIS — R35.0 URINARY FREQUENCY: ICD-10-CM

## 2021-10-20 DIAGNOSIS — I10 ESSENTIAL HYPERTENSION WITH GOAL BLOOD PRESSURE LESS THAN 140/90: Primary | ICD-10-CM

## 2021-10-20 PROCEDURE — 99214 OFFICE O/P EST MOD 30 MIN: CPT | Performed by: STUDENT IN AN ORGANIZED HEALTH CARE EDUCATION/TRAINING PROGRAM

## 2021-10-20 RX ORDER — NITROGLYCERIN 0.4 MG/1
TABLET SUBLINGUAL
Qty: 2 TABLET | Refills: 0 | Status: SHIPPED | OUTPATIENT
Start: 2021-10-20 | End: 2023-02-15

## 2021-10-20 RX ORDER — OXYBUTYNIN CHLORIDE 10 MG/1
10 TABLET, EXTENDED RELEASE ORAL AT BEDTIME
Qty: 30 TABLET | Refills: 3 | Status: SHIPPED | OUTPATIENT
Start: 2021-10-20 | End: 2021-12-30

## 2021-10-20 ASSESSMENT — MIFFLIN-ST. JEOR: SCORE: 1788.86

## 2021-10-20 NOTE — PROGRESS NOTES
Assessment & Plan     Essential hypertension with goal blood pressure less than 140/90  Blood pressure well controlled on current medications.  Chronic and stable, continue meds with no changes.    Coronary artery disease involving native heart without angina pectoris, unspecified vessel or lesion type  Recently reestablished care with cardiology, encourage patient to continue following with cardiology given his significant coronary heart disease history.  Recent stress test without any new acute changes  Echo ordered at cardiology visit however do not see this resulted.  Continue to monitor for results.  Continue Plavix and aspirin indefinitely continue statin  - nitroGLYcerin (NITROSTAT) 0.4 MG sublingual tablet; 1 tablet now    History of CVA (cerebrovascular accident)  Persistent deficit in the left lower extremity with weakness.  Recent ED visit for what appears to be a TIA.  Reviewed MRI head and MRA head and neck, no acute findings suggestive of stroke.  Left carotid with 50 to 70% stenosis.  Recommend continued aspirin Plavix and statin.    Hyperlipidemia LDL goal <70  Continue statin.  He was increased to 20 mg of Lipitor at his last visit, discussed the need to recheck his cholesterol and would prefer to do this at his follow-up visit.  He is tolerating the medication well      Urinary frequency  Continued urinary urgency, frequency and concerns for overactive bladder.  His previous provider had prescribed oxybutynin but he did not pick this up because of cost.  We reviewed good Rx options for cheaper locations and coupons.  He will try this, discussed common side effects  - oxybutynin ER (DITROPAN-XL) 10 MG 24 hr tablet; Take 1 tablet (10 mg) by mouth At Bedtime    Morbid obesity (H)  Discussed increasing physical activity as able, he is limited though by his left lower extremity weakness from his stroke    PAD (peripheral artery disease) (H)  Weak peripheral pulses bilaterally, patient denies any  claudication symptoms at this time with exertion or rest.    Pre-diabetes  Needs repeat A1c, patient would like to have these done at his next visit for his annual exam.    History of carotid endarterectomy:   Patient not familiar with all of the details. Does not currently follow with vascular surgery.   Review of recent imaging showed:   -carotid artery ultrasound on 9/9/21: b/l carotid: moderate plaque formation, less than 50% stenosis in R and L ICA, significant external carotid stenosis bilaterally.   -MRA neck: L ICA 50-70% stenosis, R ICA no measurable stenosis   Due to recent what appears to be a TIA (facial droop and L-sided paresthesias), with discrepancy in stenosis of carotis, can consider CTA vs referral. Opted for referral to vascular surgery for consultation. May consider changing from Plavix to Brillinta given symptoms while on DAPT, will wait until vascular surgery evaluation  6}      Return in about 3 months (around 1/20/2022) for Follow up.    Safia Morin DO  [facility]    Teagan Delcid is a 71 year old who presents for the following health issues     HPI     CAD:   Hx of CABG. Hadn't been seen by cardiology sine 2016, but re-established with them this summer. Had an ECHO  And a stress test done that were normal.   Denies chest pain or MONROY, no exertional chest pain or palpitations.     Hx of CVA and carotid stenosis:    Occasionally uses a cane. Has a wheelchair if needed.   Walking is limited to about 1 block, limited due to leg.   Hx of CVA that primarily affected LLE resulting in weakness.   Had a Carotid stent placed in 2010  Denies claudication.   Recently also had a carotid artery ultrasound on 9/9/21: b/l carotid: moderate plaque formation, less than 50% stenosis in R and L ICA, significant external carotid stenosis bilaterally.     Hospital follow up:  Went to the ED on 10/13/21 for facial droop and paresthesias in the left UE.   All symptoms resolved.  He denies any current new weakness, paresthesias, facial droop, speech or vision changes.    MRI brain: no acute or subacute infarct. Chronic microvascular ischemic changes, chronic lacunar infarct right corona radiata, mild generalized cerebral atrophy.   Head MRA: normal  Neck MRA: right carotid no measurable stenosis. Left carotid: atherosclerotic plaque results in 50-70% stenosis in the left ICA.              Hyperlipidemia Follow-Up      Are you regularly taking any medication or supplement to lower your cholesterol?   Yes- Atorvastatin 20 mg    Are you having muscle aches or other side effects that you think could be caused by your cholesterol lowering medication?  No     Lipitor was increased from 10mg to 20mg at his last visit in 2021, he denies any side effects from the increased  Dose.     Hypertension Follow-up      Do you check your blood pressure regularly outside of the clinic? No has a bp machine but does not use it. Having trouble getting it to work. Cuff machine      Are you following a low salt diet? tries too     Are your blood pressures ever more than 140 on the top number (systolic) OR more   than 90 on the bottom number (diastolic), for example 140/90?     Vascular Disease Follow-up      How often do you take nitroglycerin? Never used it, . Last ordered     Do you take an aspirin every day? Yes and also takes Plavix daily       How many servings of fruits and vegetables do you eat daily?  0-1    On average, how many sweetened beverages do you drink each day (Examples: soda, juice, sweet tea, etc.  Do NOT count diet or artificially sweetened beverages)?   1    How many days per week do you exercise enough to make your heart beat faster?  NA  How many days per week do you miss taking your medication? Once a day he tends to miss it.    What makes it hard for you to take your medications?  remembering to take       He does have a pill box     Never picked up Oxybutynin due to  "price- expensive. He does still have issues with overactive bladder, sometimes resulting in incontinence. He endorse urinary urgency.    Quit smoking in 1986. Smoked a pipe for awhile.     Review of Systems   Constitutional, HEENT, cardiovascular, pulmonary, gi and gu systems are negative, except as otherwise noted.      Objective    /72 (BP Location: Right arm, Patient Position: Sitting, Cuff Size: Adult Regular)   Pulse 80   Temp 97.4  F (36.3  C) (Oral)   Ht 1.74 m (5' 8.5\")   Wt 106.1 kg (234 lb)   SpO2 97%   BMI 35.06 kg/m    Body mass index is 35.06 kg/m .  Physical Exam   GENERAL: healthy, alert and no distress  NECK: no adenopathy, no asymmetry, masses, or scars and thyroid normal to palpation  RESP: lungs clear to auscultation - no rales, rhonchi or wheezes  CV: regular rate and rhythm, normal S1 S2, no S3 or S4, no murmur, click or rub, 1+ pitting peripheral edema and peripheral pulses weak in b/l dorsalis  Pedis   ABDOMEN: soft, nontender, no hepatosplenomegaly, no masses and bowel sounds normal  MS: no gross musculoskeletal defects noted, 1+ pitting edema in b/l LE.   NEURO: 4/5 strength of left distal LE, 5/5 right LE, normal tone, mentation intact and speech normal, CN II-XII  intact  PSYCH: mentation appears normal, affect normal/bright    "

## 2021-10-20 NOTE — PATIENT INSTRUCTIONS
Consider getting vaccines: Shingles, COVID-19 vaccine, influenza vaccine, Pneumonia vaccine, tetanus vaccine.       Patient Education     Low-Salt Choices  Eating salt (sodium) can make your body retain too much water. Extra water makes your heart work harder. Canned, packaged, and frozen foods are easy to prepare. But they are often high in sodium. Here are some ideas for low-salt foods you can easily make yourself.   For breakfast    Fruit or 100% fruit juice. It's better to have whole fruit instead of 100% fruit juice.    Whole-wheat bread or an English muffin. Look for sodium content on Nutrition Facts labels.    Low-fat milk or yogurt    Unsalted eggs    Shredded wheat    Corn tortillas    Unsalted steamed rice    Regular (not instant) hot cereal, made without salt    Stay away from    Sausage, hargrove, and ham    Flour tortillas    Packaged muffins, pancakes, and biscuits    Instant hot cereals    Cottage cheese    For lunch and dinner    Fresh fish, chicken, turkey, or meat--baked, broiled, or roasted without salt    Dry beans, cooked without salt    Tofu, stir-fried without salt    Unsalted fresh fruit and vegetables, or frozen or canned fruit and vegetables with no added salt  Stay away from    Lunch or deli meat that is cured or smoked    Cheese    Tomato juice and ketchup    Canned vegetables, soups, and fish not labeled as no-salt-added or reduced sodium    Packaged gravies and sauces    Olives, pickles, and relish    Bottled salad dressings    For snacks and desserts    Yogurt    Unsalted, air-popped popcorn    Unsalted nuts or seeds  Stay away from    Pies and cakes    Packaged dessert mixes    Pizza    Canned and packaged puddings    Pretzels, chips, crackers, and nuts--unless the label says unsalted    [employer] last reviewed this educational content on 11/1/2019 2000-2021 [employer]. All rights reserved. This information is not intended as a substitute for professional medical care.  Always follow your healthcare professional's instructions.

## 2021-10-22 ENCOUNTER — TELEPHONE (OUTPATIENT)
Dept: OTHER | Facility: CLINIC | Age: 71
End: 2021-10-22

## 2021-10-22 NOTE — TELEPHONE ENCOUNTER
Scheduled for 11/2/2021 with Dr. Quevedo in [place].     Patient was offered:  10/25/2021 with Dr. Johnson  10/26/2021 with Dr. Quevedo     In [place].   Patient did not want to drive to [place].    Advised that if his symptoms worsen to he should report immediately to ERJohnny ROCHA

## 2021-10-22 NOTE — TELEPHONE ENCOUNTER
Referred to [facility] by Safia Morin,  for recent TIA and carotid US <50% but MRA neck Left ICA 50-70%. Has hx of CEA and CVA.    Hx: right CEA (05/08/2009) - : Nate Mejia MD    Pt needs to be scheduled for new patient consult with Vascular Surgery ASAP.  Trinity Marcos, ISABELLAN, RN  [facility] [place]

## 2021-11-02 ENCOUNTER — OFFICE VISIT (OUTPATIENT)
Dept: VASCULAR SURGERY | Facility: CLINIC | Age: 71
End: 2021-11-02
Attending: STUDENT IN AN ORGANIZED HEALTH CARE EDUCATION/TRAINING PROGRAM
Payer: COMMERCIAL

## 2021-11-02 VITALS
HEART RATE: 65 BPM | SYSTOLIC BLOOD PRESSURE: 134 MMHG | WEIGHT: 234 LBS | BODY MASS INDEX: 35.06 KG/M2 | OXYGEN SATURATION: 96 % | DIASTOLIC BLOOD PRESSURE: 78 MMHG

## 2021-11-02 DIAGNOSIS — I65.21 STENOSIS OF RIGHT CAROTID ARTERY: Primary | ICD-10-CM

## 2021-11-02 DIAGNOSIS — Z98.890 HISTORY OF CAROTID ENDARTERECTOMY: ICD-10-CM

## 2021-11-02 DIAGNOSIS — Z86.73 HISTORY OF CVA (CEREBROVASCULAR ACCIDENT): ICD-10-CM

## 2021-11-02 DIAGNOSIS — I70.212 ATHEROSCLEROSIS OF NATIVE ARTERIES OF EXTREMITIES WITH INTERMITTENT CLAUDICATION, LEFT LEG (H): ICD-10-CM

## 2021-11-02 PROCEDURE — 99205 OFFICE O/P NEW HI 60 MIN: CPT | Performed by: SURGERY

## 2021-11-02 NOTE — NURSING NOTE
"Chief Complaint   Patient presents with     Consult     review scan       Vitals:    11/02/21 1628   BP: 134/78   BP Location: Left arm   Patient Position: Sitting   Cuff Size: Adult Large   Pulse: 65   SpO2: 96%   Weight: 106.1 kg (234 lb)     Wt Readings from Last 1 Encounters:   11/02/21 106.1 kg (234 lb)     Ht Readings from Last 1 Encounters:   10/20/21 1.74 m (5' 8.5\")       Bethany Andrea [facility], 11/2/2021 4:29 PM    "

## 2021-11-02 NOTE — PROGRESS NOTES
"Vascular Surgery Clinic     Andrew Torres MRN# [id number]   YOB: 1950 Age: 71 year old        Reason for Clinic Visit: Carotid stenosis              History of Present Illness:   Andrew Torres is a 71 year old male who presents for evaluation of carotid stenosis.      He believes that about 3 years ago he had a stroke; his left hand and arm went numb, and he had some left facial sagging or drooping. This was after he had right carotid endarterectomy for asymptomatic high-grade disease, per his report. He says he still feels like his left hand falls asleep too quickly, and he needs to pay attention to the left hand or it will drop down or lose  on whatever he tries to hold or carry. About 3 weeks ago he had what he says was a day-long episode of weakness and numbness in the left arm and leg. He says the left eye felt \"like a kaleidoscope\" a few days ago, with trouble seeing; this spontaneously resolved.     He says he is a little slower with walking secondary to soreness in his left hip. He is able to walk with a cane. He estimates he can walk about 1/2 block before his left hip will hurt, and sitting will alleviate the discomfort.             Past Medical History:   I have personally reviewed the following:   History reviewed. No pertinent past medical history.   CAD s/p stenting in 2000, 2010, 2012, 2014 (4 stents overlapping in RCA per notes from 2014) and CABG (\"He underwent bypass surgery in 2015 with a LEONARDO to the PDA as of 3 graft off of his vein graft to the circumflex with a vein graft to the distal circumflex.\")  Stroke   DL  Obesity, BMI 35  Urinary frequency attributed to BPH and overactive bladder  T2DM  Right carotid endarterectomy recorded on 5/8/09 by Dr. Mejia at [facility], for symptomatic high-grade stenosis          Past Surgical History:   I have personally reviewed the following:   No past surgical history on file.         Social History:   I have personally " reviewed the following:   Social History     Tobacco Use     Smoking status: Former Smoker     Smokeless tobacco: Never Used     Tobacco comment: Quit in the 80's 86?   Substance Use Topics     Alcohol use: Yes     Comment: 4 drinks per week     Quit smoking in 1986 after 20-30 years, under 1 ppd. Drinks 2-3 beers or cocktails twice a week (Karaoke nights and [employer] nights). No illicit drug use. Lives at home with his wife and grandson, two cats and a rabbit. Worked as a  and then in [employer] for [employer]. Served in the [employer].            Family History:     Family History   Problem Relation Age of Onset     Hypertension Mother      Hypertension Father      Heart Disease Paternal Grandmother      Cancer Sister         stomach?      Parkinsonism Brother              Allergies:     Allergies   Allergen Reactions     Sulfa Drugs Hives     Hmg-Coa-R Inhibitors Muscle Pain (Myalgia)             Medications:     Current Outpatient Medications Ordered in Epic   Medication     amLODIPine (NORVASC) 10 MG tablet     ASPIRIN 81 PO     atorvastatin (LIPITOR) 20 MG tablet     carvedilol (COREG) 25 MG tablet     clopidogrel (PLAVIX) 75 MG tablet     Cyanocobalamin (B-12) 1000 MCG CAPS     lisinopril (ZESTRIL) 10 MG tablet     multivitamin, therapeutic with minerals (THERA-VIT-M) TABS tablet     nitroGLYcerin (NITROSTAT) 0.4 MG sublingual tablet     oxybutynin ER (DITROPAN-XL) 10 MG 24 hr tablet     Current Facility-Administered Medications Ordered in Epic   Medication Dose Route Frequency Last Rate Last Admin     barium sulfate 40% (VARIBAR NECTAR) oral suspension   Oral Once         barium sulfate 40% (VARIBAR THIN HONEY) oral suspension   Oral Once                 Review of Systems:   The 10 point Review of Systems is negative other than noted in the HPI          Physical Exam:   Vitals were reviewed      BP: 134/78 Pulse: 65     SpO2: 96 %        General: sitting comfortably on exam table, NAD.  "  Neuro/Psych: A&O x 4, pleasantly conversant   HENT: EOMI and conjugate; significant bilateral eyelid ptosis. Moist mucous membranes. Facial sensation and muscle movement symmetric BL. Hearing intact to normal speaking tone. No hoarseness of voice. Shoulder shrug symmetric. Tongue protrudes midline. No carotid bruits; no endarterectomy incisions.  Cardiac: Regular rate and rhythm, no m/g appreciated.   Pulm: Lungs CTAB, no w/r/r.  Abd: Soft, non-distended, no tenderness to palpation. No abdominal scars. Umbilical hernia, reducible. Obese.   Extrem: Grossly normal and symmetric ROM in all four extremities. BL pitting edema to high calves or knees.   Skin: Clean, dry, no rashes or wounds.  Vasc: strong biphasic DP and PT doppler signals bilaterally          Data:   Labs:       Lab Results   Component Value Date     01/08/2021    Lab Results   Component Value Date    CHLORIDE 109 01/08/2021    Lab Results   Component Value Date    BUN 19 01/08/2021      Lab Results   Component Value Date    POTASSIUM 3.8 01/08/2021    Lab Results   Component Value Date    CO2 27 01/08/2021    Lab Results   Component Value Date    CR 1.07 01/08/2021        Lab Results   Component Value Date    WBC 7.5 01/08/2021    HGB 14.6 01/08/2021    HCT 44.2 01/08/2021    MCV 94 01/08/2021     01/08/2021     Lipid Panel (1/8/21): Chol 184   HDL 39     Trig 169    I have reviewed the following images:  NM Stress Echo (8/23/21 - [facility], no images available): \"1. Moderate-sized, fixed defect of mild intensity involving basal to mid  inferolateral/inferior walls; findings suggestive of nontransmural  infarction. No significant inducible defect to suggest ischemia.   3. Normal left ventricular size & calculated ejection fraction; basal to  mid inferior/inferolateral walls appear mildly hypokinetic.  4. Aortic root & ascending aorta are normal in size, given limitations of  non-contrast/non-gated study. 5. Abnormal isotope " "uptake in the right upper chest anteriorly,  incompletely/not well visualized.  When compared to previous study, no significant changes noted, including in the abnormal isotope uptake in the right upper chest anteriorly.\"      MR Brain (10/13/21 - current; [facility], no images available):   \"HEAD MRI:   1.  No acute findings. 2.  Chronic hemorrhagic lacunar infarct right corona radiata with ex vacuo dilatation of the right lateral ventricle.   HEAD MRA:   1.  Motion degraded exam at the [place].   2.  No branch occlusion, significant flow-limiting.   3.  Broad-based shallow medially directed 3 mm aneurysm at the cavernous right ICA.   4.  Question 2 mm medially directed aneurysm at the distal cavernous left ICA.   NECK MRA:   1.  Atherosclerotic plaque results in 50-70% stenosis in the left ICA.   2.  Diminutive nondominant right vertebral artery is not visualized at its origin or proximal V2 segment. Visualized mid V2 segment into the head..\"      MRI Brain (2/18/2018 - [facility], no images available)  \"Findings: Restricted diffusion involving anterior right basal ganglia and extending to the right paraventricular region in the region of the anterior limb of the internal capsule. This is consistent with a moderately large deep lacunar-type infarct. Ventricles are normal size. Mild changes of underlying chronic small vessel disease. No blood products. Intracranial structures otherwise appear negative.\"      Duplex Carotids (9/9/21 - current; [facility], no images available):  \"RIGHT: Moderate plaque at the bifurcation. The peak systolic velocity in the ICA is 125-230 cm/sec, consistent with 50-69% stenosis. Borderline elevated external carotid velocities. Retrograde flow in the vertebral artery.   LEFT: Mild plaque at the bifurcation. The peak systolic velocity in the ICA is less than 125 cm/sec, consistent with less than 50% stenosis. Borderline elevated external carotid velocities. Antegrade " "flow within the vertebral artery.    VELOCITY CHART:  CCA   Right: 105 cm/s   Left: 144 cm/s  ICA   Right: 136 cm/s   Left: 93 cm/s  ECA   Right: 283 cm/s   Left: 238 cm/s  ICA/CCA PSV Ratio   Right: 1.6   Left: 1.2    IMPRESSION:  1.  Right carotid: Moderate plaque formation, velocities consistent with less than 50% stenosis in the right internal carotid artery. Significant external carotid stenosis.  2.  Left carotid: Mild plaque formation, velocities consistent with less than 50% stenosis in the left internal carotid artery. Significant external carotid stenosis.  3.  Retrograde flow in the right vertebral artery, new since the prior duplex, suggestive of evolving subclavian stenosis.  4.  Antegrade flow in the left vertebral artery.\"       Duplex Carotids (9/9/20 - one year ago; [facility], no images available)  \"RIGHT: Mild plaque at the bifurcation. The peak systolic velocity in the ICA is less than 125 cm/sec, consistent with less than 50% stenosis. Elevated velocities in the ECA consistent with stenosis. Antegrade flow within the  vertebral artery.  LEFT: Mild plaque at the bifurcation. The peak systolic velocity in the ICA is less than 125 cm/sec, consistent with less than 50% stenosis. Elevated  velocities in the ECA consistent with stenosis. Antegrade flow within the  vertebral artery.     VELOCITY CHART:   CCA   Right: 98 cm/s   Left: 145 cm/s   ICA   Right: 102 cm/s   Left: 91 cm/s   ECA   Right: 202 cm/s   Left: 212 cm/s   ICA/CCA PSV Ratio   Right: 1.05   Left: 0.62     IMPRESSION:   1.  Mild plaque formation, velocities consistent with less than 50% stenosis in the right internal carotid artery.   2.  Mild plaque formation, velocities consistent with less than 50% stenosis in  the left internal carotid artery.   3.  Flow within the vertebral arteries is antegrade.   4.  Bilateral external carotid artery stenosis is again noted.\"             Assessment and Plan:   Mr. Torres is a 71 year old male " with history of CAD, s/p extensive stenting and CABG with ICM; HTN; DL; GERD; obesity; PAD with carotid stenosis. He had a recent transient episode of left arm and leg numbness and weakness concerning for TIA, and was referred for further evaluation.       I explained to Mr. Torres that I do not have all the imaging studies available to review. He does have history recorded of prior right CEA and the symptoms would be suggestive of a right hemispheric stroke, although the MRI findings sound similar to what was previously seen in 2018. Discussed that significant disease in the external carotid arteries can occasionally obscure internal carotid disease, and that MRA is less clear for determining degree of stenosis in the carotids.     Will plan to obtain a CTA of the head and neck for a more definitive picture of any new or recurrent high-grade carotid stenosis. Explained the rationale behind this.     He has some complaints consistent with claudication, and expresses interest in a structured exercise therapy program. Will place referral for PAD rehab as well.     His atorvastatin was recently increased; he does need high-intensity statin therapy with goal LDL < 70 given his comorbidities.    Will see him back in clinic to review CTA and further interventions if needed.     Katty Quevedo MD     Total time spent on the date of this encounter doing: chart review, review of test results, patient visit, physical exam, education, counseling, developing plan of care, and documenting = 75 minutes

## 2021-11-03 ENCOUNTER — TELEPHONE (OUTPATIENT)
Dept: OTHER | Facility: CLINIC | Age: 71
End: 2021-11-03
Payer: COMMERCIAL

## 2021-11-03 NOTE — TELEPHONE ENCOUNTER
Per 11/2/21 visit with Dr. Quevedo, pt needs the following:    CTA head/neck    In clinic visit with Dr. Quevedo at next available to discuss results ([place] location)    ISABELLA VillaN, RN-[facility] [place]

## 2021-11-03 NOTE — TELEPHONE ENCOUNTER
DEEPALI to schedule CT and in person OV w/ELLA Fernandez) at next available.    Appt Note:  Follow up to 11/2/2021     Elvia ROCHA

## 2021-11-09 NOTE — TELEPHONE ENCOUNTER
2nd and final attempt    LM to schedule CT and in person OV w/ELLA ([place]) at next available.      Elvia ROCHA

## 2021-11-11 NOTE — TELEPHONE ENCOUNTER
No contact from patient to schedule after 2 attempts. No further attempts to be made.     Elvia ROCHA   Routing to nurse MELCHOR as CALVIN

## 2021-12-02 DIAGNOSIS — R35.0 URINARY FREQUENCY: ICD-10-CM

## 2021-12-10 ENCOUNTER — VIRTUAL VISIT (OUTPATIENT)
Dept: FAMILY MEDICINE | Facility: CLINIC | Age: 71
End: 2021-12-10
Payer: COMMERCIAL

## 2021-12-10 DIAGNOSIS — R53.83 OTHER FATIGUE: Primary | ICD-10-CM

## 2021-12-10 PROCEDURE — 99213 OFFICE O/P EST LOW 20 MIN: CPT | Mod: 95 | Performed by: FAMILY MEDICINE

## 2021-12-10 ASSESSMENT — ENCOUNTER SYMPTOMS
NAUSEA: 0
HEMATURIA: 0
WEAKNESS: 0
DIARRHEA: 0
MYALGIAS: 0
COUGH: 0
HEADACHES: 0
JOINT SWELLING: 0
FREQUENCY: 0
PALPITATIONS: 0
HEARTBURN: 0
FATIGUE: 1
PARESTHESIAS: 0
EYE PAIN: 0
HEMATOCHEZIA: 0
SORE THROAT: 0
DIZZINESS: 0
ABDOMINAL PAIN: 0
SHORTNESS OF BREATH: 1
ARTHRALGIAS: 0
NERVOUS/ANXIOUS: 0
APPETITE CHANGE: 1
CHILLS: 0
CONSTIPATION: 0
DYSURIA: 0
FEVER: 1

## 2021-12-10 NOTE — PATIENT INSTRUCTIONS
Tony Delcid,    Thank you for allowing [facility] to manage your care.    I ordered COVID-19 and influenza testing, our schedulers will be calling you.     For your convenience, test results are released as soon as they are available  Please allow 1-2 business days for me to send you a comment about your results.  If not done so, I encourage you to login into [employer] ([email or web address] to review your results in real time.     If you have any questions or concerns, please feel free to call us at [phone].    Sincerely,    Dr. Negron    Did you know?      You can schedule a video visit for follow-up appointments as well as future appointments for certain conditions.  Please see the below link.     [email or web address]    If you have not already done so,  I encourage you to sign up for [employer] ([email or web address]).  This will allow you to review your results, securely communicate with a provider, and schedule virtual visits as well.

## 2021-12-10 NOTE — PROGRESS NOTES
Bhavin is a 71 year old who is being evaluated via a billable telephone visit.      What phone number would you like to be contacted at? [phone]  How would you like to obtain your AVS? Mail a copy    1. Other fatigue  Advised to go to the ER if symptoms do not improve.   - Symptomatic COVID-19 Virus (Coronavirus) by PCR; Future  - Influenza A & B Antigen - Clinic Collect; Future        Patient Active Problem List    Diagnosis Date Noted     Morbid obesity (H) 10/20/2021     Priority: Medium     Benign neoplasm of skin 01/10/2021     Priority: Medium     Carotid artery occlusion 01/10/2021     Priority: Medium     Jul 21, 2017 Entered By: JOHNNY ANN Comment: Carotid artery stenosis s/p Right carotid endarterectomy summer 2009       Former light tobacco smoker 01/10/2021     Priority: Medium     Jul 21, 2017 Entered By: JOHNNY ANN Comment: quitJul 21, 2017 Entered By: JOHNNY ANN Comment: 1986       Hemorrhoids without complication 01/10/2021     Priority: Medium     Tinnitus 01/10/2021     Priority: Medium     PAD (peripheral artery disease) (H)-  07/22/2019     Priority: Medium     History of carotid endarterectomy 07/22/2019     Priority: Medium     History of CVA (cerebrovascular accident) 06/25/2018     Priority: Medium     Left hemiparesis (H) 06/25/2018     Priority: Medium     Uric acid kidney stone 05/15/2018     Priority: Medium     Hyperlipidemia LDL goal <70 12/11/2017     Priority: Medium     Essential hypertension with goal blood pressure less than 140/90 05/16/2016     Priority: Medium     Gastroesophageal reflux disease without esophagitis 11/17/2015     Priority: Medium     Coronary artery disease involving coronary bypass graft of native heart without angina pectoris 10/05/2015     Priority: Medium     S/P CABG (coronary artery bypass graft) 08/17/2015     Priority: Medium     Overview:   8/13/2015  LEONARDO to PDA (free graft off of CX vein graft)  SVG to distal CX       Acute MI,  inferior wall (H) 08/01/2015     Priority: Medium     Advanced directives, counseling/discussion 04/09/2013     Priority: Medium     Patient does not have ACD.  Declined info.  Tova Aaron  Advance Care Planning 7/16/2015: Receipt of ACP document:  Received: Health Care Directive which was witnessed or notarized on 12/04/2014.  Document not previously scanned.  Validation form completed and sent with document to be scanned.  Code Status reflects choices in most recent ACP document.  Confirmed/documented designated decision maker(s).  Added by Charlette Vieira           CAD (coronary artery disease) stent placed 2006- life long plavix recommended 03/11/2013     Priority: Medium     Carotid stenosis stent placed right 2010 03/11/2013     Priority: Medium     Intermittent claudication (H) 02/05/2010     Priority: Medium     Overview:   3/10 Successful [place] atherectomy and adjunctive balloon angioplasty for   suboptimal atherectomy result of right common femoral artery from initial 90%   stenosis to less than 20%.        Current Outpatient Medications   Medication     amLODIPine (NORVASC) 10 MG tablet     ASPIRIN 81 PO     atorvastatin (LIPITOR) 20 MG tablet     carvedilol (COREG) 25 MG tablet     clopidogrel (PLAVIX) 75 MG tablet     Cyanocobalamin (B-12) 1000 MCG CAPS     lisinopril (ZESTRIL) 10 MG tablet     multivitamin, therapeutic with minerals (THERA-VIT-M) TABS tablet     nitroGLYcerin (NITROSTAT) 0.4 MG sublingual tablet     oxybutynin ER (DITROPAN-XL) 10 MG 24 hr tablet     No current facility-administered medications for this visit.     Facility-Administered Medications Ordered in Other Visits   Medication     barium sulfate 40% (VARIBAR NECTAR) oral suspension     barium sulfate 40% (VARIBAR THIN HONEY) oral suspension         Teagan Delcid is a 71 year old who presents for the following health issues     HPI     Body aches, fatigue, possible fever       1. Not feeling well: Achy and run down.   Ongoing for the past couple weeks.  Felt warm yesterday.  No cough symptoms.  No sore throat or ear pain.  Decreased appetite.  Feels fatigue.  States of SOB.  No chest pain.     Review of Systems   Constitutional: Positive for appetite change, fatigue and fever. Negative for chills.   HENT: Negative for congestion, ear pain, hearing loss and sore throat.    Eyes: Negative for pain and visual disturbance.   Respiratory: Positive for shortness of breath. Negative for cough.    Cardiovascular: Negative for chest pain, palpitations and peripheral edema.   Gastrointestinal: Negative for abdominal pain, constipation, diarrhea, heartburn, hematochezia and nausea.   Genitourinary: Negative for dysuria, frequency, genital sores, hematuria, impotence, penile discharge and urgency.   Musculoskeletal: Negative for arthralgias, joint swelling and myalgias.   Skin: Negative for rash.   Neurological: Negative for dizziness, weakness, headaches and paresthesias.   Psychiatric/Behavioral: Negative for mood changes. The patient is not nervous/anxious.           Objective         Vitals:  No vitals were obtained today due to virtual visit.    Physical Exam   healthy, alert and no distress  PSYCH: Alert and oriented times 3; coherent speech, normal   rate and volume, able to articulate logical thoughts, able   to abstract reason, no tangential thoughts, no hallucinations   or delusions  His affect is normal  RESP: No cough, no audible wheezing, able to talk in full sentences  Remainder of exam unable to be completed due to telephone visits    Phone call duration: 15 minutes

## 2021-12-30 RX ORDER — OXYBUTYNIN CHLORIDE 10 MG/1
TABLET, EXTENDED RELEASE ORAL
Qty: 90 TABLET | Refills: 0 | Status: SHIPPED | OUTPATIENT
Start: 2021-12-30 | End: 2023-01-18

## 2022-01-14 ENCOUNTER — OFFICE VISIT (OUTPATIENT)
Dept: FAMILY MEDICINE | Facility: CLINIC | Age: 72
End: 2022-01-14
Payer: COMMERCIAL

## 2022-01-14 ENCOUNTER — ANCILLARY PROCEDURE (OUTPATIENT)
Dept: GENERAL RADIOLOGY | Facility: CLINIC | Age: 72
End: 2022-01-14
Attending: STUDENT IN AN ORGANIZED HEALTH CARE EDUCATION/TRAINING PROGRAM
Payer: COMMERCIAL

## 2022-01-14 VITALS
TEMPERATURE: 96.4 F | OXYGEN SATURATION: 100 % | WEIGHT: 238 LBS | BODY MASS INDEX: 35.25 KG/M2 | DIASTOLIC BLOOD PRESSURE: 78 MMHG | HEIGHT: 69 IN | SYSTOLIC BLOOD PRESSURE: 138 MMHG | HEART RATE: 56 BPM

## 2022-01-14 DIAGNOSIS — R35.0 URINARY FREQUENCY: ICD-10-CM

## 2022-01-14 DIAGNOSIS — Z86.73 HISTORY OF CVA (CEREBROVASCULAR ACCIDENT): ICD-10-CM

## 2022-01-14 DIAGNOSIS — I65.29 OCCLUSION OF CAROTID ARTERY, UNSPECIFIED LATERALITY: ICD-10-CM

## 2022-01-14 DIAGNOSIS — E11.59 TYPE 2 DIABETES MELLITUS WITH OTHER CIRCULATORY COMPLICATIONS (H): ICD-10-CM

## 2022-01-14 DIAGNOSIS — N32.81 OVERACTIVE BLADDER: ICD-10-CM

## 2022-01-14 DIAGNOSIS — M54.50 ACUTE MIDLINE LOW BACK PAIN WITHOUT SCIATICA: ICD-10-CM

## 2022-01-14 DIAGNOSIS — I25.810 CORONARY ARTERY DISEASE INVOLVING CORONARY BYPASS GRAFT OF NATIVE HEART WITHOUT ANGINA PECTORIS: ICD-10-CM

## 2022-01-14 DIAGNOSIS — I10 ESSENTIAL HYPERTENSION WITH GOAL BLOOD PRESSURE LESS THAN 140/90: ICD-10-CM
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
## 2022-01-14 DIAGNOSIS — E66.01 MORBID OBESITY (H): ICD-10-CM

## 2022-01-14 DIAGNOSIS — Z95.1 S/P CABG (CORONARY ARTERY BYPASS GRAFT): ICD-10-CM

## 2022-01-14 DIAGNOSIS — E78.5 HYPERLIPIDEMIA LDL GOAL <70: ICD-10-CM

## 2022-01-14 DIAGNOSIS — Z00.00 ENCOUNTER FOR MEDICARE ANNUAL WELLNESS EXAM: Primary | ICD-10-CM

## 2022-01-14 DIAGNOSIS — N40.0 BENIGN PROSTATIC HYPERPLASIA, UNSPECIFIED WHETHER LOWER URINARY TRACT SYMPTOMS PRESENT: ICD-10-CM

## 2022-01-14 LAB — HBA1C MFR BLD: 6.4 % (ref 0–5.6)

## 2022-01-14 PROCEDURE — 83036 HEMOGLOBIN GLYCOSYLATED A1C: CPT | Performed by: STUDENT IN AN ORGANIZED HEALTH CARE EDUCATION/TRAINING PROGRAM

## 2022-01-14 PROCEDURE — 82043 UR ALBUMIN QUANTITATIVE: CPT | Performed by: STUDENT IN AN ORGANIZED HEALTH CARE EDUCATION/TRAINING PROGRAM

## 2022-01-14 PROCEDURE — G0438 PPPS, INITIAL VISIT: HCPCS | Performed by: STUDENT IN AN ORGANIZED HEALTH CARE EDUCATION/TRAINING PROGRAM

## 2022-01-14 PROCEDURE — 72100 X-RAY EXAM L-S SPINE 2/3 VWS: CPT | Mod: FY | Performed by: RADIOLOGY

## 2022-01-14 PROCEDURE — 99207 PR FOOT EXAM NO CHARGE: CPT | Performed by: STUDENT IN AN ORGANIZED HEALTH CARE EDUCATION/TRAINING PROGRAM

## 2022-01-14 PROCEDURE — 99214 OFFICE O/P EST MOD 30 MIN: CPT | Mod: 25 | Performed by: STUDENT IN AN ORGANIZED HEALTH CARE EDUCATION/TRAINING PROGRAM

## 2022-01-14 PROCEDURE — 80048 BASIC METABOLIC PNL TOTAL CA: CPT | Performed by: STUDENT IN AN ORGANIZED HEALTH CARE EDUCATION/TRAINING PROGRAM

## 2022-01-14 PROCEDURE — 80061 LIPID PANEL: CPT | Performed by: STUDENT IN AN ORGANIZED HEALTH CARE EDUCATION/TRAINING PROGRAM

## 2022-01-14 PROCEDURE — 36415 COLL VENOUS BLD VENIPUNCTURE: CPT | Performed by: STUDENT IN AN ORGANIZED HEALTH CARE EDUCATION/TRAINING PROGRAM

## 2022-01-14 RX ORDER — ATORVASTATIN CALCIUM 40 MG/1
40 TABLET, FILM COATED ORAL DAILY
Qty: 90 TABLET | Refills: 3 | Status: SHIPPED | OUTPATIENT
Start: 2022-01-14 | End: 2022-11-23

## 2022-01-14 ASSESSMENT — ENCOUNTER SYMPTOMS
PALPITATIONS: 0
NERVOUS/ANXIOUS: 0
FREQUENCY: 0
HEADACHES: 0
CHILLS: 0
MYALGIAS: 0
WEAKNESS: 0
PARESTHESIAS: 0
JOINT SWELLING: 0
DYSURIA: 0
HEMATURIA: 0
ABDOMINAL PAIN: 0
HEMATOCHEZIA: 0
FEVER: 0
DIZZINESS: 0
DIARRHEA: 0
CONSTIPATION: 0
HEARTBURN: 0
COUGH: 0
NAUSEA: 0
SHORTNESS OF BREATH: 0
EYE PAIN: 0
ARTHRALGIAS: 1
SORE THROAT: 0

## 2022-01-14 ASSESSMENT — ACTIVITIES OF DAILY LIVING (ADL): CURRENT_FUNCTION: NO ASSISTANCE NEEDED

## 2022-01-14 ASSESSMENT — PATIENT HEALTH QUESTIONNAIRE - PHQ9
SUM OF ALL RESPONSES TO PHQ QUESTIONS 1-9: 16
10. IF YOU CHECKED OFF ANY PROBLEMS, HOW DIFFICULT HAVE THESE PROBLEMS MADE IT FOR YOU TO DO YOUR WORK, TAKE CARE OF THINGS AT HOME, OR GET ALONG WITH OTHER PEOPLE: NOT DIFFICULT AT ALL
SUM OF ALL RESPONSES TO PHQ QUESTIONS 1-9: 16

## 2022-01-14 ASSESSMENT — PAIN SCALES - GENERAL: PAINLEVEL: WORST PAIN (10)

## 2022-01-14 ASSESSMENT — MIFFLIN-ST. JEOR: SCORE: 1807

## 2022-01-14 NOTE — PATIENT INSTRUCTIONS
Call to schedule the CTA of your head and neck that was ordered by vascular surgery. Call [phone] to schedule.     Also call to schedule your follow up appointment with Vascular surgery at [facility].       You'll get a call to schedule the urology appointment. If you don't get in to see them, we can discuss increasing your dose of your oxybutynin medication for your bladder when your bottle runs low.     We will increase your cholesterol medication to Lipitor 40mg daily. Stop taking 20mg dose.     Call to make your eye doctor appointment at [facility] in [place].     Get Salonpas patches or Lidocaine patches over the counter and apply to your back for pain relief.         Patient Education   Personalized Prevention Plan  You are due for the preventive services outlined below.  Your care team is available to assist you in scheduling these services.  If you have already completed any of these items, please share that information with your care team to update in your medical record.  Health Maintenance Due   Topic Date Due     ANNUAL REVIEW OF HM ORDERS  Never done     COVID-19 Vaccine (1) Never done     Zoster (Shingles) Vaccine (1 of 2) Never done     LUNG CANCER SCREENING  Never done     Pneumococcal Vaccine (2 of 2 - PPSV23) 05/06/2014     Eye Exam  08/03/2016     Diptheria Tetanus Pertussis (DTAP/TDAP/TD) Vaccine (4 - Td or Tdap) 05/06/2019     A1C Lab  07/08/2021     Flu Vaccine (1) Never done     Basic Metabolic Panel  01/08/2022     Cholesterol Lab  01/08/2022     Kidney Microalbumin Urine Test  01/08/2022     Diabetic Foot Exam  01/08/2022     FALL RISK ASSESSMENT  01/08/2022     Preventive Health Recommendations  See your health care provider every year to    Review health changes.     Discuss preventive care.      Review your medicines if your doctor has prescribed any.    Talk with your health care provider about whether you should have a test to screen for prostate cancer (PSA).    Every 3 years,  have a diabetes test (fasting glucose). If you are at risk for diabetes, you should have this test more often.    Every 5 years, have a cholesterol test. Have this test more often if you are at risk for high cholesterol or heart disease.     Every 10 years, have a colonoscopy. Or, have a yearly FIT test (stool test). These exams will check for colon cancer.    Talk to with your health care provider about screening for Abdominal Aortic Aneurysm if you have a family history of AAA or have a history of smoking.    Shots:     Get a flu shot each year.     Get a tetanus shot every 10 years.     Talk to your doctor about your pneumonia vaccines. There are now two you should receive - Pneumovax (PPSV 23) and Prevnar (PCV 13).    Talk to your pharmacist about a shingles vaccine.     Talk to your doctor about the hepatitis B vaccine.    Nutrition:     Eat at least 5 servings of fruits and vegetables each day.     Eat whole-grain bread, whole-wheat pasta and brown rice instead of white grains and rice.     Get adequate Calcium and Vitamin D.     Lifestyle    Exercise for at least 150 minutes a week (30 minutes a day, 5 days a week). This will help you control your weight and prevent disease.     Limit alcohol to one drink per day.     No smoking.     Wear sunscreen to prevent skin cancer.     See your dentist every six months for an exam and cleaning.     See your eye doctor every 1 to 2 years to screen for conditions such as glaucoma, macular degeneration and cataracts.    Personalized Prevention Plan  You are due for the preventive services outlined below.  Your care team is available to assist you in scheduling these services.  If you have already completed any of these items, please share that information with your care team to update in your medical record.  Health Maintenance   Topic Date Due     ANNUAL REVIEW OF HM ORDERS  Never done     COVID-19 Vaccine (1) Never done     ZOSTER IMMUNIZATION (1 of 2) Never done      LUNG CANCER SCREENING  Never done     Pneumococcal Vaccine: 65+ Years (2 of 2 - PPSV23) 05/06/2014     EYE EXAM  08/03/2016     DTAP/TDAP/TD IMMUNIZATION (4 - Td or Tdap) 05/06/2019     A1C  07/08/2021     INFLUENZA VACCINE (1) Never done     BMP  01/08/2022     LIPID  01/08/2022     MICROALBUMIN  01/08/2022     DIABETIC FOOT EXAM  01/08/2022     FALL RISK ASSESSMENT  01/08/2022     MEDICARE ANNUAL WELLNESS VISIT  01/14/2023     COLORECTAL CANCER SCREENING  01/12/2027     ADVANCE CARE PLANNING  01/14/2027     HEPATITIS C SCREENING  Completed     PHQ-2  Completed     AORTIC ANEURYSM SCREENING (SYSTEM ASSIGNED)  Completed     IPV IMMUNIZATION  Aged Out     MENINGITIS IMMUNIZATION  Aged Out      Patient Education   Alcohol Use     Many people can enjoy a glass of wine or beer without any negative consequences to their health. According to the Centers for Disease Control and Prevention (CDC), having one or fewer drinks per day for women and two or fewer per day for men is considered moderate drinking.     When people drink more than moderately, it can become concerning. Excessive drinking is defined as consuming 15 drinks or more per week for men and 8 drinks or more per week for women. There are various health problems associated with excessive drinking, which include:    Damage to vital organs like the heart, brain, liver and pancreas    Harm to the digestive tract    Weaken the immune system    Higher risk for heart disease and cancer    There are many resources available to people who are addicted to alcohol. A counselor or other health care provider can give you support. So can a , , or rabbi who is trained in substance abuse counseling. Friends and family may also help once you are connected with professionals.           Preventing Falls at Home  A person can fall for many reasons. Older adults may fall because reaction time slows down as we age. Your muscles and joints may get stiff, weak, or less  flexible because of illness, medicines, or a physical condition.   Other health problems that make falls more likely include:     Arthritis    Dizziness or lightheadedness when you stand up (orthostatic hypotension)    History of a stroke    Dizziness    Anemia    Certain medicines taken for mental illness or to control blood pressure.    Problems with balance or gait    Bladder or urinary problems    History of falling    Changes in vision (vision impairment)    Changes in thinking skills and memory (cognitive impairment)  Injuries from a fall can include serious injuries such as broken bones, dislocated joints, internal bleeding and cuts. Injuries like these can limit your independence.   Prevention tips  To help prevent falls and fall-related injuries, follow the tips below.    Floors  To make floors safer:     Put nonskid pads under area rugs.    Remove small rugs.    Replace worn floor coverings.    Tack carpets firmly to each step on carpeted stairs. Put nonskid strips on the edges of uncarpeted stairs.    Keep floors and stairs free of clutter and cords.    Arrange furniture so there are clear pathways.    Clean up any spills right away.  Bathrooms    To make bathrooms safer:     Install grab bars in the tub or shower.    Apply nonskid strips or put a nonskid rubber mat in the tub or shower.    Sit on a bath chair to bathe.    Use bathmats with nonskid backing.  Lighting  To improve visibility in your home:      Keep a flashlight in each room. Or put a lamp next to the bed within easy reach.    Put nightlights in the bedrooms, hallways, kitchen, and bathrooms.    Make sure all stairways have good lighting.    Take your time when going up and down stairs.    Put handrails on both sides of stairs and in walkways for more support. To prevent injury to your wrist or arm, don t use handrails to pull yourself up.    Install grab bars to pull yourself up.    Move or rearrange items that you use often. This will make  them easier to find or reach.    Look at your home to find any safety hazards. Especially look at doorways, walkways, and the driveway. Remove or repair any safety problems that you find.  Other changes to make    Look around to find any safety hazards. Look closely at doorways, walkways, and the driveway. Remove or repair any safety problems that you find.    Wear shoes that fit well.    Take your time when going up and down stairs.    Put handrails on both sides of stairs and in walkways for more support. To prevent injury to your wrist or arm, don t use handrails to pull yourself up.    Install grab bars wherever needed to pull yourself up.    Arrange items that you use often. This will make them easier to find or reach.    Nitesh last reviewed this educational content on 3/1/2020    1589-9816 [employer]. All rights reserved. This information is not intended as a substitute for professional medical care. Always follow your healthcare professional's instructions.

## 2022-01-14 NOTE — PROGRESS NOTES
SUBJECTIVE:   Andrew Torres is a 71 year old male who presents for Preventive Visit.      Patient has been advised of split billing requirements and indicates understanding: Yes   Are you in the first 12 months of your Medicare coverage?  No    HPI  Do you feel safe in your environment? Yes    Have you ever done Advance Care Planning? (For example, a Health Directive, POLST, or a discussion with a medical provider or your loved ones about your wishes): No, advance care planning information given to patient to review.  Patient declined advance care planning discussion at this time.    He's been having a back ache. Started a couple weeks ago. He did fall on the ice then one week later he developed back pain. No radiating pain. No numbness/tingling. Pain is midline back. Not taking anything for pain.    Urinary frequency/OAB - he has been on oxybutynin  but hasn't noticed much improvement.  Has a lot of urgency and sometimes incontinence due to this. Endorses urinary frequency. No dysuria. Is having more occasions where he isn't able to make it to the restroom. No hematuria. He previously saw Urology, last 2 years ago.     On lipitor 20mg - denies myalgias. Allergies in chart does mention this as a previous adverse effect, he isn't aware of this.    Not checking blood sugar. Denies numbness/tingling in feet. No lightheadedness. No headaches. Not on medication for diabetes.     Due for eye exam - goes to [facility] in [place].     Having some chest pain with exertion. None at rest. Hasn't seen cardiology in years. Declines referral or work up.     Former smoker, last in 1986. Not a heavy smoker. 8-10 years.      Fall risk  Fallen 2 or more times in the past year?: No  Any fall with injury in the past year?: Yes (fell on ice)    Cognitive Screening   1) Repeat 3 items (Leader, Season, Table)    2) Clock draw: NORMAL  3) 3 item recall: Recalls 1 object   Results: NORMAL clock, 1-2 items recalled: COGNITIVE  IMPAIRMENT LESS LIKELY    Mini-CogTM Copyright SID Sellers. Licensed by the author for use in [facility]; reprinted with permission ([email or web address]). All rights reserved.      Do you have sleep apnea, excessive snoring or daytime drowsiness?: yes    Reviewed and updated as needed this visit by clinical staff  Tobacco  Allergies  Meds  Problems            Reviewed and updated as needed this visit by Provider               Social History     Tobacco Use     Smoking status: Former Smoker     Smokeless tobacco: Never Used     Tobacco comment: Quit in the 80's 86?   Substance Use Topics     Alcohol use: Yes     Comment: 4 drinks per week         Alcohol Use 1/14/2022   Prescreen: >3 drinks/day or >7 drinks/week? Yes   Prescreen: >3 drinks/day or >7 drinks/week? -   AUDIT SCORE  6     AUDIT - Alcohol Use Disorders Identification Test - Reproduced from the World Health Organization Audit 2001 (Second Edition) 1/14/2022   1.  How often do you have a drink containing alcohol? 2 to 4 times a month   2.  How many drinks containing alcohol do you have on a typical day when you are drinking? 3 or 4   3.  How often do you have five or more drinks on one occasion? Weekly   4.  How often during the last year have you found that you were not able to stop drinking once you had started? Never   5.  How often during the last year have you failed to do what was normally expected of you because of drinking? Never   6.  How often during the last year have you needed a first drink in the morning to get yourself going after a heavy drinking session? Never   7.  How often during the last year have you had a feeling of guilt or remorse after drinking? Never   8.  How often during the last year have you been unable to remember what happened the night before because of your drinking? Never   9.  Have you or someone else been injured because of your drinking? No   10. Has a relative, friend, doctor or other health care worker been  "concerned about your drinking or suggested you cut down? No   TOTAL SCORE 6       Current providers sharing in care for this patient include:   Patient Care Team:  [facility] as PCP - General (Clinic)  Bravo Washington MD as MD (Cardiology)  Nate Mejia MD as MD (Surgery)  Katty Quevedo MD as Assigned Heart and Vascular Provider  Gerard Negron DO as Assigned PCP    The following health maintenance items are reviewed in Epic and correct as of today:  Health Maintenance Due   Topic Date Due     PHQ-9  Never done     COVID-19 Vaccine (1) Never done     ZOSTER IMMUNIZATION (1 of 2) Never done     LUNG CANCER SCREENING  Never done     Pneumococcal Vaccine: 65+ Years (2 of 2 - PPSV23) 05/06/2014     EYE EXAM  08/03/2016     DTAP/TDAP/TD IMMUNIZATION (4 - Td or Tdap) 05/06/2019     INFLUENZA VACCINE (1) Never done     BMP  01/08/2022     LIPID  01/08/2022     MICROALBUMIN  01/08/2022     FALL RISK ASSESSMENT  01/08/2022     Lab work is in process  Pneumonia Vaccine: pt declined        Review of Systems  Constitutional, HEENT, cardiovascular, pulmonary, gi and gu systems are negative, except as otherwise noted.    OBJECTIVE:   /78   Pulse 56   Temp (!) 96.4  F (35.8  C) (Tympanic)   Ht 1.74 m (5' 8.5\")   Wt 108 kg (238 lb)   SpO2 100%   BMI 35.66 kg/m   Estimated body mass index is 35.66 kg/m  as calculated from the following:    Height as of this encounter: 1.74 m (5' 8.5\").    Weight as of this encounter: 108 kg (238 lb).  Physical Exam  GENERAL: healthy, alert and no distress  EYES: Eyes grossly normal to inspection, PERRL and conjunctivae and sclerae normal  HENT: ear canals and TM's normal, nose and mouth without ulcers or lesions  NECK: no adenopathy, no asymmetry, masses, or scars and thyroid normal to palpation  RESP: lungs clear to auscultation - no rales, rhonchi or wheezes  CV: regular rate and rhythm, normal S1 S2, no S3 or S4, no murmur, click or rub, " 1+ pitting peripheral edema and peripheral pulses strong  ABDOMEN: soft, nontender, no hepatosplenomegaly, no masses and bowel sounds normal  MS: discrete area of lumbar spinous process tenderness at approx L4-5 and also right paraspinal tenderness. no gross musculoskeletal defects noted, no edema  SKIN: no suspicious lesions or rashes  NEURO: Normal strength and tone, mentation intact and speech normal  PSYCH: mentation appears normal, affect normal/bright  Foot exam: DP palpable, monofilament testing intact    Diagnostic Test Results:  Labs reviewed in Epic    ASSESSMENT / PLAN:   (Z00.00) Encounter for Medicare annual wellness exam  (primary encounter diagnosis)  Comment: normal vitals. He declined all vaccinations today. He does not qualify for lung cancer screening based on time since last smoked.   Plan: REVIEW OF HEALTH MAINTENANCE PROTOCOL ORDERS            (R35.0) Urinary frequency  (N32.81) Overactive bladder  Comment: increasing symptoms of OAB despite oxybutynin 10mg. Per chart review, he did see urology for this 2 years ago with plan to follow up. Patient is unaware of this. Discussed that we could increase his oxybutynin to 15mg vs keeping it the same and adding myrbetriq. He just had a refill of the 10mg so he prefers to stay at this dose. Declined Myrbetriq. He is open to a referral back to urology.   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 Has been well controlled off of medications   Plan: HEMOGLOBIN A1C, Albumin Random Urine         Quantitative with Creat Ratio, FOOT EXAM            (E66.01) Morbid obesity (H)  Comment: encourage increased activities as tolerated. Improve diet, recommend weight loss     (E78.5) Hyperlipidemia LDL goal <70  Comment: due to hx of PAD and CAD, discussed increasing his statin to high-intensity. On his allergies it mentions myalgias, but he doesn't recall this and he  denies any current myalgias on lipitor 20mg. We will increase him to 40mg lipitor. Check lipid panel today, goal LDL <70  Plan: Lipid panel reflex to direct LDL Fasting,         atorvastatin (LIPITOR) 40 MG tablet          (Z95.1) S/P CABG (coronary artery bypass graft)  (I25.810) Coronary artery disease involving coronary bypass graft of native heart without angina pectoris  Comment: has not followed with cardiology for many years. He does endorse some occasional chest pain with exertion. Recommend based on his history, workup with either imaging or referral to cardiology. He declines both at this time. Stressed that if these symptoms persist or increase he needs to be evaluated right away    (I65.29) Occlusion of carotid artery, unspecified laterality  Comment: reviewed vascular surgery notes. He doesn't recall them ordering a CTA. Gave him instructions again today on how to schedule this and reviewed that records show he missed calls to schedule follow up with vascular - instructed him to call to scheduled CTA and f/up with surgeon. On review of records, it doesn't appear he has had AAA screen either - recommend this at follow up visit*    (N40.0) Benign prostatic hyperplasia, unspecified whether lower urinary tract symptoms present  Comment: denies concerns with dribbling,nocturia, weak flow today. Declines PSA testing. Concerns with OAB. Referred to urology    (Z86.73) History of CVA (cerebrovascular accident)  Comment: uses a cane. Continue ASA and statin.     (I10) Essential hypertension with goal blood pressure less than 140/90  Comment: chronic, stable, BP well controlled on repeat testing today  Plan: BASIC METABOLIC PANEL            (W00.9XXA) Fall due to slipping on ice or snow, initial encounter  Comment: he does have midline lumbar back pain and pain to the right paraspinal region. Discrete pain s/p fall. Concern for fracture/compression fracture. Will obtain XR lumbar spine. Recommend using salon pas  "or lidocaine patches on back and heating pads for pain.   Plan: XR Lumbar Spine 2/3 Views            (M54.50) Acute midline low back pain without sciatica  Plan: XR Lumbar Spine 2/3 Views              Patient has been advised of split billing requirements and indicates understanding: Yes  COUNSELING:  Reviewed preventive health counseling, as reflected in patient instructions       Consider AAA screening for ages 65-75 and smoking history       Regular exercise       Healthy diet/nutrition       Bladder control       Fall risk prevention       Prostate cancer screening    Estimated body mass index is 35.66 kg/m  as calculated from the following:    Height as of this encounter: 1.74 m (5' 8.5\").    Weight as of this encounter: 108 kg (238 lb).    Weight management plan: Discussed healthy diet and exercise guidelines    He reports that he has quit smoking. He has never used smokeless tobacco.      Appropriate preventive services were discussed with this patient, including applicable screening as appropriate for cardiovascular disease, diabetes, osteopenia/osteoporosis, and glaucoma.  As appropriate for age/gender, discussed screening for colorectal cancer, prostate cancer, breast cancer, and cervical cancer. Checklist reviewing preventive services available has been given to the patient.    Reviewed patients plan of care and provided an AVS. The Basic Care Plan (routine screening as documented in Health Maintenance) for Andrew meets the Care Plan requirement. This Care Plan has been established and reviewed with the Patient.    Counseling Resources:  ATP IV Guidelines  Pooled Cohorts Equation Calculator  Breast Cancer Risk Calculator  Breast Cancer: Medication to Reduce Risk  FRAX Risk Assessment  ICSI Preventive Guidelines  Dietary Guidelines for Americans, 2010  USDA's MyPlate  ASA Prophylaxis  Lung CA Screening    Safia Morin DO  [facility]    Identified Health Risks:    The patient " reports that he drinks more than one alcoholic drink per day but denies binge or excessive drinking. He was counseled and given information about possible harmful effects of excessive alcohol intake.  He is at risk for falling and has been provided with information to reduce the risk of falling at home.  Answers for HPI/ROS submitted by the patient on 1/14/2022  If you checked off any problems, how difficult have these problems made it for you to do your work, take care of things at home, or get along with other people?: Not difficult at all  PHQ9 TOTAL SCORE: 16

## 2022-01-15 LAB
ANION GAP SERPL CALCULATED.3IONS-SCNC: 7 MMOL/L (ref 3–14)
BUN SERPL-MCNC: 18 MG/DL (ref 7–30)
CALCIUM SERPL-MCNC: 9 MG/DL (ref 8.5–10.1)
CHLORIDE BLD-SCNC: 108 MMOL/L (ref 94–109)
CHOLEST SERPL-MCNC: 147 MG/DL
CO2 SERPL-SCNC: 27 MMOL/L (ref 20–32)
CREAT SERPL-MCNC: 1.02 MG/DL (ref 0.66–1.25)
CREAT UR-MCNC: 190 MG/DL
FASTING STATUS PATIENT QL REPORTED: NORMAL
GFR SERPL CREATININE-BSD FRML MDRD: 78 ML/MIN/1.73M2
GLUCOSE BLD-MCNC: 147 MG/DL (ref 70–99)
HDLC SERPL-MCNC: 47 MG/DL
LDLC SERPL CALC-MCNC: 77 MG/DL
MICROALBUMIN UR-MCNC: 34 MG/L
MICROALBUMIN/CREAT UR: 17.89 MG/G CR (ref 0–17)
NONHDLC SERPL-MCNC: 100 MG/DL
POTASSIUM BLD-SCNC: 4 MMOL/L (ref 3.4–5.3)
SODIUM SERPL-SCNC: 142 MMOL/L (ref 133–144)
TRIGL SERPL-MCNC: 117 MG/DL

## 2022-01-15 ASSESSMENT — PATIENT HEALTH QUESTIONNAIRE - PHQ9: SUM OF ALL RESPONSES TO PHQ QUESTIONS 1-9: 16

## 2022-01-17 ENCOUNTER — TELEPHONE (OUTPATIENT)
Dept: FAMILY MEDICINE | Facility: CLINIC | Age: 72
End: 2022-01-17
Payer: COMMERCIAL

## 2022-01-17 NOTE — TELEPHONE ENCOUNTER
----- Message from Safia Morin DO sent at 1/16/2022  7:04 PM CST -----  Please let patient know his diabetes looks great - A1c is 6.4%. no new medications for that. His cholesterol has improved a lot. His LDL cholesterol is almost at goal (<70). Start the higher dose statin as we discussed at his visit. Kidneys are in a normal range.     Thanks,   Safia Morin DO

## 2022-01-17 NOTE — LETTER
Andrew Torres  [address] [facility] [address] [facility] [address]    1/18/2022        Dear Andrew    I am writing you in regards to your recent lab results obtained at the clinic.     Your diabetes looks great - A1c is 6.4%. No new medications for that. Your cholesterol has improved a lot. Your LDL (bad cholesterol) is almost at goal (<70). Start the higher dose statin (40mg atorvastatin) as we discussed at your visit. Kidneys are in a normal range.   Component      Latest Ref Rng & Units 1/14/2022   Sodium      133 - 144 mmol/L 142   Potassium      3.4 - 5.3 mmol/L 4.0   Chloride      94 - 109 mmol/L 108   Carbon Dioxide      20 - 32 mmol/L 27   Anion Gap      3 - 14 mmol/L 7   Urea Nitrogen      7 - 30 mg/dL 18   Creatinine      0.66 - 1.25 mg/dL 1.02   Calcium      8.5 - 10.1 mg/dL 9.0   Glucose      70 - 99 mg/dL 147 (H)   GFR Estimate      >60 mL/min/1.73m2 78   Cholesterol      <200 mg/dL 147   Triglycerides      <150 mg/dL 117   HDL Cholesterol      >=40 mg/dL 47   LDL Cholesterol Calculated      <=100 mg/dL 77   Non HDL Cholesterol      <130 mg/dL 100   Patient Fasting > 8hrs?       Unknown   Creatinine Urine      mg/dL 190   Albumin Urine mg/L      mg/L 34   Albumin Urine mg/g Cr      0.00 - 17.00 mg/g Cr 17.89 (H)   Hemoglobin A1C      0.0 - 5.6 % 6.4 (H)       Sincerely,    Dr. Safia Morin  [address] [address] [phone]

## 2022-01-17 NOTE — TELEPHONE ENCOUNTER
RN called patient at [phone].    No VM set up so RN unable to leave message.    Will try to call back later.  Domo Buchanan, RN, BSN, PHN  [facility]

## 2022-03-10 DIAGNOSIS — I25.810 CORONARY ARTERY DISEASE INVOLVING CORONARY BYPASS GRAFT OF NATIVE HEART WITHOUT ANGINA PECTORIS: ICD-10-CM

## 2022-03-10 DIAGNOSIS — Z86.73 HISTORY OF CVA (CEREBROVASCULAR ACCIDENT): ICD-10-CM

## 2022-03-10 NOTE — TELEPHONE ENCOUNTER
[id number] faxed a Refill Request: clopidogrel (PLAVIX) 75 MG tablet    Notes to Prescriber: Request for refills, please advice.  Pt is running low and will be out soon.

## 2022-03-11 RX ORDER — CLOPIDOGREL BISULFATE 75 MG/1
75 TABLET ORAL DAILY
Qty: 90 TABLET | Refills: 0 | Status: SHIPPED | OUTPATIENT
Start: 2022-03-11 | End: 2022-11-23

## 2022-03-11 NOTE — TELEPHONE ENCOUNTER
Routing refill request to provider for review/approval because:   Plavix Failed 03/10/2022 07:45 AM   Protocol Details  Normal HGB on file in past 12 months    Normal Platelets on file in past 12 months      Pt running low      Elisabet Aranda RN

## 2022-04-26 ENCOUNTER — ALLIED HEALTH/NURSE VISIT (OUTPATIENT)
Dept: FAMILY MEDICINE | Facility: CLINIC | Age: 72
End: 2022-04-26
Payer: COMMERCIAL

## 2022-04-26 VITALS — SYSTOLIC BLOOD PRESSURE: 134 MMHG | HEART RATE: 78 BPM | DIASTOLIC BLOOD PRESSURE: 73 MMHG

## 2022-04-26 DIAGNOSIS — Z01.30 BP CHECK: Primary | ICD-10-CM

## 2022-04-26 PROCEDURE — 99207 PR NO CHARGE NURSE ONLY: CPT

## 2022-04-26 NOTE — PROGRESS NOTES
Andrew Torres was evaluated at [facility] on April 26, 2022 at which time his blood pressure was:    BP Readings from Last 3 Encounters:   04/26/22 134/73   01/14/22 138/78   11/02/21 134/78     Pulse Readings from Last 3 Encounters:   04/26/22 78   01/14/22 56   11/02/21 65       Reviewed lifestyle modifications for blood pressure control and reduction: including making healthy food choices, managing weight, getting regular exercise, smoking cessation, reducing alcohol consumption, monitoring blood pressure regularly.  Recommended see provider if still having symptoms.    Symptoms: Dizziness and Lightheadedness    BP Goal:< 140/90 mmHg    BP Assessment:  BP at goal    Potential Reasons for BP too high: NA - Not applicable    BP Follow-Up Plan: Recheck BP in 6 months at pharmacy    Recommendation to Provider: No additional recommendation.    Note completed by: Narcisa Nelson, PharmD

## 2022-05-10 ENCOUNTER — VIRTUAL VISIT (OUTPATIENT)
Dept: FAMILY MEDICINE | Facility: CLINIC | Age: 72
End: 2022-05-10
Payer: COMMERCIAL

## 2022-05-10 DIAGNOSIS — U07.1 INFECTION DUE TO 2019 NOVEL CORONAVIRUS: Primary | ICD-10-CM

## 2022-05-10 DIAGNOSIS — I70.212 ATHEROSCLEROSIS OF NATIVE ARTERIES OF EXTREMITIES WITH INTERMITTENT CLAUDICATION, LEFT LEG (H): ICD-10-CM

## 2022-05-10 DIAGNOSIS — I69.354 HEMIPLEGIA AND HEMIPARESIS FOLLOWING CEREBRAL INFARCTION AFFECTING LEFT NON-DOMINANT SIDE (H): ICD-10-CM

## 2022-05-10 PROCEDURE — 99213 OFFICE O/P EST LOW 20 MIN: CPT | Mod: 95 | Performed by: FAMILY MEDICINE

## 2022-05-10 NOTE — PROGRESS NOTES
Bhavin is a 72 year old who is being evaluated via a billable telephone visit.      What phone number would you like to be contacted at? [phone]  How would you like to obtain your AVS? Mail a copy    ASSESSMENT:    Infection due to 2019 novel coronavirus  After some discussion pt agrees to do monoclonal antibodies    Closest appt in in [facility]    Submitted to [facility] they will arrange appt  Told pt it must be within the next couple days to be effective    Hemiplegia and hemiparesis following cerebral infarction affecting left non-dominant side (H)  Risk factors for severe illness/death    Atherosclerosis of native arteries of extremities with intermittent claudication, left leg (H)  Risk factors for severe illness/death      Subjective   Bhavin is a 72 year old who presents for the following health issues     HPI       COVID-19 Symptom Review  How many days ago did these symptoms start? 05/05/22  Went to ER, is starting to feel a little better     Are any of the following symptoms significant for you?    New or worsening difficulty breathing? No    Worsening cough? No    Fever or chills? No    Headache: no    Sore throat: no    Chest pain: no    Diarrhea: no    Body aches yes, some in back     What treatments has patient tried? Guaifenesin (mucinex)   Does patient live in a nursing home, group home, or shelter? no  Does patient have a way to get food/medications during quarantined? Yes, I have a friend or family member who can help me.              Patient Active Problem List   Diagnosis     CAD (coronary artery disease) stent placed 2006- life long plavix recommended     Carotid stenosis stent placed right 2010     Advanced directives, counseling/discussion     Coronary artery disease involving coronary bypass graft of native heart without angina pectoris     Gastroesophageal reflux disease without esophagitis     Essential hypertension with goal blood pressure less than 140/90     Hyperlipidemia LDL goal <70      Uric acid kidney stone     History of CVA (cerebrovascular accident)     Left hemiparesis (H)     PAD (peripheral artery disease) (H)-      History of carotid endarterectomy     Benign neoplasm of skin     Carotid artery occlusion     Acute MI, inferior wall (H)     Former light tobacco smoker     Hemorrhoids without complication     Intermittent claudication (H)     S/P CABG (coronary artery bypass graft)     Tinnitus     Morbid obesity (H)     BPH (benign prostatic hyperplasia)     Past Surgical History:   Procedure Laterality Date     AS CABG, VEIN, TWO      CABG x 2; GSV harvested from left leg; done at [facility]     CAROTID STENT Right      TONSILLECTOMY         Social History     Tobacco Use     Smoking status: Former Smoker     Smokeless tobacco: Never Used     Tobacco comment: Quit in the 80's 86?   Substance Use Topics     Alcohol use: Yes     Comment: 4 drinks per week     Family History   Problem Relation Age of Onset     Hypertension Mother      Hypertension Father      Heart Disease Paternal Grandmother      Cancer Sister         stomach?      Parkinsonism Brother          Allergies, reviewed:     Allergies   Allergen Reactions     Sulfa Drugs Hives     Hmg-Coa-R Inhibitors Muscle Pain (Myalgia)       Current Outpatient Medications   Medication Sig Dispense Refill     amLODIPine (NORVASC) 10 MG tablet Take 1 tablet by mouth once daily 90 tablet 3     ASPIRIN 81 PO        atorvastatin (LIPITOR) 40 MG tablet Take 1 tablet (40 mg) by mouth daily 90 tablet 3     carvedilol (COREG) 25 MG tablet Take 1 tablet (25 mg) by mouth 2 times daily (with meals) 180 tablet 1     clopidogrel (PLAVIX) 75 MG tablet Take 1 tablet (75 mg) by mouth daily 90 tablet 0     Cyanocobalamin (B-12) 1000 MCG CAPS Take  by mouth.       Fexofenadine HCl (MUCINEX ALLERGY PO)        lisinopril (ZESTRIL) 10 MG tablet Take 1 tablet by mouth twice daily 180 tablet 3     multivitamin, therapeutic with minerals (THERA-VIT-M) TABS  "tablet Take 1 tablet by mouth once daily 30 tablet 0     nitroGLYcerin (NITROSTAT) 0.4 MG sublingual tablet 1 tablet now 2 tablet 0     oxybutynin ER (DITROPAN-XL) 10 MG 24 hr tablet TAKE ONE TABLET BY MOUTH AT BEDTIME 90 tablet 0           Review of Systems         Objective    Vitals - Patient Reported  Weight (Patient Reported): 106.6 kg (235 lb)  Height (Patient Reported): 174 cm (5' 8.5\")  BMI (Based on Pt Reported Ht/Wt): 35.21  Pain Score: No Pain (0)        Physical Exam   mild distress  PSYCH: Alert and oriented times 3; coherent speech, normal   rate and volume, able to articulate logical thoughts, able   to abstract reason, no tangential thoughts, no hallucinations   or delusions  His affect is normal  RESP: No cough, no audible wheezing, able to talk in full sentences  Remainder of exam unable to be completed due to telephone visits                Phone call duration: 21 minutes  "

## 2022-05-10 NOTE — PROGRESS NOTES
Called patient at request of Provider.  Encouraged pt to get monoclonal antibodies as his risk for serious complication from COVID is high.  Unfortunately pt will not be able to come into [facility] due to his [facility] covid status but can call at any time if he has questions or concerns.      Elisabet Aranda RN

## 2022-05-11 ENCOUNTER — TELEPHONE (OUTPATIENT)
Dept: FAMILY MEDICINE | Facility: CLINIC | Age: 72
End: 2022-05-11
Payer: COMMERCIAL

## 2022-05-11 NOTE — TELEPHONE ENCOUNTER
RN received call from patient.    Patient asking if Monoclonial antibodies are approved or experimental.    RN advised antibodies are approved to treat Covid 19 infection.    Patient stated he needs to drive to [place] today to receive them.    RN advised the [facility] manages the antibodies anfd they arrange appointments.      RN advised patient he is at serious risk for complications from Covid so antibodies are encouraged.  RN also advised patient should receive them in the next several days for them to be most effective.    Patient verbalized understanding.  Patient stay he may call the number he has to further discuss.  RN again encouraged patient he should have the infusion    Domo Buchanan RN, BSN, PHN  [facility]

## 2022-05-16 ENCOUNTER — NURSE TRIAGE (OUTPATIENT)
Dept: NURSING | Facility: CLINIC | Age: 72
End: 2022-05-16
Payer: COMMERCIAL

## 2022-05-16 NOTE — TELEPHONE ENCOUNTER
"Wife received Consent.  From .to speak for him.    Wife state he has COVID, and reports  received Monoclonil Antibodies, and she is asking if they need to make Follow up appointments.  She was advised if symptoms come back or if they are not getting better, she should  Make an appointment to come in.    Sanjuanita Coley RN   [facility] Nurse Advisor    COVID 19 Nurse Triage Plan/Patient Instructions    Please be aware that novel coronavirus (COVID-19) may be circulating in the community. If you develop symptoms such as fever, cough, or SOB or if you have concerns about the presence of another infection including coronavirus (COVID-19), please contact your health care provider or visit [email or web address].     Disposition/Instructions    Home care recommended. Follow home care protocol based instructions.  Additional COVID19 information to add for patients.   How can I protect others?  If you have symptoms (fever, cough, body aches or trouble breathing): Stay home and away from others (self-isolate) until:    At least 10 days have passed since your symptoms started, And     You ve had no fever--and no medicine that reduces fever--for 1 full day (24 hours), And      Your other symptoms have resolved (gotten better).     If you don t have symptoms, but a test showed that you have COVID-19 (you tested positive):    Stay home and away from others (self-isolate). Follow the tips under \"How do I self-isolate?\" below for 10 days (20 days if you have a weak immune system).    You don't need to be retested for COVID-19 before going back to school or work. As long as you're fever-free and feeling better, you can go back to school, work and other activities after waiting the 10 or 20 days.     How do I self-isolate?    Stay in your own room, even for meals. Use your own bathroom if you can.     Stay away from others in your home. No hugging, kissing or shaking hands. No visitors.    Don t go to " work, school or anywhere else.     Clean  high touch  surfaces often (doorknobs, counters, handles, etc.). Use a household cleaning spray or wipes. You ll find a full list on the EPA website:  www.epa.gov/pesticide-registration/list-n-disinfectants-use-against-sars-cov-2.    Cover your mouth and nose with a mask, tissue or washcloth to avoid spreading germs.    Wash your hands and face often. Use soap and water.    Caregivers in these groups are at risk for severe illness due to COVID-19:  o People 65 years and older  o People who live in a nursing home or long-term care facility  o People with chronic disease (lung, heart, cancer, diabetes, kidney, liver, immunologic)  o People who have a weakened immune system, including those who:  - Are in cancer treatment  - Take medicine that weakens the immune system, such as corticosteroids  - Had a bone marrow or organ transplant  - Have an immune deficiency  - Have poorly controlled HIV or AIDS  - Are obese (body mass index of 40 or higher)  - Smoke regularly    Caregivers should wear gloves while washing dishes, handling laundry and cleaning bedrooms and bathrooms.    Use caution when washing and drying laundry: Don t shake dirty laundry, and use the warmest water setting that you can.    For more tips, go to www.cdc.gov/coronavirus/2019-ncov/downloads/10Things.pdf.    How can I take care of myself?  1. Get lots of rest. Drink extra fluids (unless a doctor has told you not to).     2. Take Tylenol (acetaminophen) for fever or pain. If you have liver or kidney problems, ask your family doctor if it s okay to take Tylenol.     Adults can take either:     650 mg (two 325 mg pills) every 4 to 6 hours, or     1,000 mg (two 500 mg pills) every 8 hours as needed.     Note: Don t take more than 3,000 mg in one day.   Acetaminophen is found in many medicines (both prescribed and over-the-counter medicines). Read all labels to be sure you don t take too much.     For children,  check the Tylenol bottle for the right dose. The dose is based on the child s age or weight.    3. If you have other health problems (like cancer, heart failure, an organ transplant or severe kidney disease): Call your specialty clinic if you don t feel better in the next 2 days.    4. Know when to call 911: Emergency warning signs include:    Trouble breathing or shortness of breath    Pain or pressure in the chest that doesn t go away    Feeling confused like you haven t felt before, or not being able to wake up    Bluish-colored lips or face    What are the symptoms of COVID-19?     The most common symptoms are cough, fever and trouble breathing.     Less common symptoms include body aches, chills, diarrhea (loose, watery poops), fatigue (feeling very tired), headache, runny nose, sore throat and loss of smell.    COVID-19 can cause severe coughing (bronchitis) and lung infection (pneumonia).    How does it spread?     The virus may spread when a person coughs or sneezes into the air. The virus can travel about 6 feet this way, and it can live on surfaces.      Common  (household disinfectants) will kill the virus.    Who is at risk?  Anyone can catch COVID-19 if they re around someone who has the virus.    How can others protect themselves?     Stay away from people who have COVID-19 (or symptoms of COVID-19).    Wash hands often with soap and water. Or, use hand  with at least 60% alcohol.    Avoid touching the eyes, nose or mouth.     Wear a face mask when you go out in public, when sick or when caring for a sick person.    Where can I get more information?     [facility]: About COVID-19: [email or web address]    CDC: What to Do If You re Sick: www.cdc.gov/coronavirus/2019-ncov/about/steps-when-sick.html    CDC: Ending Home Isolation: www.cdc.gov/coronavirus/2019-ncov/hcp/disposition-in-home-patients.html     CDC: Caring for Someone:  www.cdc.gov/coronavirus/2019-ncov/if-you-are-sick/care-for-someone.html     [facility]: Interim Guidance for Hospital Discharge to Home: www.[facility]/diseases/coronavirus/hcp/hospdischarge.pdf    [facility] clinical trials (COVID-19 research studies): clinicalaffairs.[facility]/[employer]-clinical-trials     Below are the COVID-19 hotlines at the [place] [facility] ([facility]). Interpreters are available.   o For health questions: Call 289-271-2550 or 1-560.389.3186 (7 a.m. to 7 p.m.)  o For questions about schools and childcare: Call 050-360-2151 or 1-343.848.4542 (7 a.m. to 7 p.m.)          Thank you for taking steps to prevent the spread of this virus.  o Limit your contact with others.  o Wear a simple mask to cover your cough.  o Wash your hands well and often.    Resources    [facility]: About COVID-19: [email or web address]    CDC: What to Do If You're Sick: www.cdc.gov/coronavirus/2019-ncov/about/steps-when-sick.html    CDC: Ending Home Isolation: www.cdc.gov/coronavirus/2019-ncov/hcp/disposition-in-home-patients.html     CDC: Caring for Someone: www.cdc.gov/coronavirus/2019-ncov/if-you-are-sick/care-for-someone.html     [facility]: Interim Guidance for Hospital Discharge to Home: www.[facility]./diseases/coronavirus/hcp/hospdischarge.pdf    [facility] clinical trials (COVID-19 research studies): clinicalaffairs.[facility]/[employer]-clinical-trials     Below are the COVID-19 hotlines at the [place] [facility] ([facility]). Interpreters are available.   o For health questions: Call 513-996-3581 or 1-122.974.8516 (7 a.m. to 7 p.m.)  o For questions about schools and childcare: Call 766-929-1922 or 1-922.796.3988 (7 a.m. to 7 p.m.)

## 2022-05-16 NOTE — TELEPHONE ENCOUNTER
Reason for Disposition    [1] COVID-19 diagnosed by positive lab test (e.g., PCR, rapid self-test kit) AND [2] mild symptoms (e.g., cough, fever, others) AND [3] no complications or SOB    Additional Information    Negative: [1] COVID-19 diagnosed by positive lab test (e.g., PCR, rapid self-test kit) AND [2] NO symptoms (e.g., cough, fever, others)    Negative: [1] COVID-19 infection suspected by caller or triager AND [2] mild symptoms (cough, fever, or others) AND [3] negative COVID-19 rapid test    Negative: Fever present > 3 days (72 hours)    Negative: [1] Fever returns after gone for over 24 hours AND [2] symptoms worse or not improved    Negative: [1] Continuous (nonstop) coughing interferes with work or school AND [2] no improvement using cough treatment per Care Advice    Negative: Cough present > 3 weeks    Negative: MILD difficulty breathing (e.g., minimal/no SOB at rest, SOB with walking, pulse <100)    Negative: Fever > 103 F (39.4 C)    Negative: [1] Fever > 101 F (38.3 C) AND [2] over 60 years of age    Negative: [1] Fever > 100.0 F (37.8 C) AND [2] bedridden (e.g., nursing home patient, CVA, chronic illness, recovering from surgery)    Negative: HIGH RISK for severe COVID complications (e.g., weak immune system, age > 64 years, obesity with BMI > 25, pregnant, chronic lung disease or other chronic medical condition) (Exception: Already seen by PCP and no new or worsening symptoms.)    Negative: [1] HIGH RISK patient AND [2] influenza is widespread in the community AND [3] ONE OR MORE respiratory symptoms: cough, sore throat, runny or stuffy nose    Negative: [1] HIGH RISK patient AND [2] influenza exposure within the last 7 days AND [3] ONE OR MORE respiratory symptoms: cough, sore throat, runny or stuffy nose    Negative: Oxygen level (e.g., pulse oximetry) 91 to 94 percent    Negative: Chest pain or pressure    Negative: Patient sounds very sick or weak to the triager    Negative: SEVERE or  constant chest pain or pressure  (Exception: Mild central chest pain, present only when coughing.)    Negative: MODERATE difficulty breathing (e.g., speaks in phrases, SOB even at rest, pulse 100-120)    Negative: Headache and stiff neck (can't touch chin to chest)    Negative: Oxygen level (e.g., pulse oximetry) 90 percent or lower    Negative: [1] Diagnosed or suspected COVID-19 AND [2] symptoms lasting 3 or more weeks    Negative: [1] COVID-19 exposure AND [2] no symptoms    Negative: COVID-19 vaccine reaction suspected (e.g., fever, headache, muscle aches) occurring 1 to 3 days after getting vaccine    Negative: COVID-19 vaccine, questions about    Negative: [1] Lives with someone known to have influenza (flu test positive) AND [2] flu-like symptoms (e.g., cough, runny nose, sore throat, SOB; with or without fever)    Negative: [1] Adult with possible COVID-19 symptoms AND [2] triager concerned about severity of symptoms or other causes    Negative: COVID-19 and breastfeeding, questions about    Negative: SEVERE difficulty breathing (e.g., struggling for each breath, speaks in single words)    Negative: Difficult to awaken or acting confused (e.g., disoriented, slurred speech)    Negative: Bluish (or gray) lips or face now    Negative: Shock suspected (e.g., cold/pale/clammy skin, too weak to stand, low BP, rapid pulse)    Negative: Sounds like a life-threatening emergency to the triager    Protocols used: CORONAVIRUS (COVID-19) DIAGNOSED OR [id number] 1.18.2022

## 2022-11-23 DIAGNOSIS — I10 ESSENTIAL HYPERTENSION WITH GOAL BLOOD PRESSURE LESS THAN 140/90: ICD-10-CM

## 2022-11-23 DIAGNOSIS — Z86.73 HISTORY OF CVA (CEREBROVASCULAR ACCIDENT): ICD-10-CM

## 2022-11-23 DIAGNOSIS — I25.810 CORONARY ARTERY DISEASE INVOLVING CORONARY BYPASS GRAFT OF NATIVE HEART WITHOUT ANGINA PECTORIS: ICD-10-CM

## 2022-11-23 DIAGNOSIS — E78.5 HYPERLIPIDEMIA LDL GOAL <70: ICD-10-CM

## 2022-11-23 RX ORDER — CLOPIDOGREL BISULFATE 75 MG/1
75 TABLET ORAL DAILY
Qty: 90 TABLET | Refills: 0 | Status: SHIPPED | OUTPATIENT
Start: 2022-11-23 | End: 2023-01-18

## 2022-11-23 RX ORDER — ATORVASTATIN CALCIUM 40 MG/1
40 TABLET, FILM COATED ORAL DAILY
Qty: 90 TABLET | Refills: 0 | Status: SHIPPED | OUTPATIENT
Start: 2022-11-23 | End: 2023-01-18

## 2022-11-23 RX ORDER — AMLODIPINE BESYLATE 10 MG/1
10 TABLET ORAL DAILY
Qty: 90 TABLET | Refills: 3 | OUTPATIENT
Start: 2022-11-23

## 2022-11-23 RX ORDER — LISINOPRIL 10 MG/1
10 TABLET ORAL 2 TIMES DAILY
Qty: 180 TABLET | Refills: 3 | OUTPATIENT
Start: 2022-11-23

## 2022-11-23 NOTE — TELEPHONE ENCOUNTER
Pt calling for medication refill.     RN requesting what medication pt wants refilled.     Pt unable to relay what medications he wants filled.     RN tried to ask what medication pt is requesting.     Amlodipine  Lisinopril  Clopidogrel Bisulfate  Atorvastatin    RN transferred pt to central scheduling to set up an appointment.     Montse Hayward RN

## 2022-11-24 RX ORDER — LISINOPRIL 10 MG/1
10 TABLET ORAL 2 TIMES DAILY
Qty: 180 TABLET | Refills: 3 | OUTPATIENT
Start: 2022-11-24

## 2022-11-24 RX ORDER — CLOPIDOGREL BISULFATE 75 MG/1
TABLET ORAL
Qty: 90 TABLET | Refills: 0 | OUTPATIENT
Start: 2022-11-24

## 2023-01-18 ENCOUNTER — OFFICE VISIT (OUTPATIENT)
Dept: FAMILY MEDICINE | Facility: CLINIC | Age: 73
End: 2023-01-18
Payer: COMMERCIAL

## 2023-01-18 ENCOUNTER — ANCILLARY PROCEDURE (OUTPATIENT)
Dept: GENERAL RADIOLOGY | Facility: CLINIC | Age: 73
End: 2023-01-18
Attending: STUDENT IN AN ORGANIZED HEALTH CARE EDUCATION/TRAINING PROGRAM
Payer: COMMERCIAL

## 2023-01-18 VITALS
WEIGHT: 228 LBS | HEIGHT: 69 IN | OXYGEN SATURATION: 98 % | BODY MASS INDEX: 33.77 KG/M2 | SYSTOLIC BLOOD PRESSURE: 160 MMHG | HEART RATE: 62 BPM | DIASTOLIC BLOOD PRESSURE: 80 MMHG

## 2023-01-18 DIAGNOSIS — H10.11 ALLERGIC CONJUNCTIVITIS, RIGHT: ICD-10-CM

## 2023-01-18 DIAGNOSIS — G81.94 LEFT HEMIPARESIS (H): ICD-10-CM

## 2023-01-18 DIAGNOSIS — I10 ESSENTIAL HYPERTENSION WITH GOAL BLOOD PRESSURE LESS THAN 140/90: Primary | ICD-10-CM

## 2023-01-18 DIAGNOSIS — M25.362 KNEE GIVES OUT, LEFT: ICD-10-CM

## 2023-01-18 DIAGNOSIS — Z13.6 SCREENING FOR AAA (ABDOMINAL AORTIC ANEURYSM): ICD-10-CM

## 2023-01-18 DIAGNOSIS — I25.810 CORONARY ARTERY DISEASE INVOLVING CORONARY BYPASS GRAFT OF NATIVE HEART WITHOUT ANGINA PECTORIS: ICD-10-CM

## 2023-01-18 DIAGNOSIS — E78.5 HYPERLIPIDEMIA LDL GOAL <70: ICD-10-CM

## 2023-01-18 DIAGNOSIS — E11.59 TYPE 2 DIABETES MELLITUS WITH OTHER CIRCULATORY COMPLICATIONS (H): ICD-10-CM

## 2023-01-18 DIAGNOSIS — Z86.73 HISTORY OF CVA (CEREBROVASCULAR ACCIDENT): ICD-10-CM

## 2023-01-18 DIAGNOSIS — R35.0 URINARY FREQUENCY: ICD-10-CM

## 2023-01-18 DIAGNOSIS — Z13.220 SCREENING FOR HYPERLIPIDEMIA: ICD-10-CM

## 2023-01-18 DIAGNOSIS — Z12.5 SCREENING FOR PROSTATE CANCER: ICD-10-CM

## 2023-01-18 LAB
ANION GAP SERPL CALCULATED.3IONS-SCNC: 13 MMOL/L (ref 7–15)
BUN SERPL-MCNC: 16.3 MG/DL (ref 8–23)
CALCIUM SERPL-MCNC: 9.3 MG/DL (ref 8.8–10.2)
CHLORIDE SERPL-SCNC: 106 MMOL/L (ref 98–107)
CHOLEST SERPL-MCNC: 153 MG/DL
CREAT SERPL-MCNC: 1.1 MG/DL (ref 0.67–1.17)
CREAT UR-MCNC: 47 MG/DL
DEPRECATED HCO3 PLAS-SCNC: 24 MMOL/L (ref 22–29)
GFR SERPL CREATININE-BSD FRML MDRD: 70 ML/MIN/1.73M2
GLUCOSE SERPL-MCNC: 142 MG/DL (ref 70–99)
HBA1C MFR BLD: 6.4 % (ref 0–5.6)
HDLC SERPL-MCNC: 41 MG/DL
LDLC SERPL CALC-MCNC: 81 MG/DL
MICROALBUMIN UR-MCNC: 33.9 MG/L
MICROALBUMIN/CREAT UR: 72.13 MG/G CR (ref 0–17)
NONHDLC SERPL-MCNC: 112 MG/DL
POTASSIUM SERPL-SCNC: 4.1 MMOL/L (ref 3.4–5.3)
PSA SERPL-MCNC: 0.88 NG/ML (ref 0–6.5)
SODIUM SERPL-SCNC: 143 MMOL/L (ref 136–145)
TRIGL SERPL-MCNC: 155 MG/DL

## 2023-01-18 PROCEDURE — 80061 LIPID PANEL: CPT | Performed by: STUDENT IN AN ORGANIZED HEALTH CARE EDUCATION/TRAINING PROGRAM

## 2023-01-18 PROCEDURE — 99214 OFFICE O/P EST MOD 30 MIN: CPT | Performed by: STUDENT IN AN ORGANIZED HEALTH CARE EDUCATION/TRAINING PROGRAM

## 2023-01-18 PROCEDURE — 83036 HEMOGLOBIN GLYCOSYLATED A1C: CPT | Performed by: STUDENT IN AN ORGANIZED HEALTH CARE EDUCATION/TRAINING PROGRAM

## 2023-01-18 PROCEDURE — 80048 BASIC METABOLIC PNL TOTAL CA: CPT | Performed by: STUDENT IN AN ORGANIZED HEALTH CARE EDUCATION/TRAINING PROGRAM

## 2023-01-18 PROCEDURE — G0103 PSA SCREENING: HCPCS | Performed by: STUDENT IN AN ORGANIZED HEALTH CARE EDUCATION/TRAINING PROGRAM

## 2023-01-18 PROCEDURE — 82043 UR ALBUMIN QUANTITATIVE: CPT | Performed by: STUDENT IN AN ORGANIZED HEALTH CARE EDUCATION/TRAINING PROGRAM

## 2023-01-18 PROCEDURE — 82570 ASSAY OF URINE CREATININE: CPT | Performed by: STUDENT IN AN ORGANIZED HEALTH CARE EDUCATION/TRAINING PROGRAM

## 2023-01-18 PROCEDURE — 73562 X-RAY EXAM OF KNEE 3: CPT | Mod: TC | Performed by: RADIOLOGY

## 2023-01-18 PROCEDURE — 36415 COLL VENOUS BLD VENIPUNCTURE: CPT | Performed by: STUDENT IN AN ORGANIZED HEALTH CARE EDUCATION/TRAINING PROGRAM

## 2023-01-18 RX ORDER — CLOPIDOGREL BISULFATE 75 MG/1
75 TABLET ORAL DAILY
Qty: 90 TABLET | Refills: 1 | Status: SHIPPED | OUTPATIENT
Start: 2023-01-18 | End: 2023-09-13

## 2023-01-18 RX ORDER — ATORVASTATIN CALCIUM 40 MG/1
40 TABLET, FILM COATED ORAL DAILY
Qty: 90 TABLET | Refills: 3 | Status: SHIPPED | OUTPATIENT
Start: 2023-01-18

## 2023-01-18 RX ORDER — OLOPATADINE HYDROCHLORIDE 1 MG/ML
1 SOLUTION/ DROPS OPHTHALMIC 2 TIMES DAILY
Qty: 5 ML | Refills: 0 | Status: SHIPPED | OUTPATIENT
Start: 2023-01-18

## 2023-01-18 RX ORDER — AMLODIPINE BESYLATE 10 MG/1
10 TABLET ORAL DAILY
Qty: 90 TABLET | Refills: 1 | Status: SHIPPED | OUTPATIENT
Start: 2023-01-18 | End: 2023-09-13

## 2023-01-18 RX ORDER — LISINOPRIL 10 MG/1
10 TABLET ORAL 2 TIMES DAILY
Qty: 180 TABLET | Refills: 1 | Status: SHIPPED | OUTPATIENT
Start: 2023-01-18 | End: 2023-09-13

## 2023-01-18 RX ORDER — CARVEDILOL 25 MG/1
25 TABLET ORAL 2 TIMES DAILY WITH MEALS
Qty: 180 TABLET | Refills: 1 | Status: SHIPPED | OUTPATIENT
Start: 2023-01-18 | End: 2023-09-13

## 2023-01-18 RX ORDER — OXYBUTYNIN CHLORIDE 10 MG/1
TABLET, EXTENDED RELEASE ORAL
Qty: 90 TABLET | Refills: 1 | Status: SHIPPED | OUTPATIENT
Start: 2023-01-18 | End: 2023-09-13

## 2023-01-18 NOTE — LETTER
January 20, 2023      Bhavin Torres  [address] [address]        Dear Bhavin ,    We are writing to inform you of your test results.       Labs are consistent with diabetic kidney disease, you have some protein in your urine, but otherwise your kidneys are functioning well.  Normal electrolytes.  Cholesterol is adequately controlled.  Prostate cancer screening is normal.  Hemoglobin A1c returned at 6.4%, this is stable as we had discussed at your appointment.       Resulted Orders   HEMOGLOBIN A1C   Result Value Ref Range    Hemoglobin A1C 6.4 (H) 0.0 - 5.6 %      Comment:      Normal <5.7%   Prediabetes 5.7-6.4%    Diabetes 6.5% or higher     Note: Adopted from ADA consensus guidelines.   BASIC METABOLIC PANEL   Result Value Ref Range    Sodium 143 136 - 145 mmol/L    Potassium 4.1 3.4 - 5.3 mmol/L    Chloride 106 98 - 107 mmol/L    Carbon Dioxide (CO2) 24 22 - 29 mmol/L    Anion Gap 13 7 - 15 mmol/L    Urea Nitrogen 16.3 8.0 - 23.0 mg/dL    Creatinine 1.10 0.67 - 1.17 mg/dL    Calcium 9.3 8.8 - 10.2 mg/dL    Glucose 142 (H) 70 - 99 mg/dL    GFR Estimate 70 >60 mL/min/1.73m2      Comment:      Effective December 21, 2021 eGFRcr in adults is calculated using the 2021 CKD-EPI creatinine equation which includes age and gender (Gabriella castellanos al., [place], DOI: 10.1056/SNBMsc9156684)   Lipid panel reflex to direct LDL Non-fasting   Result Value Ref Range    Cholesterol 153 <200 mg/dL    Triglycerides 155 (H) <150 mg/dL    Direct Measure HDL 41 >=40 mg/dL    LDL Cholesterol Calculated 81 <=100 mg/dL    Non HDL Cholesterol 112 <130 mg/dL    Narrative    Cholesterol  Desirable:  <200 mg/dL    Triglycerides  Normal:  Less than 150 mg/dL  Borderline High:  150-199 mg/dL  High:  200-499 mg/dL  Very High:  Greater than or equal to 500 mg/dL    Direct Measure HDL  Female:  Greater than or equal to 50 mg/dL   Male:  Greater than or equal to 40 mg/dL    LDL Cholesterol  Desirable:  <100mg/dL  Above Desirable:   100-129 mg/dL   Borderline High:  130-159 mg/dL   High:  160-189 mg/dL   Very High:  >= 190 mg/dL    Non HDL Cholesterol  Desirable:  130 mg/dL  Above Desirable:  130-159 mg/dL  Borderline High:  160-189 mg/dL  High:  190-219 mg/dL  Very High:  Greater than or equal to 220 mg/dL   Albumin Random Urine Quantitative with Creat Ratio   Result Value Ref Range    Albumin Urine mg/L 33.9 mg/L      Comment:      The reference ranges have not been established in urine albumin. The results should be integrated into the clinical context for interpretation.    Albumin Urine mg/g Cr 72.13 (H) 0.00 - 17.00 mg/g Cr      Comment:      Microalbuminuria is defined as an albumin:creatinine ratio of 17 to 299 for males and 25 to 299 for females. A ratio of albumin:creatinine of 300 or higher is indicative of overt proteinuria.  Due to biologic variability, positive results should be confirmed by a second, first-morning random or 24-hour timed urine specimen. If there is discrepancy, a third specimen is recommended. When 2 out of 3 results are in the microalbuminuria range, this is evidence for incipient nephropathy and warrants increased efforts at glucose control, blood pressure control, and institution of therapy with an angiotensin-converting-enzyme (ACE) inhibitor (if the patient can tolerate it).      Creatinine Urine mg/dL 47.0 mg/dL      Comment:      The reference ranges have not been established in urine creatinine. The results should be integrated into the clinical context for interpretation.   PSA, screen   Result Value Ref Range    Prostate Specific Antigen Screen 0.88 0.00 - 6.50 ng/mL    Narrative    This result is obtained using the Roche Elecsys total PSA method on the tressa [id number] immunoassay analyzer. Results obtained with different assay methods or kits cannot be used interchangeably.       Please let me know if you have any questions, otherwise I will plan on following up with you in a month.     Sincerely,   Safia  DO Mikel/kb

## 2023-01-18 NOTE — PATIENT INSTRUCTIONS
Use eye drops on right eye twice per day    Please call to schedule your imaging at: [phone]  (your AAA screen)      Physical therapy will call you about scheduling.   Xray today    Start monitoring your BP at home and write it down.     Bring all your medications with you.

## 2023-01-18 NOTE — PROGRESS NOTES
lif  Assessment & Plan     Essential hypertension with goal blood pressure less than 140/90  Blood pressure inadequately controlled but did improve on repeat testing.  Based on his medication refills I do not believe that he is been taking his antihypertensives.  They are unable to confirm or deny this but they think he has been taking it.  I will resend his medications and then have follow-up in 1 month and bring all medications with him to confirm.  At his last visit his blood pressure was adequately controlled with his current regimen.  Check BMP today  - BASIC METABOLIC PANEL; Future  - carvedilol (COREG) 25 MG tablet; Take 1 tablet (25 mg) by mouth 2 times daily (with meals)  - lisinopril (ZESTRIL) 10 MG tablet; Take 1 tablet (10 mg) by mouth 2 times daily  - amLODIPine (NORVASC) 10 MG tablet; Take 1 tablet (10 mg) by mouth daily  - BASIC METABOLIC PANEL    Screening for hyperlipidemia  - Lipid panel reflex to direct LDL Non-fasting; Future  - Lipid panel reflex to direct LDL Non-fasting    Coronary artery disease involving coronary bypass graft of native heart without angina pectoris  Has not been following with cardiology.  - Lipid panel reflex to direct LDL Non-fasting; Future  - carvedilol (COREG) 25 MG tablet; Take 1 tablet (25 mg) by mouth 2 times daily (with meals)  - lisinopril (ZESTRIL) 10 MG tablet; Take 1 tablet (10 mg) by mouth 2 times daily  - amLODIPine (NORVASC) 10 MG tablet; Take 1 tablet (10 mg) by mouth daily  - clopidogrel (PLAVIX) 75 MG tablet; Take 1 tablet (75 mg) by mouth daily  - [facility] Order for DME - ONLY FOR DME  - Lipid panel reflex to direct LDL Non-fasting        Knee gives out, left  He had fallen in a snow bank a couple weeks ago and has had his knee give out a few times since then.  He did have this issue prior to fall in the snow, likely due to stroke history.  We will obtain an x-ray today to see if there is any arthritic component contributing to his worsening stability  issues.  See physical therapy to help with strengthening of his muscles surrounding his knees.  - XR Knee Left 3 Views; Future  - Physical Therapy Referral; Future    History of CVA (cerebrovascular accident)  Left hemiparesis (H)  Known left hemiparesis status post CVA.  His life alert button was recently broke, we will order a new 1 of this discussed the importance given his mobility issues and medical history.  - [facility] Order for DME - ONLY FOR DME  - carvedilol (COREG) 25 MG tablet; Take 1 tablet (25 mg) by mouth 2 times daily (with meals)  - amLODIPine (NORVASC) 10 MG tablet; Take 1 tablet (10 mg) by mouth daily  - clopidogrel (PLAVIX) 75 MG tablet; Take 1 tablet (75 mg) by mouth daily  - [facility] Order for DME - ONLY FOR DME    Hyperlipidemia LDL goal <70  - atorvastatin (LIPITOR) 40 MG tablet; Take 1 tablet (40 mg) by mouth daily    Urinary frequency  Based on the chart it also looks like he has been out of this medication for at least 6+ months he is not sure but he thinks he has been taking it.  He has been having issues with his overactive bladder.  I am hesitant to make any changes medication regularly.  Refill sent to his pharmacy today, reevaluate at next visit  - oxybutynin ER (DITROPAN XL) 10 MG 24 hr tablet; TAKE ONE TABLET BY MOUTH AT BEDTIME    Type 2 diabetes mellitus with other circulatory complications (H)  Lab Results   Component Value Date    A1C 6.4 01/18/2023    A1C 6.4 01/14/2022    A1C 6.1 01/08/2021    A1C 5.3 11/05/2019    A1C 5.3 04/29/2019    A1C 5.0 10/29/2018    A1C 5.3 06/25/2018     Diabetes adequately controlled without medications, continue to monitor every 6 to 12 months.  Recommend optometry evaluation especially given his current eye symptoms.  Currently on an ACE inhibitor and statin  - HEMOGLOBIN A1C; Future  - Albumin Random Urine Quantitative with Creat Ratio; Future  - HEMOGLOBIN A1C  - Albumin Random Urine Quantitative with Creat Ratio    Allergic conjunctivitis,  right  Clear tearing of the right eye.  I recommend doing a treatment for antihistamine eyedrops and if not improving on exam next month and consider referral to optometry for evaluation for lacrimal stenosis.  No signs of viral or bacterial conjunctivitis.  - olopatadine (PATANOL) 0.1 % ophthalmic solution; Place 1 drop into the right eye 2 times daily    Screening for prostate cancer  - PSA, screen; Future  - PSA, screen    Screening for AAA (abdominal aortic aneurysm)  - US Abdominal Aorta Imaging; Future    Return in about 6 weeks (around 3/1/2023) for Follow up.    Safia Morin DO  [facility]    Teagan Delcid is a 72 year old accompanied by his spouse, presenting for the following health issues:  ER F/U      HPI      Medical history of DMII, MI, CAD, HTN, GERD, CVA, left hemiparesis (2020)    ED/UC Followup:    Facility:  [facility]   Date of visit: 01/09/2023  Reason for visit: Fall  Current Status: better, balance off     CT head was performed for weakness given pressure of stroke without any acute findings. Labs overall reassuring.Most likely mechanical fall with prolonged downtime. EKG shows no acute changes from prior.    Walked into too deep of snow and fell.  Since then has been feeling more off balance  Left knee giving out- started prior to the snow/fall. Has been giving out more since his fall.   No knee pain or swelling. Except does have pain when trying to get up.  When he falls he can't get up.  Hasn't fallen since then.  Using cane infrequently -- just started to use it yesterday.  No stiffness in the knee.      Eye-  Watery eye, right  All the time  Mainly in the winter  No itching, redness      Hyperlipidemia Follow-Up      Are you regularly taking any medication or supplement to lower your cholesterol?   Yes- Atorvastatin     Are you having muscle aches or other side effects that you think could be caused by your cholesterol lowering  "medication?  No    Hypertension Follow-up      Do you check your blood pressure regularly outside of the clinic? No has machine but never uses it    Are you following a low salt diet? Yes watches it     Are your blood pressures ever more than 140 on the top number (systolic) OR more   than 90 on the bottom number (diastolic), for example 140/90?   In ED, BP was 193/82  No chest pain, headaches, dizziness, lightheadedness  Floaters in eyes.  Hasn't taken meds yet today, takes around noon.  Doesn't check BP at home.    Vascular Disease Follow-up      How often do you take nitroglycerin? Never    Do you take an aspirin every day? Yes      Has been over a year since vascular appt.   Had carotid US in Sept 2022.      Declines all vaccine      Imaging Results - US CAROTID DUPLEX BILATERAL (09/29/2022 1:41 PM CDT)  Impressions   09/29/2022 5:04 PM CDT      1.  Reported right carotid endarterectomy with diffusely scattered plaque and peak systolic velocities in the proximal right ICA consistent with 50-69% stenosis.  2.  Mild diffuse plaque in the left, peak systolic velocities consistent with less than 50% stenosis of the left internal carotid artery.  3.  Antegrade left vertebral flow. Right vertebral artery not visualized.  4.  Mild stenosis left external carotid artery.       Lab Results   Component Value Date    A1C 6.4 01/14/2022    A1C 6.1 01/08/2021    A1C 5.3 11/05/2019    A1C 5.3 04/29/2019    A1C 5.0 10/29/2018    A1C 5.3 06/25/2018           Review of Systems   Constitutional, HEENT, cardiovascular, pulmonary, gi and gu systems are negative, except as otherwise noted.      Objective    BP (!) 160/80 (BP Location: Right arm, Patient Position: Sitting, Cuff Size: Adult Large)   Pulse 62   Ht 1.74 m (5' 8.5\")   Wt 103.4 kg (228 lb)   SpO2 98%   BMI 34.16 kg/m    Body mass index is 34.16 kg/m .  Physical Exam   GENERAL: healthy, alert and no distress  EYES: Eyes grossly normal to inspection, PERRL and " conjunctivae and sclerae normal  HENT: ear canals and TM's normal, nose and mouth without ulcers or lesions  NECK: no adenopathy, no asymmetry, masses, or scars and thyroid normal to palpation  RESP: lungs clear to auscultation - no rales, rhonchi or wheezes  CV: regular rate and rhythm, normal S1 S2, no S3 or S4, no murmur, click or rub, no peripheral edema and peripheral pulses strong  ABDOMEN: soft, nontender, no hepatosplenomegaly, no masses and bowel sounds normal  MS: no gross musculoskeletal defects noted, no edema, no joint laxity or edema of the knee.  No tenderness to palpation of the knee.  SKIN: no suspicious lesions or rashes  NEURO: Normal strength and tone, mentation intact and speech normal  PSYCH: mentation appears normal, affect normal/bright

## 2023-01-27 ENCOUNTER — ANCILLARY PROCEDURE (OUTPATIENT)
Dept: ULTRASOUND IMAGING | Facility: CLINIC | Age: 73
End: 2023-01-27
Attending: STUDENT IN AN ORGANIZED HEALTH CARE EDUCATION/TRAINING PROGRAM
Payer: COMMERCIAL

## 2023-01-27 DIAGNOSIS — Z13.6 SCREENING FOR AAA (ABDOMINAL AORTIC ANEURYSM): ICD-10-CM

## 2023-01-27 PROCEDURE — 76775 US EXAM ABDO BACK WALL LIM: CPT | Mod: TC | Performed by: RADIOLOGY

## 2023-02-15 ENCOUNTER — OFFICE VISIT (OUTPATIENT)
Dept: FAMILY MEDICINE | Facility: CLINIC | Age: 73
End: 2023-02-15
Payer: COMMERCIAL

## 2023-02-15 ENCOUNTER — TELEPHONE (OUTPATIENT)
Dept: FAMILY MEDICINE | Facility: CLINIC | Age: 73
End: 2023-02-15

## 2023-02-15 VITALS
WEIGHT: 225 LBS | HEIGHT: 69 IN | BODY MASS INDEX: 33.33 KG/M2 | TEMPERATURE: 98 F | SYSTOLIC BLOOD PRESSURE: 132 MMHG | HEART RATE: 57 BPM | DIASTOLIC BLOOD PRESSURE: 74 MMHG | OXYGEN SATURATION: 98 %

## 2023-02-15 DIAGNOSIS — R35.0 BENIGN PROSTATIC HYPERPLASIA WITH URINARY FREQUENCY: ICD-10-CM

## 2023-02-15 DIAGNOSIS — R07.9 CHEST PAIN, UNSPECIFIED TYPE: ICD-10-CM

## 2023-02-15 DIAGNOSIS — R53.83 OTHER FATIGUE: ICD-10-CM

## 2023-02-15 DIAGNOSIS — I70.212 ATHEROSCLEROSIS OF NATIVE ARTERIES OF EXTREMITIES WITH INTERMITTENT CLAUDICATION, LEFT LEG (H): ICD-10-CM

## 2023-02-15 DIAGNOSIS — I25.10 CORONARY ARTERY DISEASE INVOLVING NATIVE HEART WITHOUT ANGINA PECTORIS, UNSPECIFIED VESSEL OR LESION TYPE: ICD-10-CM

## 2023-02-15 DIAGNOSIS — E11.59 TYPE 2 DIABETES MELLITUS WITH OTHER CIRCULATORY COMPLICATIONS (H): Primary | ICD-10-CM

## 2023-02-15 DIAGNOSIS — N40.1 BENIGN PROSTATIC HYPERPLASIA WITH URINARY FREQUENCY: ICD-10-CM

## 2023-02-15 DIAGNOSIS — R35.0 URINARY FREQUENCY: ICD-10-CM

## 2023-02-15 PROBLEM — E66.01 MORBID OBESITY (H): Status: RESOLVED | Noted: 2021-10-20 | Resolved: 2023-02-15

## 2023-02-15 PROCEDURE — 99214 OFFICE O/P EST MOD 30 MIN: CPT | Performed by: STUDENT IN AN ORGANIZED HEALTH CARE EDUCATION/TRAINING PROGRAM

## 2023-02-15 RX ORDER — NITROGLYCERIN 0.4 MG/1
TABLET SUBLINGUAL
Qty: 2 TABLET | Refills: 0 | Status: SHIPPED | OUTPATIENT
Start: 2023-02-15 | End: 2023-02-15

## 2023-02-15 RX ORDER — TAMSULOSIN HYDROCHLORIDE 0.4 MG/1
0.4 CAPSULE ORAL DAILY
Qty: 90 CAPSULE | Refills: 1 | Status: SHIPPED | OUTPATIENT
Start: 2023-02-15 | End: 2023-09-13

## 2023-02-15 RX ORDER — NITROGLYCERIN 0.4 MG/1
0.4 TABLET SUBLINGUAL EVERY 5 MIN PRN
Qty: 25 TABLET | Refills: 0 | Status: SHIPPED | OUTPATIENT
Start: 2023-02-15

## 2023-02-15 NOTE — PROGRESS NOTES
Assessment & Plan     Coronary artery disease involving native heart without angina pectoris, unspecified vessel or lesion type  Chest pain, unspecified type  Will refill nitroglycerin since his , has not needed. Doesn't follow with cardiology currently. Recommend ECHO to evaluate cardiac function give his fatigue. Continue ASA, plavix, statin, coreg, lisinopril.   - nitroGLYcerin (NITROSTAT) 0.4 MG sublingual tablet; 1 tablet now  - Echocardiogram Complete; Future      Type 2 diabetes mellitus with other circulatory complications (H)  Lab Results   Component Value Date    A1C 6.4 2023    A1C 6.4 2022    A1C 6.1 2021    A1C 5.3 2019    A1C 5.3 2019    A1C 5.0 10/29/2018    A1C 5.3 2018     Recent labs showed adequately controlled diabetes with diet alone.     Other fatigue  Unclear etiology. Possible deconditioning contributing. Discussed labwork to evaluate, declined labs today. Recent labs did show normal hemoglobin. Normal PSA.  Outside window for lung cancer screening. Recommend repeat ECHO give his cardiac hx of CABG. Endorses some orthopnea but no MONROY or edema.   - Echocardiogram Complete; Future    Benign prostatic hyperplasia with urinary frequency  Urinary frequency  Endorses symptoms of urinary frequency and difficulty emptying bladder. Saw urology previously, last in  and was on flomax, oxybutynin and myrbetriq. He has oxybutynin ordered but hasn't started it yet. Due to potential side effects will have him hold ditropan and start flomax.   - tamsulosin (FLOMAX) 0.4 MG capsule; Take 1 capsule (0.4 mg) by mouth daily    Atherosclerosis of native arteries of extremities with intermittent claudication, left leg (H)  Denies symptoms of claudication today but also not very active. Previously followed by vascular, stefano. Also has Carotid stenosis, had recent carotid US        Return in about 6 months (around 8/15/2023).    Safia Morin DO  [facility]  "CLINIC [place]    Teagan Delcid is a 72 year old, presenting for the following health issues:  Chronic Disease Management      HPI     Hypertension Follow-up      Do you check your blood pressure regularly outside of the clinic? No couldn't figure out how to use bp machine     Are you following a low salt diet? Yes    Are your blood pressures ever more than 140 on the top number (systolic) OR more   than 90 on the bottom number (diastolic), for example 140/90?       More fatigue  Taking more naps   Sleeps well at night   Some balance issues, no falls   No snoring known  Little more confused.  Knee pain when getting to stand   Declines PT  Some orthopnea   No LE edema   No MONROY      Urinary urgency - voids a lot through the day. No nocturia.  Not sure if takes oxybutynin.  No hematuria. No dysuria  No incontinence.  Some dribbling. No issues starting/stopping urine.  Hard to empty bladder  Saw urology in 2019 and 2020 - on flomax, ditropan, myrbetriq  Not taking oxybutynin currently   Drinks EtOH once/week    Former smoker, quit 1986   no SOB, cough, wheeze        Review of Systems   Constitutional, HEENT, cardiovascular, pulmonary, gi and gu systems are negative, except as otherwise noted.      Objective    /74 (BP Location: Right arm, Patient Position: Sitting, Cuff Size: Adult Large)   Pulse 57   Temp 98  F (36.7  C) (Oral)   Ht 1.74 m (5' 8.5\")   Wt 102.1 kg (225 lb)   SpO2 98%   BMI 33.71 kg/m    Body mass index is 33.71 kg/m .  Physical Exam   GENERAL: healthy, alert and no distress, fatigued  NECK: no adenopathy, no asymmetry, masses, or scars and thyroid normal to palpation  RESP: lungs clear to auscultation - no rales, rhonchi or wheezes  CV: regular rate and rhythm, normal S1 S2, no S3 or S4, no murmur, click or rub, no peripheral edema and peripheral pulses strong  ABDOMEN: soft, nontender, no hepatosplenomegaly, no masses and bowel sounds normal  MS: no gross musculoskeletal defects " noted, no edema  PSYCH: mentation appears normal, affect flat

## 2023-02-15 NOTE — PATIENT INSTRUCTIONS
Call to schedule your echocardiogram in [place]    Start taking flomax daily for your urinary frequency

## 2023-02-15 NOTE — TELEPHONE ENCOUNTER
Routing to provider    Pharmacy can not dispense two tablets of nitroglycerine - smallest quantity is 25 tablets    nitroGLYcerin (NITROSTAT) 0.4 MG sublingual tablet    Oty:25     Pharmacy: [phone]    Montse Hayward RN

## 2023-03-08 ENCOUNTER — OFFICE VISIT (OUTPATIENT)
Dept: FAMILY MEDICINE | Facility: CLINIC | Age: 73
End: 2023-03-08
Payer: COMMERCIAL

## 2023-03-08 VITALS
BODY MASS INDEX: 33.77 KG/M2 | TEMPERATURE: 97.5 F | HEIGHT: 69 IN | HEART RATE: 62 BPM | SYSTOLIC BLOOD PRESSURE: 130 MMHG | OXYGEN SATURATION: 98 % | WEIGHT: 228 LBS | DIASTOLIC BLOOD PRESSURE: 78 MMHG

## 2023-03-08 DIAGNOSIS — I25.810 CORONARY ARTERY DISEASE INVOLVING CORONARY BYPASS GRAFT OF NATIVE HEART WITHOUT ANGINA PECTORIS: ICD-10-CM

## 2023-03-08 DIAGNOSIS — I65.21 STENOSIS OF RIGHT CAROTID ARTERY: ICD-10-CM

## 2023-03-08 DIAGNOSIS — R42 DIZZINESS: Primary | ICD-10-CM

## 2023-03-08 DIAGNOSIS — G81.94 LEFT HEMIPARESIS (H): ICD-10-CM

## 2023-03-08 DIAGNOSIS — R35.0 URINARY FREQUENCY: ICD-10-CM

## 2023-03-08 PROCEDURE — 99214 OFFICE O/P EST MOD 30 MIN: CPT | Performed by: STUDENT IN AN ORGANIZED HEALTH CARE EDUCATION/TRAINING PROGRAM

## 2023-03-08 NOTE — PROGRESS NOTES
Assessment & Plan     Dizziness  Was seen in the ED for acute onset dizziness, and wife also notes of attention issues at that time and he had had a neck pain a couple days prior.  Work-up in the ED was unrevealing and reassuring.  Incidentally they did see the right carotid artery stenosis status post endarterectomy at 50 to 70%.  His symptoms have resolved that same day and have not recurred.  Discussed potential of labyrinthitis causing dizziness versus TIA hypotension versus other. If symptoms recur recommend being seen.  For now I would also recommend seeing physical therapy to work on balance and strength as he is overall very generalized deconditioned.  - Physical Therapy Referral; Future    Stenosis of right carotid artery  Noted to have 50 to 70% right carotid artery that had progressed from previous imaging in 2021 history of right endarterectomy in 2010.  Recommend repeating imaging of his carotid artery in 6 to 12 months.  - [facility] Order for DME - ONLY FOR DME    Coronary artery disease involving coronary bypass graft of native heart without angina pectoris  No chest pain when dizziness occurred.  We did discuss having a life alert.   - [facility] Order for DME - ONLY FOR DME    Left hemiparesis (H)  Known chronic left hemiparesis that is mild since his CVA.  He does occasionally have some balance issues that I would recommend working with physical therapy on, especially given overall deconditioning  - Physical Therapy Referral; Future    Urinary frequency  Symptoms have improved since starting oxybutynin again 1 month ago.  He has not been having since starting this side effect also has dizziness from the ER.      No follow-ups on file.    Safia Morin DO  [facility]    Teagan Delcid is a 73 year old accompanied by his spouse, presenting for the following health issues:  ER F/U      HPI     ED/UC Followup:    Facility:  [facility]  "Room  Date of visit: 02/28/2023  Reason for visit: Dizziness   Current Status: Better right now    An hour prior to presenting to the ED had dizziness and lightheadedness, also had neck pain for the couple days prior.  Wife says he was having hard time paying attention or focusing at this time as well.  He was at lunch and having a beer when it happened.  Had to be moved from a barstool to a regular chair no vision changes or memory issues. Dizziness resolved while in the ED.    No recurrence of dizziness since then.     Symptoms of dizziness have resolved. Still having some neck pain. No falls. Some balance issues. Never had chest pain. No palpitations.     Did start oxybutynin since our last visit. Voiding less frequently. No side effects noted.     Has a life alert button but recently broke     EKG: First-degree AV block  Left ventricular hypertrophy with repolarization abnormality    CXR: clear     CT of his head did not have any hemorrhage.     NECK CTA:  1. Endarterectomy changes at the right carotid bifurcation with 50-70% luminal stenosis, progressed when compared to MRA dated 10/13/2021.  2. Less than 50% luminal stenosis in the left ICA in the neck.  3. 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              Review of Systems   Constitutional, HEENT, cardiovascular, pulmonary, gi and gu systems are negative, except as otherwise noted.      Objective    /78 (BP Location: Right arm, Patient Position: Sitting, Cuff Size: Adult Large)   Pulse 62   Temp 97.5  F (36.4  C) (Oral)   Ht 1.74 m (5' 8.5\")   Wt 103.4 kg (228 lb)   SpO2 98%   BMI 34.16 kg/m    Body mass index is 34.16 kg/m .  Physical Exam   GENERAL: healthy, alert and no distress  NECK: no adenopathy, no asymmetry, masses, or scars and thyroid normal to palpation  RESP: lungs clear to auscultation - no rales, rhonchi or wheezes  CV: regular rate and rhythm, normal S1 S2, no S3 or S4, no " murmur, click or rub, no peripheral edema and peripheral pulses strong  MS: no gross musculoskeletal defects noted, no edema  NEURO: Normal  tone, mentation intact and speech normal. CN II-XII intact.  Left UE and LE 4.5/5 strength,  Right UE and LE 5/5

## 2023-03-08 NOTE — PATIENT INSTRUCTIONS
You will get a call to schedule the physical therapy     Life alert button  ordered,  from medical equipment store     Restart lisinopril     If dizziness recurs we will do a heart monitor     Will need repeat imaging of your carotid arteries in a year

## 2023-04-10 ENCOUNTER — THERAPY VISIT (OUTPATIENT)
Dept: PHYSICAL THERAPY | Facility: CLINIC | Age: 73
End: 2023-04-10
Payer: COMMERCIAL

## 2023-04-10 DIAGNOSIS — M62.81 GENERALIZED MUSCLE WEAKNESS: Primary | ICD-10-CM

## 2023-04-10 PROCEDURE — 97112 NEUROMUSCULAR REEDUCATION: CPT | Mod: GP | Performed by: PHYSICAL THERAPIST

## 2023-04-10 PROCEDURE — 97110 THERAPEUTIC EXERCISES: CPT | Mod: GP | Performed by: PHYSICAL THERAPIST

## 2023-04-10 PROCEDURE — 97161 PT EVAL LOW COMPLEX 20 MIN: CPT | Mod: GP | Performed by: PHYSICAL THERAPIST

## 2023-04-11 PROBLEM — M62.81 GENERALIZED MUSCLE WEAKNESS: Status: ACTIVE | Noted: 2023-04-11

## 2023-04-12 NOTE — PROGRESS NOTES
[facility]                                                                                   OUTPATIENT PHYSICAL THERAPY FUNCTIONAL EVALUATION  PLAN OF TREATMENT FOR OUTPATIENT REHABILITATION  (COMPLETE FOR INITIAL CLAIMS ONLY)  Patient's Last Name, First Name, M.I.  YOB: 1950  Andrew Torres     Provider's Name   [facility]   Medical Record No.  [id number]     Start of Care Date:  04/10/23   Onset Date:  01/09/23   Type:     _X__PT   ____OT  ____SLP Medical Diagnosis:  deconditioned, balance problem     PT Diagnosis:  generalized weakness Visits from SOC:  1                              __________________________________________________________________________________  Plan of Treatment/Functional Goals:  ADL retraining, balance training, gait training, neuromuscular re-education, strengthening, stretching, transfer training           GOALS  HEP  Patient will report regular participation in home exercise program and daily activity to reduce fall risk and improve functional capacity.  07/08/23    Patient will demonstrate ability to complete STS without use of hands and demonstrate ability to rise from the ground for fall recovery.     07/08/23       Therapy Frequency:  1 time/week   Predicted Duration of Therapy Intervention:  1x/wk for 6 weeks    KIM TORRES, PT                                    I CERTIFY THE NEED FOR THESE SERVICES FURNISHED UNDER        THIS PLAN OF TREATMENT AND WHILE UNDER MY CARE     (Physician co-signature of this document indicates review and certification of the therapy plan).              Certification Date From:  04/10/23   Certification Date To:  07/08/23    Referring Provider:  Safia Morin,     Initial Assessment  See Epic Evaluation- Start of Care Date: 04/10/23

## 2023-05-01 ENCOUNTER — THERAPY VISIT (OUTPATIENT)
Dept: PHYSICAL THERAPY | Facility: CLINIC | Age: 73
End: 2023-05-01
Attending: STUDENT IN AN ORGANIZED HEALTH CARE EDUCATION/TRAINING PROGRAM
Payer: COMMERCIAL

## 2023-05-01 DIAGNOSIS — R42 DIZZINESS: ICD-10-CM

## 2023-05-01 DIAGNOSIS — G81.94 LEFT HEMIPARESIS (H): ICD-10-CM

## 2023-05-01 PROCEDURE — 97530 THERAPEUTIC ACTIVITIES: CPT | Mod: GP | Performed by: PHYSICAL THERAPIST

## 2023-05-01 PROCEDURE — 97112 NEUROMUSCULAR REEDUCATION: CPT | Mod: GP | Performed by: PHYSICAL THERAPIST

## 2023-05-01 PROCEDURE — 97110 THERAPEUTIC EXERCISES: CPT | Mod: GP | Performed by: PHYSICAL THERAPIST

## 2023-05-04 DIAGNOSIS — M25.362 KNEE GIVES OUT, LEFT: Primary | ICD-10-CM

## 2023-05-04 DIAGNOSIS — G81.94 LEFT HEMIPARESIS (H): ICD-10-CM

## 2023-05-04 DIAGNOSIS — R53.81 DEBILITY: ICD-10-CM

## 2023-05-08 ENCOUNTER — THERAPY VISIT (OUTPATIENT)
Dept: PHYSICAL THERAPY | Facility: CLINIC | Age: 73
End: 2023-05-08
Attending: STUDENT IN AN ORGANIZED HEALTH CARE EDUCATION/TRAINING PROGRAM
Payer: COMMERCIAL

## 2023-05-08 DIAGNOSIS — M62.81 GENERALIZED MUSCLE WEAKNESS: Primary | ICD-10-CM

## 2023-05-08 PROCEDURE — 97530 THERAPEUTIC ACTIVITIES: CPT | Mod: GP | Performed by: PHYSICAL THERAPIST

## 2023-05-08 PROCEDURE — 97112 NEUROMUSCULAR REEDUCATION: CPT | Mod: GP | Performed by: PHYSICAL THERAPIST

## 2023-05-08 PROCEDURE — 97535 SELF CARE MNGMENT TRAINING: CPT | Mod: GP | Performed by: PHYSICAL THERAPIST

## 2023-05-15 ENCOUNTER — THERAPY VISIT (OUTPATIENT)
Dept: PHYSICAL THERAPY | Facility: CLINIC | Age: 73
End: 2023-05-15
Attending: STUDENT IN AN ORGANIZED HEALTH CARE EDUCATION/TRAINING PROGRAM
Payer: COMMERCIAL

## 2023-05-15 DIAGNOSIS — M62.81 GENERALIZED MUSCLE WEAKNESS: Primary | ICD-10-CM

## 2023-05-15 PROCEDURE — 97530 THERAPEUTIC ACTIVITIES: CPT | Mod: GP | Performed by: PHYSICAL THERAPIST

## 2023-05-15 PROCEDURE — 97112 NEUROMUSCULAR REEDUCATION: CPT | Mod: GP | Performed by: PHYSICAL THERAPIST

## 2023-05-15 NOTE — PROGRESS NOTES
[facility]    Outpatient Physical Therapy Discharge Note  Patient: Andrew Torres  : 1950    Beginning/End Dates of Reporting Period:  4/10/23 to 5/15/23    Referring Provider: DO Liseth He Diagnosis: Unsteadiness     Client Self Report:  Feels confident with discharge. States how much easier it is so move with recommended four-wheeled walker. Improved confidence with balance overall.     Objective Measurements:  Objective Measure: SLS time  Details: eval 1-2 sec bilat, today 7 sec on R and 3 on L  Objective Measure: Timed up and go  Details: 26 at eval, 13 today            Goals:  Goal Identifier HEP   Goal Description Patient will report regular participation in home exercise program and daily activity to reduce fall risk and improve functional capacity.   Target Date 23   Date Met  05/15/23   Progress (detail required for progress note): goal met     Goal Identifier Patient will demonstrate ability to complete STS without use of hands and demonstrate ability to rise from the ground for fall recovery.   Goal Description 5 times sit to stand 40 seconds with use of UE   Target Date 23   Date Met  05/15/23   Progress (detail required for progress note): 22 sec without hands           Plan:  Discharge from therapy.    Discharge:    Reason for Discharge: Patient has met all goals.        Discharge Plan: Patient to continue home program.

## 2023-09-13 ENCOUNTER — OFFICE VISIT (OUTPATIENT)
Dept: FAMILY MEDICINE | Facility: CLINIC | Age: 73
End: 2023-09-13
Payer: COMMERCIAL

## 2023-09-13 VITALS
HEIGHT: 69 IN | SYSTOLIC BLOOD PRESSURE: 130 MMHG | BODY MASS INDEX: 33.47 KG/M2 | WEIGHT: 226 LBS | DIASTOLIC BLOOD PRESSURE: 70 MMHG | HEART RATE: 70 BPM | TEMPERATURE: 98.1 F | OXYGEN SATURATION: 95 %

## 2023-09-13 DIAGNOSIS — J18.9 PNEUMONIA OF RIGHT LOWER LOBE DUE TO INFECTIOUS ORGANISM: Primary | ICD-10-CM

## 2023-09-13 DIAGNOSIS — R59.9 ENLARGED LYMPH NODES: ICD-10-CM

## 2023-09-13 DIAGNOSIS — Z86.73 HISTORY OF CVA (CEREBROVASCULAR ACCIDENT): ICD-10-CM

## 2023-09-13 DIAGNOSIS — R35.0 BENIGN PROSTATIC HYPERPLASIA WITH URINARY FREQUENCY: ICD-10-CM

## 2023-09-13 DIAGNOSIS — I10 ESSENTIAL HYPERTENSION WITH GOAL BLOOD PRESSURE LESS THAN 140/90: ICD-10-CM

## 2023-09-13 DIAGNOSIS — R04.2 COUGH WITH HEMOPTYSIS: ICD-10-CM

## 2023-09-13 DIAGNOSIS — N40.1 BENIGN PROSTATIC HYPERPLASIA WITH URINARY FREQUENCY: ICD-10-CM

## 2023-09-13 DIAGNOSIS — I25.810 CORONARY ARTERY DISEASE INVOLVING CORONARY BYPASS GRAFT OF NATIVE HEART WITHOUT ANGINA PECTORIS: ICD-10-CM

## 2023-09-13 DIAGNOSIS — R35.0 URINARY FREQUENCY: ICD-10-CM

## 2023-09-13 DIAGNOSIS — E11.59 TYPE 2 DIABETES MELLITUS WITH OTHER CIRCULATORY COMPLICATIONS (H): ICD-10-CM

## 2023-09-13 LAB
CHOLEST SERPL-MCNC: 195 MG/DL
HBA1C MFR BLD: 6.5 % (ref 0–5.6)
HDLC SERPL-MCNC: 34 MG/DL
HOLD SPECIMEN: NORMAL
LDLC SERPL CALC-MCNC: 130 MG/DL
NONHDLC SERPL-MCNC: 161 MG/DL
TRIGL SERPL-MCNC: 153 MG/DL

## 2023-09-13 PROCEDURE — 36415 COLL VENOUS BLD VENIPUNCTURE: CPT | Performed by: STUDENT IN AN ORGANIZED HEALTH CARE EDUCATION/TRAINING PROGRAM

## 2023-09-13 PROCEDURE — 80061 LIPID PANEL: CPT | Performed by: STUDENT IN AN ORGANIZED HEALTH CARE EDUCATION/TRAINING PROGRAM

## 2023-09-13 PROCEDURE — 83036 HEMOGLOBIN GLYCOSYLATED A1C: CPT | Performed by: STUDENT IN AN ORGANIZED HEALTH CARE EDUCATION/TRAINING PROGRAM

## 2023-09-13 PROCEDURE — 99214 OFFICE O/P EST MOD 30 MIN: CPT | Performed by: STUDENT IN AN ORGANIZED HEALTH CARE EDUCATION/TRAINING PROGRAM

## 2023-09-13 RX ORDER — CEFUROXIME AXETIL 500 MG/1
500 TABLET ORAL 2 TIMES DAILY
Qty: 10 TABLET | Refills: 0 | Status: SHIPPED | OUTPATIENT
Start: 2023-09-13 | End: 2023-09-18

## 2023-09-13 RX ORDER — AMLODIPINE BESYLATE 10 MG/1
10 TABLET ORAL DAILY
Qty: 90 TABLET | Refills: 3 | Status: SHIPPED | OUTPATIENT
Start: 2023-09-13

## 2023-09-13 RX ORDER — OXYBUTYNIN CHLORIDE 10 MG/1
TABLET, EXTENDED RELEASE ORAL
Qty: 90 TABLET | Refills: 3 | Status: SHIPPED | OUTPATIENT
Start: 2023-09-13

## 2023-09-13 RX ORDER — CARVEDILOL 25 MG/1
25 TABLET ORAL 2 TIMES DAILY WITH MEALS
Qty: 180 TABLET | Refills: 3 | Status: SHIPPED | OUTPATIENT
Start: 2023-09-13

## 2023-09-13 RX ORDER — LISINOPRIL 10 MG/1
10 TABLET ORAL 2 TIMES DAILY
Qty: 180 TABLET | Refills: 3 | Status: SHIPPED | OUTPATIENT
Start: 2023-09-13 | End: 2024-03-29

## 2023-09-13 RX ORDER — CLOPIDOGREL BISULFATE 75 MG/1
75 TABLET ORAL DAILY
Qty: 90 TABLET | Refills: 3 | Status: SHIPPED | OUTPATIENT
Start: 2023-09-13

## 2023-09-13 RX ORDER — DOXYCYCLINE HYCLATE 100 MG
100 TABLET ORAL 2 TIMES DAILY
Qty: 10 TABLET | Refills: 0 | Status: SHIPPED | OUTPATIENT
Start: 2023-09-13 | End: 2023-09-18

## 2023-09-13 RX ORDER — TAMSULOSIN HYDROCHLORIDE 0.4 MG/1
0.4 CAPSULE ORAL DAILY
Qty: 90 CAPSULE | Refills: 3 | Status: SHIPPED | OUTPATIENT
Start: 2023-09-13

## 2023-09-13 NOTE — PROGRESS NOTES
Assessment & Plan     Pneumonia of right lower lobe due to infectious organism  Cough with hemoptysis  Symptoms improving since diagnosis but still not resolved. Recommend extending antibiotics for 5 days. No longer having hemoptysis. Afebrile, O2 sats normal. Has AWV next week so will follow up   - doxycycline hyclate (VIBRA-TABS) 100 MG tablet; Take 1 tablet (100 mg) by mouth 2 times daily for 5 days  - cefuroxime (CEFTIN) 500 MG tablet; Take 1 tablet (500 mg) by mouth 2 times daily for 5 days    Enlarged lymph nodes  Noted on CT chest while in ED for pneumonia. This may be due to pneumonia but due to findings recommend repeat chest CT in 3 months. Referral sent to [facility] lung nodule clinic - they haven't received a call to schedule yet so told them to watch for this, if not I will order for this to be done        Type 2 diabetes mellitus with other circulatory complications (H)  Lab Results   Component Value Date    A1C 6.5 09/13/2023    A1C 6.4 01/18/2023    A1C 6.4 01/14/2022    A1C 6.1 01/08/2021    A1C 5.3 11/05/2019    A1C 5.3 04/29/2019    A1C 5.0 10/29/2018    A1C 5.3 06/25/2018     Diabetes stable. On statin and ACEI. No medication needed at this time. Recheck in 6 months.   - Hemoglobin A1c; Future  - Hemoglobin A1c    Essential hypertension with goal blood pressure less than 140/90  Chronic, stable, BP well controlled on current regimen. BMP done last week in ED  - amLODIPine (NORVASC) 10 MG tablet; Take 1 tablet (10 mg) by mouth daily  - carvedilol (COREG) 25 MG tablet; Take 1 tablet (25 mg) by mouth 2 times daily (with meals)  - lisinopril (ZESTRIL) 10 MG tablet; Take 1 tablet (10 mg) by mouth 2 times daily    Coronary artery disease involving coronary bypass graft of native heart without angina pectoris  Chronic, stable, no cardiac symptoms. EKG done in ED and unchanged   - amLODIPine (NORVASC) 10 MG tablet; Take 1 tablet (10 mg) by mouth daily  - carvedilol (COREG) 25 MG tablet; Take 1 tablet (25  mg) by mouth 2 times daily (with meals)  - clopidogrel (PLAVIX) 75 MG tablet; Take 1 tablet (75 mg) by mouth daily  - lisinopril (ZESTRIL) 10 MG tablet; Take 1 tablet (10 mg) by mouth 2 times daily  - Lipid panel reflex to direct LDL Fasting; Future  - Lipid panel reflex to direct LDL Fasting    History of CVA (cerebrovascular accident)  Chronic, hx of CVA. No symptoms. Continue plavix and statin  - amLODIPine (NORVASC) 10 MG tablet; Take 1 tablet (10 mg) by mouth daily  - carvedilol (COREG) 25 MG tablet; Take 1 tablet (25 mg) by mouth 2 times daily (with meals)  - clopidogrel (PLAVIX) 75 MG tablet; Take 1 tablet (75 mg) by mouth daily  - Lipid panel reflex to direct LDL Fasting; Future  - Lipid panel reflex to direct LDL Fasting    Urinary frequency  Benign prostatic hyperplasia with urinary frequency  Worsening but he thinks he hasn't been taking his ditropan again, his wife doesn't set this one out for him. He will check his medications at home and restart ditropan and continue flomax  - tamsulosin (FLOMAX) 0.4 MG capsule; Take 1 capsule (0.4 mg) by mouth daily  - oxyBUTYnin ER (DITROPAN XL) 10 MG 24 hr tablet; TAKE ONE TABLET BY MOUTH AT BEDTIME  - tamsulosin (FLOMAX) 0.4 MG capsule; Take 1 capsule (0.4 mg) by mouth daily    Safia Morin [facility]    Teagan Delcid is a 73 year old, presenting for the following health issues:  Hospital F/U        9/13/2023     8:49 AM   Additional Questions   Accompanied by Rianna         9/13/2023     8:49 AM   Patient Reported Additional Medications   Patient reports taking the following new medications Given Doxycyline and Cefuroxime       HPI     ED/UC Followup:    Facility:  [facility]   Date of visit: 09/01/2023  Reason for visit: Cough  Current Status: Same    Diagnosed with penumonia and bronchitis - O2 sats normal, afebrile. EKG unchanged. CT below. started on doxycyline and ceftin.     - patient states today  "about 50 % better since the antibiotic. Less frequent. Can still rattle in chest. Energy a little better. No wheezing. No SOB. Orthopnea present but has been. Sleeping in recliner. No LE edema. No fevers.     Enlarged lymph nodes - referred to lung nodule clinic - needs repeat CT chest in 3 months. Hasn't gotten a call to schedule yet     - > former smoker, quit in 1986, smoked for about 4 years, >1/2 PPD       9/1/2023 CT PE study   IMPRESSION:  1. Negative for pulmonary embolism.    2. Bronchial wall thickening with endobronchial plugging and debris with peribronchovascular opacities in the right lower lobe. This is most suggestive of an infectious/inflammatory bronchitis/bronchiolitis with some concurrent pneumonitis. However given some prominent lymph nodes and central bronchial wall thickening with enhancement, would recommend a follow-up 3 month chest CT with IV contrast.       OAB has been worse. He is unsure if he's taking his ditropan. Had been paused in the winter when went to ED for dizziness. Wife helps with his other medications but not this one    Review of Systems   Constitutional, HEENT, cardiovascular, pulmonary, gi and gu systems are negative, except as otherwise noted.      Objective    /70   Pulse 70   Temp 98.1  F (36.7  C) (Oral)   Ht 1.74 m (5' 8.5\")   Wt 102.5 kg (226 lb)   SpO2 95%   BMI 33.86 kg/m    Body mass index is 33.86 kg/m .  Physical Exam   GENERAL: healthy, alert and no distress  NECK: no adenopathy, no asymmetry, masses, or scars and thyroid normal to palpation  RESP: diminished air flow in RLL of lung, no rhonci/wheeze  CV: regular rate and rhythm, normal S1 S2, no S3 or S4, murmur, no click or rub, no peripheral edema and peripheral pulses strong  ABDOMEN: soft, nontender, no hepatosplenomegaly, no masses and bowel sounds normal  MS: no gross musculoskeletal defects noted, no edema, tenderness to palpation of the right forearm                      "

## 2023-09-13 NOTE — PATIENT INSTRUCTIONS
Expect a call from [facility] lung nodule clinic. You need a repeat CT scan of your chest in 3 months.     Take 5 more days of antibiotics for your pneumonia. Good oxygen levels today.

## 2023-09-20 ENCOUNTER — OFFICE VISIT (OUTPATIENT)
Dept: FAMILY MEDICINE | Facility: CLINIC | Age: 73
End: 2023-09-20
Payer: COMMERCIAL

## 2023-09-20 VITALS
DIASTOLIC BLOOD PRESSURE: 74 MMHG | OXYGEN SATURATION: 96 % | BODY MASS INDEX: 33.18 KG/M2 | HEART RATE: 58 BPM | HEIGHT: 69 IN | WEIGHT: 224 LBS | SYSTOLIC BLOOD PRESSURE: 132 MMHG | RESPIRATION RATE: 24 BRPM | TEMPERATURE: 97.5 F

## 2023-09-20 DIAGNOSIS — E11.59 TYPE 2 DIABETES MELLITUS WITH OTHER CIRCULATORY COMPLICATIONS (H): ICD-10-CM

## 2023-09-20 DIAGNOSIS — R59.9 ENLARGED LYMPH NODES: ICD-10-CM

## 2023-09-20 DIAGNOSIS — Z00.00 ENCOUNTER FOR MEDICARE ANNUAL WELLNESS EXAM: Primary | ICD-10-CM

## 2023-09-20 DIAGNOSIS — J18.9 PNEUMONIA OF RIGHT LOWER LOBE DUE TO INFECTIOUS ORGANISM: ICD-10-CM

## 2023-09-20 PROCEDURE — 99207 PR FOOT EXAM NO CHARGE: CPT | Performed by: STUDENT IN AN ORGANIZED HEALTH CARE EDUCATION/TRAINING PROGRAM

## 2023-09-20 PROCEDURE — G0439 PPPS, SUBSEQ VISIT: HCPCS | Performed by: STUDENT IN AN ORGANIZED HEALTH CARE EDUCATION/TRAINING PROGRAM

## 2023-09-20 ASSESSMENT — ENCOUNTER SYMPTOMS
DIARRHEA: 0
MYALGIAS: 0
CONSTIPATION: 0
ABDOMINAL PAIN: 0
DYSURIA: 0
JOINT SWELLING: 0
ARTHRALGIAS: 1
SHORTNESS OF BREATH: 0
WEAKNESS: 0
CHILLS: 0
EYE PAIN: 0
HEMATURIA: 0
DIZZINESS: 0
COUGH: 1
FEVER: 0
HEMATOCHEZIA: 0
SORE THROAT: 0
PARESTHESIAS: 0
FREQUENCY: 0
NERVOUS/ANXIOUS: 0
NAUSEA: 0
PALPITATIONS: 0
HEARTBURN: 0
HEADACHES: 0

## 2023-09-20 ASSESSMENT — ACTIVITIES OF DAILY LIVING (ADL): CURRENT_FUNCTION: NO ASSISTANCE NEEDED

## 2023-09-20 ASSESSMENT — PAIN SCALES - GENERAL: PAINLEVEL: NO PAIN (0)

## 2023-09-20 NOTE — PROGRESS NOTES
"SUBJECTIVE:   Bhavin is a 73 year old who presents for Preventive Visit.      9/20/2023     8:05 AM   Additional Questions   Roomed by Leonarda   Accompanied by spouse- [place]         9/20/2023     8:05 AM   Patient Reported Additional Medications   Patient reports taking the following new medications none       Are you in the first 12 months of your Medicare coverage?  No    Healthy Habits:     In general, how would you rate your overall health?  Fair    Frequency of exercise:  None    Do you usually eat at least 4 servings of fruit and vegetables a day, include whole grains    & fiber and avoid regularly eating high fat or \"junk\" foods?  Yes    Taking medications regularly:  Yes    Medication side effects:  None    Ability to successfully perform activities of daily living:  No assistance needed    Home Safety:  No safety concerns identified    Hearing Impairment:  No hearing concerns    In the past 6 months, have you been bothered by leaking of urine?  No    In general, how would you rate your overall mental or emotional health?  Fair    Additional concerns today:  No      --- Recheck pneumonia and possibly another round of antibiotics?  About 75% improved. Had extension of antibiotics, finished a few days ago. Still coughing, but less often. No SOB. Endorses chronic orthopnea, sleeps recliner.     Former smoker - quit in 1986. Smoked for <10 years.     Mental health has been okay. News is depressing. Otherwise okay. No anxiety.     Sings karaoke.     No blurred vision or hearing issues    Drinks once/week on Thursdays when goes to [facility]. Aruna and cokes. 3-4 drinks.         Today's PHQ-2 Score:       9/20/2023     8:02 AM   PHQ-2 ( 1999 [employer])   Q1: Little interest or pleasure in doing things 0   Q2: Feeling down, depressed or hopeless 0   PHQ-2 Score 0   Q1: Little interest or pleasure in doing things Not at all   Q2: Feeling down, depressed or hopeless Not at all   PHQ-2 Score 0           Have you ever done Advance " Care Planning? (For example, a Health Directive, POLST, or a discussion with a medical provider or your loved ones about your wishes): Yes, advance care planning is on file.       Fall risk  Fallen 2 or more times in the past year?: No  Any fall with injury in the past year?: No    Cognitive Screening   1) Repeat 3 items (Leader, Season, Table)    2) Clock draw: NORMAL  3) 3 item recall: Recalls 3 objects  Results: 3 items recalled: COGNITIVE IMPAIRMENT LESS LIKELY    Mini-CogTM Copyright SID Sellers. Licensed by the author for use in [facility]; reprinted with permission ([email or web address]). All rights reserved.          Reviewed and updated as needed this visit by clinical staff   Tobacco  Allergies  Meds              Reviewed and updated as needed this visit by Provider                 Social History     Tobacco Use    Smoking status: Former     Passive exposure: Never    Smokeless tobacco: Never    Tobacco comments:     Quit in the 80's 86?   Substance Use Topics    Alcohol use: Yes     Comment: 4 drinks per week             9/20/2023     8:04 AM   Alcohol Use   Prescreen: >3 drinks/day or >7 drinks/week? No         1/14/2022    10:08 AM   AUDIT - Alcohol Use Disorders Identification Test - Reproduced from the World Health Organization Audit 2001 (Second Edition)   1.  How often do you have a drink containing alcohol? 2 to 4 times a month   2.  How many drinks containing alcohol do you have on a typical day when you are drinking? 3 or 4   3.  How often do you have five or more drinks on one occasion? Weekly   4.  How often during the last year have you found that you were not able to stop drinking once you had started? Never   5.  How often during the last year have you failed to do what was normally expected of you because of drinking? Never   6.  How often during the last year have you needed a first drink in the morning to get yourself going after a heavy drinking session? Never   7.  How often during  the last year have you had a feeling of guilt or remorse after drinking? Never   8.  How often during the last year have you been unable to remember what happened the night before because of your drinking? Never   9.  Have you or someone else been injured because of your drinking? No   10. Has a relative, friend, doctor or other health care worker been concerned about your drinking or suggested you cut down? No   TOTAL SCORE 6     Do you have a current opioid prescription? No  Do you use any other controlled substances or medications that are not prescribed by a provider? None        Diabetes Follow-up    How often are you checking your blood sugar? Not at all  What concerns do you have today about your diabetes? None   Do you have any of these symptoms? (Select all that apply)  No numbness or tingling in feet.  No redness, sores or blisters on feet.  No complaints of excessive thirst.  No reports of blurry vision.  No significant changes to weight.  Have you had a diabetic eye exam in the last 12 months? No            Hyperlipidemia Follow-Up    Are you regularly taking any medication or supplement to lower your cholesterol?   Yes- atorvastatin  Are you having muscle aches or other side effects that you think could be caused by your cholesterol lowering medication?  No    Hypertension Follow-up    Do you check your blood pressure regularly outside of the clinic? No   Are you following a low salt diet? No  Are your blood pressures ever more than 140 on the top number (systolic) OR more   than 90 on the bottom number (diastolic), for example 140/90? No    BP Readings from Last 2 Encounters:   09/20/23 132/74   09/13/23 130/70     Hemoglobin A1C (%)   Date Value   09/13/2023 6.5 (H)   01/18/2023 6.4 (H)   01/08/2021 6.1 (H)   11/05/2019 5.3     LDL Cholesterol Calculated (mg/dL)   Date Value   09/13/2023 130 (H)   01/18/2023 81   01/08/2021 111 (H)   04/29/2019 85           Current providers sharing in care for this  patient include:   Patient Care Team:  Clinic - [facility] as PCP - General (Clinic)  Bravo Washington MD as MD (Cardiology)  Nate Mejia MD as MD (Surgery)  Safia Morin DO as Assigned PCP    The following health maintenance items are reviewed in Epic and correct as of today:  Health Maintenance   Topic Date Due    COVID-19 Vaccine (1) Never done    ZOSTER IMMUNIZATION (1 of 2) Never done    LUNG CANCER SCREENING  Never done    Pneumococcal Vaccine: 65+ Years (2 - PCV) 05/06/2010    EYE EXAM  08/03/2016    DTAP/TDAP/TD IMMUNIZATION (5 - Td or Tdap) 05/06/2019    MEDICARE ANNUAL WELLNESS VISIT  01/14/2023    DIABETIC FOOT EXAM  01/14/2023    ANNUAL REVIEW OF HM ORDERS  01/14/2023    INFLUENZA VACCINE (1) Never done    BMP  01/18/2024    MICROALBUMIN  01/18/2024    A1C  03/13/2024    LIPID  09/13/2024    FALL RISK ASSESSMENT  09/20/2024    COLORECTAL CANCER SCREENING  01/12/2027    ADVANCE CARE PLANNING  09/20/2028    HEPATITIS C SCREENING  Completed    PHQ-2 (once per calendar year)  Completed    AORTIC ANEURYSM SCREENING (SYSTEM ASSIGNED)  Completed    IPV IMMUNIZATION  Aged Out    HPV IMMUNIZATION  Aged Out    MENINGITIS IMMUNIZATION  Aged Out     Lab work is in process  Labs reviewed in EPIC  BP Readings from Last 3 Encounters:   09/20/23 132/74   09/13/23 130/70   03/08/23 130/78    Wt Readings from Last 3 Encounters:   09/20/23 101.6 kg (224 lb)   09/13/23 102.5 kg (226 lb)   03/08/23 103.4 kg (228 lb)                  Patient Active Problem List   Diagnosis    CAD (coronary artery disease) stent placed 2006- life long plavix recommended    Carotid stenosis stent placed right 2010    Advanced directives, counseling/discussion    Type 2 diabetes mellitus with other circulatory complications (H)    Coronary artery disease involving coronary bypass graft of native heart without angina pectoris    Gastroesophageal reflux disease without esophagitis    Essential hypertension  with goal blood pressure less than 140/90    Hyperlipidemia LDL goal <70    Uric acid kidney stone    History of CVA (cerebrovascular accident)    Left hemiparesis (H)    PAD (peripheral artery disease) (H)-     History of carotid endarterectomy    Benign neoplasm of skin    Carotid artery occlusion    Acute MI, inferior wall (H)    Former light tobacco smoker    Hemorrhoids without complication    Intermittent claudication (H)    S/P CABG (coronary artery bypass graft)    Tinnitus    BPH (benign prostatic hyperplasia)     Past Surgical History:   Procedure Laterality Date    AS CABG, VEIN, TWO      CABG x 2; GSV harvested from left leg; done at [facility]    CAROTID STENT Right     TONSILLECTOMY         Social History     Tobacco Use    Smoking status: Former     Passive exposure: Never    Smokeless tobacco: Never    Tobacco comments:     Quit in the 80's 86?   Substance Use Topics    Alcohol use: Yes     Comment: 4 drinks per week     Family History   Problem Relation Age of Onset    Hypertension Mother     Hypertension Father     Heart Disease Paternal Grandmother     Cancer Sister         stomach?     Parkinsonism Brother          Current Outpatient Medications   Medication Sig Dispense Refill    amLODIPine (NORVASC) 10 MG tablet Take 1 tablet (10 mg) by mouth daily 90 tablet 3    ASPIRIN 81 PO       atorvastatin (LIPITOR) 40 MG tablet Take 1 tablet (40 mg) by mouth daily 90 tablet 3    carvedilol (COREG) 25 MG tablet Take 1 tablet (25 mg) by mouth 2 times daily (with meals) 180 tablet 3    clopidogrel (PLAVIX) 75 MG tablet Take 1 tablet (75 mg) by mouth daily 90 tablet 3    Cyanocobalamin (B-12) 1000 MCG CAPS Take  by mouth.      Fexofenadine HCl (MUCINEX ALLERGY PO)       lisinopril (ZESTRIL) 10 MG tablet Take 1 tablet (10 mg) by mouth 2 times daily 180 tablet 3    multivitamin, therapeutic with minerals (THERA-VIT-M) TABS tablet Take 1 tablet by mouth once daily 30 tablet 0    nitroGLYcerin (NITROSTAT) 0.4  "MG sublingual tablet Place 1 tablet (0.4 mg) under the tongue every 5 minutes as needed for chest pain 25 tablet 0    olopatadine (PATANOL) 0.1 % ophthalmic solution Place 1 drop into the right eye 2 times daily 5 mL 0    oxyBUTYnin ER (DITROPAN XL) 10 MG 24 hr tablet TAKE ONE TABLET BY MOUTH AT BEDTIME 90 tablet 3    tamsulosin (FLOMAX) 0.4 MG capsule Take 1 capsule (0.4 mg) by mouth daily 90 capsule 3     Allergies   Allergen Reactions    Sulfa Antibiotics Hives    Statins Muscle Pain (Myalgia)             Review of Systems   Constitutional:  Negative for chills and fever.   HENT:  Positive for congestion. Negative for ear pain, hearing loss and sore throat.    Eyes:  Negative for pain and visual disturbance.   Respiratory:  Positive for cough. Negative for shortness of breath.    Cardiovascular:  Negative for chest pain, palpitations and peripheral edema.   Gastrointestinal:  Negative for abdominal pain, constipation, diarrhea, heartburn, hematochezia and nausea.   Genitourinary:  Negative for dysuria, frequency, genital sores, hematuria, impotence, penile discharge and urgency.   Musculoskeletal:  Positive for arthralgias. Negative for joint swelling and myalgias.   Skin:  Negative for rash.   Neurological:  Negative for dizziness, weakness, headaches and paresthesias.   Psychiatric/Behavioral:  Negative for mood changes. The patient is not nervous/anxious.          OBJECTIVE:   /74 (BP Location: Right arm, Patient Position: Sitting, Cuff Size: Adult Large)   Pulse 58   Temp 97.5  F (36.4  C) (Oral)   Resp 24   Ht 1.74 m (5' 8.5\")   Wt 101.6 kg (224 lb)   SpO2 96%   BMI 33.56 kg/m   Estimated body mass index is 33.56 kg/m  as calculated from the following:    Height as of this encounter: 1.74 m (5' 8.5\").    Weight as of this encounter: 101.6 kg (224 lb).  Physical Exam  GENERAL: healthy, alert and no distress  EYES: Eyes grossly normal to inspection, PERRL and conjunctivae and sclerae normal  HENT: " "ear canals and TM's normal, nose and mouth without ulcers or lesions  NECK: no adenopathy, no asymmetry, masses, or scars and thyroid normal to palpation  RESP: lungs clear to auscultation - no rales, rhonchi or wheezes  CV: regular rate and rhythm, normal S1 S2, no S3 or S4, no murmur, click or rub, no peripheral edema and peripheral pulses strong  ABDOMEN: soft, nontender, no hepatosplenomegaly, no masses and bowel sounds normal  MS: no gross musculoskeletal defects noted, no edema  SKIN: no suspicious lesions or rashes  NEURO: Normal strength and tone, mentation intact and speech normal  PSYCH: mentation appears normal, affect normal/bright  Diabetic foot exam: normal DP and PT pulses, no trophic changes or ulcerative lesions, and normal sensory exam    Diagnostic Test Results:  Labs reviewed in Epic    ASSESSMENT / PLAN:   (Z00.00) Encounter for Medicare annual wellness exam  (primary encounter diagnosis)  Comment: declined all immunizations today     (E11.59) Type 2 diabetes mellitus with other circulatory complications (H)  Comment: reviewed diabetes management at visit last week. Normal foot exam   Plan: Adult Eye  Referral, FOOT EXAM            (R59.9) Enlarged lymph nodes  Comment: needs repeat chest CT in 3 months. Follow up with [facility]    (J18.9) Pneumonia of right lower lobe due to infectious organism  Comment: improving. Not fully recovered symptomatically but vitals and exam are improved since visit last week     Patient has been advised of split billing requirements and indicates understanding: Yes      COUNSELING:  Reviewed preventive health counseling, as reflected in patient instructions       Regular exercise       Healthy diet/nutrition      BMI:   Estimated body mass index is 33.56 kg/m  as calculated from the following:    Height as of this encounter: 1.74 m (5' 8.5\").    Weight as of this encounter: 101.6 kg (224 lb).   Weight management plan: Discussed healthy diet and exercise " guidelines      He reports that he has quit smoking. He has never been exposed to tobacco smoke. He has never used smokeless tobacco.      Appropriate preventive services were discussed with this patient, including applicable screening as appropriate for cardiovascular disease, diabetes, osteopenia/osteoporosis, and glaucoma.  As appropriate for age/gender, discussed screening for colorectal cancer, prostate cancer, breast cancer, and cervical cancer. Checklist reviewing preventive services available has been given to the patient.    Reviewed patients plan of care and provided an AVS. The Basic Care Plan (routine screening as documented in Health Maintenance) for Andrew meets the Care Plan requirement. This Care Plan has been established and reviewed with the Patient and spouse.          Safia Morin [facility]    Identified Health Risks:  I have reviewed Opioid Use Disorder and Substance Use Disorder risk factors and made any needed referrals.

## 2023-09-20 NOTE — PATIENT INSTRUCTIONS
You will get a call to schedule with optometry in [place]     Recommend getting a tetanus vaccine at pharmacy. Also advise pneumonia vaccine.           Patient Education   Personalized Prevention Plan  You are due for the preventive services outlined below.  Your care team is available to assist you in scheduling these services.  If you have already completed any of these items, please share that information with your care team to update in your medical record.  Health Maintenance Due   Topic Date Due    COVID-19 Vaccine (1) Never done    Zoster (Shingles) Vaccine (1 of 2) Never done    LUNG CANCER SCREENING  Never done    Pneumococcal Vaccine (2 - PCV) 05/06/2010    Eye Exam  08/03/2016    Diptheria Tetanus Pertussis (DTAP/TDAP/TD) Vaccine (5 - Td or Tdap) 05/06/2019    Annual Wellness Visit  01/14/2023    Diabetic Foot Exam  01/14/2023    ANNUAL REVIEW OF HM ORDERS  01/14/2023    FALL RISK ASSESSMENT  01/14/2023    Flu Vaccine (1) Never done     Preventive Health Recommendations  See your health care provider every year to  Review health changes.   Discuss preventive care.    Review your medicines if your doctor has prescribed any.  Talk with your health care provider about whether you should have a test to screen for prostate cancer (PSA).  Every 3 years, have a diabetes test (fasting glucose). If you are at risk for diabetes, you should have this test more often.  Every 5 years, have a cholesterol test. Have this test more often if you are at risk for high cholesterol or heart disease.   Every 10 years, have a colonoscopy. Or, have a yearly FIT test (stool test). These exams will check for colon cancer.  Talk to with your health care provider about screening for Abdominal Aortic Aneurysm if you have a family history of AAA or have a history of smoking.    Shots:   Get a flu shot each year.   Get a tetanus shot every 10 years.   Talk to your doctor about your pneumonia vaccines. There are now two you should  receive - Pneumovax (PPSV 23) and Prevnar (PCV 13).  Talk to your pharmacist about a shingles vaccine.   Talk to your doctor about the hepatitis B vaccine.    Nutrition:   Eat at least 5 servings of fruits and vegetables each day.   Eat whole-grain bread, whole-wheat pasta and brown rice instead of white grains and rice.   Get adequate Calcium and Vitamin D.     Lifestyle  Exercise for at least 150 minutes a week (30 minutes a day, 5 days a week). This will help you control your weight and prevent disease.   Limit alcohol to one drink per day.   No smoking.   Wear sunscreen to prevent skin cancer.   See your dentist every six months for an exam and cleaning.   See your eye doctor every 1 to 2 years to screen for conditions such as glaucoma, macular degeneration and cataracts.    Personalized Prevention Plan  You are due for the preventive services outlined below.  Your care team is available to assist you in scheduling these services.  If you have already completed any of these items, please share that information with your care team to update in your medical record.  Health Maintenance   Topic Date Due    COVID-19 Vaccine (1) Never done    ZOSTER IMMUNIZATION (1 of 2) Never done    LUNG CANCER SCREENING  Never done    Pneumococcal Vaccine: 65+ Years (2 - PCV) 05/06/2010    EYE EXAM  08/03/2016    DTAP/TDAP/TD IMMUNIZATION (5 - Td or Tdap) 05/06/2019    MEDICARE ANNUAL WELLNESS VISIT  01/14/2023    DIABETIC FOOT EXAM  01/14/2023    ANNUAL REVIEW OF HM ORDERS  01/14/2023    FALL RISK ASSESSMENT  01/14/2023    INFLUENZA VACCINE (1) Never done    BMP  01/18/2024    MICROALBUMIN  01/18/2024    A1C  03/13/2024    LIPID  09/13/2024    COLORECTAL CANCER SCREENING  01/12/2027    ADVANCE CARE PLANNING  01/14/2027    HEPATITIS C SCREENING  Completed    PHQ-2 (once per calendar year)  Completed    AORTIC ANEURYSM SCREENING (SYSTEM ASSIGNED)  Completed    IPV IMMUNIZATION  Aged Out    HPV IMMUNIZATION  Aged Out    MENINGITIS  IMMUNIZATION  Aged Out

## 2023-10-30 ENCOUNTER — NURSE TRIAGE (OUTPATIENT)
Dept: FAMILY MEDICINE | Facility: CLINIC | Age: 73
End: 2023-10-30

## 2023-10-30 ENCOUNTER — OFFICE VISIT (OUTPATIENT)
Dept: FAMILY MEDICINE | Facility: CLINIC | Age: 73
End: 2023-10-30
Payer: COMMERCIAL

## 2023-10-30 ENCOUNTER — ANCILLARY PROCEDURE (OUTPATIENT)
Dept: GENERAL RADIOLOGY | Facility: CLINIC | Age: 73
End: 2023-10-30
Attending: PHYSICIAN ASSISTANT
Payer: COMMERCIAL

## 2023-10-30 VITALS
HEART RATE: 79 BPM | WEIGHT: 224 LBS | SYSTOLIC BLOOD PRESSURE: 138 MMHG | OXYGEN SATURATION: 95 % | DIASTOLIC BLOOD PRESSURE: 86 MMHG | RESPIRATION RATE: 24 BRPM | BODY MASS INDEX: 33.18 KG/M2 | TEMPERATURE: 97 F | HEIGHT: 69 IN

## 2023-10-30 DIAGNOSIS — R05.1 ACUTE COUGH: ICD-10-CM

## 2023-10-30 DIAGNOSIS — R05.1 ACUTE COUGH: Primary | ICD-10-CM

## 2023-10-30 LAB
FLUAV AG SPEC QL IA: NEGATIVE
FLUBV AG SPEC QL IA: NEGATIVE

## 2023-10-30 PROCEDURE — 87635 SARS-COV-2 COVID-19 AMP PRB: CPT | Performed by: PHYSICIAN ASSISTANT

## 2023-10-30 PROCEDURE — 87804 INFLUENZA ASSAY W/OPTIC: CPT | Performed by: PHYSICIAN ASSISTANT

## 2023-10-30 PROCEDURE — 71046 X-RAY EXAM CHEST 2 VIEWS: CPT | Mod: TC | Performed by: RADIOLOGY

## 2023-10-30 PROCEDURE — 99214 OFFICE O/P EST MOD 30 MIN: CPT | Performed by: PHYSICIAN ASSISTANT

## 2023-10-30 RX ORDER — ALBUTEROL SULFATE 90 UG/1
1-2 AEROSOL, METERED RESPIRATORY (INHALATION) EVERY 4 HOURS PRN
Qty: 6.7 G | Refills: 0 | Status: SHIPPED | OUTPATIENT
Start: 2023-10-30 | End: 2024-03-11

## 2023-10-30 RX ORDER — AZITHROMYCIN 250 MG/1
TABLET, FILM COATED ORAL
Qty: 6 TABLET | Refills: 0 | Status: SHIPPED | OUTPATIENT
Start: 2023-10-30 | End: 2023-11-04

## 2023-10-30 NOTE — TELEPHONE ENCOUNTER
"Nurse Triage SBAR    Is this a 2nd Level Triage? NO    Situation:   Patient believes his pneumonia relapsed     Background:   patient was in ED 9/1/23 for pneumonia     Assessment: Wife called (NO consent, patient gave verbal)   They were checking the status of refills for his doxycycline hyclate (VIBRA-TABS) 100 MG tablet  and cefuroxime (CEFTIN) 500 MG tablet.     Patient was put on the line to triage. He stated he has a severe cough. This started a week ago and is getting worse. Mucous production is white and mild yellow in color. Denies: Green mucous, blood in sputum, fevers. He does report he gets SOB with coughing.      RN viewed pcps note 9/13/23 \"Instructions    Expect a call from [facility] lung nodule clinic. You need a repeat CT scan of your chest in 3 months.      Take 5 more days of antibiotics for your pneumonia. Good oxygen levels today.           Protocol Recommended Disposition:   See in Office Today    Recommendation:   To be seen today. He agreed.   RN viewed [place] office for open visits, no available. Patient was agreeable to come to the [place] [facility] to be assessed today with Mg Allen.         Does the patient meet one of the following criteria for ADS visit consideration? No    Selena Zhoa RN on 10/30/2023 at 12:21 PM        Reason for Disposition   SEVERE coughing spells (e.g., whooping sound after coughing, vomiting after coughing)    Additional Information   Negative: Bluish (or gray) lips or face   Negative: SEVERE difficulty breathing (e.g., struggling for each breath, speaks in single words)   Negative: Rapid onset of cough and has hives   Negative: Coughing started suddenly after medicine, an allergic food or bee sting   Negative: Difficulty breathing after exposure to flames, smoke, or fumes   Negative: Sounds like a life-threatening emergency to the triager   Negative: Previous asthma attacks and this feels like asthma attack   Negative: Dry cough (non-productive; no sputum or " "minimal clear sputum) and within 14 days of COVID-19 Exposure   Negative: MODERATE difficulty breathing (e.g., speaks in phrases, SOB even at rest, pulse 100-120) and still present when not coughing   Negative: Chest pain present when not coughing   Negative: Passed out (i.e., fainted, collapsed and was not responding)   Negative: Patient sounds very sick or weak to the triager   Negative: MILD difficulty breathing (e.g., minimal/no SOB at rest, SOB with walking, pulse <100) and still present when not coughing   Negative: Coughed up > 1 tablespoon (15 ml) blood (Exception: Blood-tinged sputum.)   Negative: Fever > 103 F (39.4 C)   Negative: Fever > 101 F (38.3 C) and over 60 years of age   Negative: Fever > 100.0 F (37.8 C) and has diabetes mellitus or a weak immune system (e.g., HIV positive, cancer chemotherapy, organ transplant, splenectomy, chronic steroids)   Negative: Fever > 100.0 F (37.8 C) and bedridden (e.g., CVA, chronic illness, recovering from surgery)   Negative: Increasing ankle swelling   Negative: Wheezing is present    Answer Assessment - Initial Assessment Questions  1. ONSET: \"When did the cough begin?\"       Started about a week ago   2. SEVERITY: \"How bad is the cough today?\"       Severe   3. SPUTUM: \"Describe the color of your sputum\" (none, dry cough; clear, white, yellow, green)      White and sometimes a little yellow  4. HEMOPTYSIS: \"Are you coughing up any blood?\" If so ask: \"How much?\" (flecks, streaks, tablespoons, etc.)      NO  5. DIFFICULTY BREATHING: \"Are you having difficulty breathing?\" If Yes, ask: \"How bad is it?\" (e.g., mild, moderate, severe)     - MILD: No SOB at rest, mild SOB with walking, speaks normally in sentences, can lie down, no retractions, pulse < 100.     - MODERATE: SOB at rest, SOB with minimal exertion and prefers to sit, cannot lie down flat, speaks in phrases, mild retractions, audible wheezing, pulse 100-120.     - SEVERE: Very SOB at rest, speaks in single " "words, struggling to breathe, sitting hunched forward, retractions, pulse > 120       Mild   6. FEVER: \"Do you have a fever?\" If Yes, ask: \"What is your temperature, how was it measured, and when did it start?\"      NO  7. CARDIAC HISTORY: \"Do you have any history of heart disease?\" (e.g., heart attack, congestive heart failure)       High BP  8. LUNG HISTORY: \"Do you have any history of lung disease?\"  (e.g., pulmonary embolus, asthma, emphysema)      NO  9. PE RISK FACTORS: \"Do you have a history of blood clots?\" (or: recent major surgery, recent prolonged travel, bedridden)      Stroke in 2020  10. OTHER SYMPTOMS: \"Do you have any other symptoms?\" (e.g., runny nose, wheezing, chest pain)        Mild runny nose   11. PREGNANCY: \"Is there any chance you are pregnant?\" \"When was your last menstrual period?\"        NO  12. TRAVEL: \"Have you traveled out of the country in the last month?\" (e.g., travel history, exposures)        No    Protocols used: Cough-A-OH    "

## 2023-10-30 NOTE — LETTER
November 1, 2023      Bhavin Torres  [address] [address]        Dear ,    We are writing to inform you of your test results.    Your test results fall within the expected range(s) or remain unchanged from previous results.  Please continue with current treatment plan.    Results for orders placed or performed in visit on 10/30/23   XR Chest 2 Views     Status: None    Narrative    EXAM: XR CHEST 2 VIEWS  LOCATION: [facility]  DATE: 10/30/2023    INDICATION:  Acute cough  COMPARISON: 02/28/2023 and older studies      Impression    IMPRESSION: Poststernotomy changes again noted. Lungs are clear. Heart and pulmonary vascularity are normal. No signs of acute disease. Exaggeration of thoracic kyphosis without vertebral body compression fractures.   Results for orders placed or performed in visit on 10/30/23   Symptomatic COVID-19 Virus (Coronavirus) by PCR Nose     Status: Normal    Specimen: Nose; Swab   Result Value Ref Range    SARS CoV2 PCR Negative Negative    Narrative    Testing was performed using the [employer] SARS-CoV-2 Assay on the  [employer] Instrument System. Additional information about this  Emergency Use Authorization (EUA) assay can be found via the Lab  Guide. This test should be ordered for the detection of SARS-CoV-2 in  individuals who meet SARS-CoV-2 clinical and/or epidemiological  criteria. Test performance is unknown in asymptomatic patients. This  test is for in vitro diagnostic use under the FDA EUA for  laboratories certified under CLIA to perform high complexity testing.  This test has not been FDA cleared or approved. A negative result  does not rule out the presence of PCR inhibitors in the specimen or  target RNA in concentration below the limit of detection for the  assay. The possibility of a false negative should be considered if  the patient's recent exposure or clinical presentation suggests  COVID-19. This test was  validated by the [facility]. This laboratory is certified under  the Clinical Laboratory Improvement Amendments of 1988 (CLIA-88) as  qualified to perform high complexity laboratory testing.   Influenza A/B antigen     Status: Normal    Specimen: Nose; Swab   Result Value Ref Range    Influenza A antigen Negative Negative    Influenza B antigen Negative Negative    Narrative    Test results must be correlated with clinical data. If necessary, results should be confirmed by a molecular assay or viral culture.         If you have any questions or concerns, please call the clinic at the number listed above.       Sincerely,      URI Montez

## 2023-10-30 NOTE — PATIENT INSTRUCTIONS
Chelsea Delcid,    Thank you for allowing [facility] to manage your care.    I am unsure of the cause of your symptoms, but your exam is reassuring. We will see what our workup shows. This is likely a viral respiratory infection or mild pneumonia.    If you develop worsening/changing symptoms at any time, please be seen in clinic/urgent care or call 911/go to the emergency department for evaluation as we discussed.    I ordered some xrays. Please go to our radiology department to get your xrays.    I sent your prescriptions to your pharmacy. Take a probiotic pill, eat live culture yogurt (Greek yogurt or Activia), drink kefir daily for one week after finishing the antibiotics to encourage growth of good bacteria in your system.    Please allow 1-2 business days for our office to contact you in regards to your laboratory/radiological studies.  If not done so, I encourage you to login into [employer] ([email or web address] to review your results as well.     Drink 8-10 glasses of fluid daily to stay well-hydrated.    If you have any questions or concerns, please feel free to call us at [phone]    Sincerely,    Mg Allen PA-C    Did you know?      You can schedule a video visit for follow-up appointments as well as future appointments for certain conditions.  Please see the below link.     [email or web address]    If you have not already done so,  I encourage you to sign up for [employer] ([email or web address]).  This will allow you to review your results, securely communicate with a provider, and schedule virtual visits as well.

## 2023-10-30 NOTE — PROGRESS NOTES
Assessment & Plan   Problem List Items Addressed This Visit    None  Visit Diagnoses       Acute cough    -  Primary    Relevant Medications    azithromycin (ZITHROMAX) 250 MG tablet    albuterol (PROAIR HFA/PROVENTIL HFA/VENTOLIN HFA) 108 (90 Base) MCG/ACT inhaler    Other Relevant Orders    XR Chest 2 Views    Influenza A/B antigen    Symptomatic COVID-19 Virus (Coronavirus) by PCR           Impression is likely atypical CAP vs viral URI including COVID-19. Will order COVID-19 PCR. Flu negative. Appears well and non-toxic and I have low suspicion for impending airway obstruction or respiratory distress at this point. CXR shows no pneumonia, pneumothorax, pleural effusion, edema, or other worrisome process by my read with rad read pending. He will push p.o. fluids, use over-the-counter meds for symptoms, complete a course of azithromycin to treat atypical CAP, albuterol inhaler and follow-up with us in 1-2 weeks if not improving or urgent care/the ER if symptoms worsen/change at any time.    Complete history and physical exam as below. Afebrile with normal vital signs.    DDx and Dx discussed with and explained to the pt to their satisfaction.  All questions were answered at this time. Pt expressed understanding of and agreement with this dx, tx, and plan. No further workup warranted and standard medication warnings given. I have given the patient a list of pertinent indications for re-evaluation. Will go to the Emergency Department if symptoms worsen or new concerning symptoms arise. Patient left in no apparent distress.     Ordering of each unique test  Prescription drug management  23 minutes spent by me on the date of the encounter doing chart review, history and exam, documentation and further activities per the note     See Patient Instructions    URI Montez  [facility] CECIL Delcid is a 73 year old, presenting for the following health issues:  Pneumonia       "10/30/2023     3:25 PM   Additional Questions   Roomed by Vickie LUNA   Accompanied by Wife         10/30/2023     3:25 PM   Patient Reported Additional Medications   Patient reports taking the following new medications No new medications to add       HPI   Concern - Pneumonia  Onset: states this has been ongoing since ER visit 09/01/2023, symptoms never really went way completely but he got worse again x 1 week  Description: Cough  Intensity: moderate  Progression of Symptoms:  worsening  Accompanying Signs & Symptoms: none  Previous history of similar problem: previous history  Precipitating factors:        Worsened by: dyspnea on exertion  Alleviating factors:        Improved by: unknown  Therapies tried and outcome: Nyquil  No sore throat, earache, swelling in legs, hemoptysis, abd pain, chest pain    Review of Systems   Constitutional, HEENT, cardiovascular, pulmonary, gi and gu systems are negative, except as otherwise noted.      Objective    /86 (BP Location: Left arm, Patient Position: Sitting, Cuff Size: Adult Large)   Pulse 79   Temp 97  F (36.1  C) (Tympanic)   Resp 24   Ht 1.74 m (5' 8.5\")   Wt 101.6 kg (224 lb)   SpO2 95%   BMI 33.56 kg/m    Body mass index is 33.56 kg/m .  Physical Exam  Vitals and nursing note reviewed.   Constitutional:       General: He is not in acute distress.     Appearance: He is not ill-appearing or diaphoretic.   HENT:      Head: Normocephalic and atraumatic.      Mouth/Throat:      Mouth: Mucous membranes are moist.      Pharynx: Oropharynx is clear.      Comments: No mastoid or external ear tenderness.  Eyes:      Conjunctiva/sclera: Conjunctivae normal.   Cardiovascular:      Rate and Rhythm: Normal rate and regular rhythm.      Heart sounds: Normal heart sounds. No murmur heard.     No friction rub. No gallop.      Comments: 2+ symmetric radial pulses. No LE edema or tenderness.  Pulmonary:      Effort: Pulmonary effort is normal. No respiratory distress.     "  Breath sounds: Normal breath sounds. No stridor. No wheezing, rhonchi or rales.   Musculoskeletal:      Comments: Kyphotic thoracic spine.    Skin:     General: Skin is warm and dry.   Neurological:      General: No focal deficit present.      Mental Status: He is alert. Mental status is at baseline.   Psychiatric:         Mood and Affect: Mood normal.         Behavior: Behavior normal.          Results for orders placed or performed in visit on 10/30/23   Influenza A/B antigen     Status: Normal    Specimen: Nose; Swab   Result Value Ref Range    Influenza A antigen Negative Negative    Influenza B antigen Negative Negative    Narrative    Test results must be correlated with clinical data. If necessary, results should be confirmed by a molecular assay or viral culture.     CXR shows no pneumonia, pneumothorax, pleural effusion, edema, or other worrisome process. Sternotomy wires present.

## 2023-10-31 ENCOUNTER — TELEPHONE (OUTPATIENT)
Dept: FAMILY MEDICINE | Facility: CLINIC | Age: 73
End: 2023-10-31
Payer: COMMERCIAL

## 2023-10-31 LAB — SARS-COV-2 RNA RESP QL NAA+PROBE: NEGATIVE

## 2023-10-31 NOTE — TELEPHONE ENCOUNTER
Called pharmacy - we cannot refill these 2 antibiotics.        Pt was seen 10/30 with Mg Allen who already sent in z pack and albuterol.        Elisabet, RN    Triage Nurse  [facility]

## 2023-10-31 NOTE — TELEPHONE ENCOUNTER
[id number] faxed a Patient Request for RX Refills re:   - Doxycycline Hyclate 100 MG Oral Tablet   - cefuroxime (CEFTIN) 500 MG tablet     Pharmacy Notes to Prescriber:  Patient request, Please advise.  Thanks!

## 2023-11-06 ENCOUNTER — TELEPHONE (OUTPATIENT)
Dept: FAMILY MEDICINE | Facility: CLINIC | Age: 73
End: 2023-11-06
Payer: COMMERCIAL

## 2023-11-06 DIAGNOSIS — R05.1 ACUTE COUGH: Primary | ICD-10-CM

## 2023-11-06 NOTE — TELEPHONE ENCOUNTER
"Patient was seen in the clinic by Mg GREWAL on 10/30/23.   He was prescribed Azithromycin and Albuterol for Pneumonia.     He completed the Azithromycin on 11/3/23.   He never filled the Albuterol due to cost.     Patient called this afternoon because his cough is only a \"bit better\" and he remains congested. He occasionally coughs up thick, white secretions. He sleeps on and off at night due to the cough. He reports feeling somewhat short of breath after a bad coughing attack.     Patient has no fever or pain.    He is drinking plenty of fluids.     He is asking what else can be prescribed to help with his cough.     Please review and advise.     Sharda LUCERO  [facility]         "

## 2023-11-07 RX ORDER — BENZONATATE 100 MG/1
100 CAPSULE ORAL 3 TIMES DAILY PRN
Qty: 25 CAPSULE | Refills: 0 | Status: SHIPPED | OUTPATIENT
Start: 2023-11-07 | End: 2024-03-11

## 2023-11-07 NOTE — TELEPHONE ENCOUNTER
Will send benzonatate to his pharmacy. Do not use this medication while driving, operating machinery, with other sedating medications, or while drinking alcohol as it will make you drowsy. Please update patient. Thanks.

## 2023-11-07 NOTE — TELEPHONE ENCOUNTER
Called patient and left a voicemail to return our call to the clinic.       Bryanna Beauchamp RN on 11/7/2023 at 9:59 AM

## 2023-11-08 NOTE — TELEPHONE ENCOUNTER
Notified patient of the information below per Igor Allen PA-C.    Patient stated understanding and agreeable with the plan of care.     Lizzie RN BSN  Triage Nurse  [facility]

## 2023-11-18 NOTE — PROGRESS NOTES
Can you let pt know I ordered a 4 wheeled walker for him based on recommendations with PT. Can get from medical device store. Thanks,   Safia Morin DO    temporal

## 2024-03-11 ENCOUNTER — OFFICE VISIT (OUTPATIENT)
Dept: FAMILY MEDICINE | Facility: CLINIC | Age: 74
End: 2024-03-11
Payer: COMMERCIAL

## 2024-03-11 VITALS
SYSTOLIC BLOOD PRESSURE: 132 MMHG | HEART RATE: 52 BPM | BODY MASS INDEX: 34.36 KG/M2 | WEIGHT: 232 LBS | RESPIRATION RATE: 20 BRPM | TEMPERATURE: 97.5 F | DIASTOLIC BLOOD PRESSURE: 64 MMHG | OXYGEN SATURATION: 98 % | HEIGHT: 69 IN

## 2024-03-11 DIAGNOSIS — I20.89 STABLE ANGINA PECTORIS (H): Primary | ICD-10-CM

## 2024-03-11 DIAGNOSIS — R60.0 BILATERAL LEG EDEMA: ICD-10-CM

## 2024-03-11 DIAGNOSIS — I10 ESSENTIAL HYPERTENSION WITH GOAL BLOOD PRESSURE LESS THAN 140/90: ICD-10-CM

## 2024-03-11 DIAGNOSIS — I25.810 CORONARY ARTERY DISEASE INVOLVING CORONARY BYPASS GRAFT OF NATIVE HEART WITHOUT ANGINA PECTORIS: ICD-10-CM

## 2024-03-11 DIAGNOSIS — E11.59 TYPE 2 DIABETES MELLITUS WITH OTHER CIRCULATORY COMPLICATIONS (H): ICD-10-CM

## 2024-03-11 DIAGNOSIS — G81.94 LEFT HEMIPARESIS (H): ICD-10-CM

## 2024-03-11 DIAGNOSIS — I73.9 PAD (PERIPHERAL ARTERY DISEASE) (H): ICD-10-CM

## 2024-03-11 PROBLEM — I63.9 ACUTE CVA (CEREBROVASCULAR ACCIDENT) (H): Status: ACTIVE | Noted: 2018-02-20

## 2024-03-11 PROBLEM — K42.9 UMBILICAL HERNIA: Status: ACTIVE | Noted: 2024-03-11

## 2024-03-11 PROBLEM — R73.03 PREDIABETES: Status: ACTIVE | Noted: 2024-03-11

## 2024-03-11 LAB
CREAT UR-MCNC: 88.8 MG/DL
HBA1C MFR BLD: 6.1 % (ref 0–5.6)
MICROALBUMIN UR-MCNC: 17.5 MG/L
MICROALBUMIN/CREAT UR: 19.71 MG/G CR (ref 0–17)

## 2024-03-11 PROCEDURE — 83036 HEMOGLOBIN GLYCOSYLATED A1C: CPT | Performed by: PHYSICIAN ASSISTANT

## 2024-03-11 PROCEDURE — 82043 UR ALBUMIN QUANTITATIVE: CPT | Performed by: PHYSICIAN ASSISTANT

## 2024-03-11 PROCEDURE — 82570 ASSAY OF URINE CREATININE: CPT | Performed by: PHYSICIAN ASSISTANT

## 2024-03-11 PROCEDURE — 99215 OFFICE O/P EST HI 40 MIN: CPT | Performed by: PHYSICIAN ASSISTANT

## 2024-03-11 PROCEDURE — 36415 COLL VENOUS BLD VENIPUNCTURE: CPT | Performed by: PHYSICIAN ASSISTANT

## 2024-03-11 RX ORDER — RESPIRATORY SYNCYTIAL VIRUS VACCINE 120MCG/0.5
0.5 KIT INTRAMUSCULAR ONCE
Qty: 1 EACH | Refills: 0 | Status: CANCELLED | OUTPATIENT
Start: 2024-03-11 | End: 2024-03-11

## 2024-03-11 ASSESSMENT — PAIN SCALES - GENERAL: PAINLEVEL: NO PAIN (0)

## 2024-03-11 NOTE — LETTER
March 12, 2024      Bhavin Torres  [address] [address]        Dear ,    We are writing to inform you of your test results.    Labs are consistent with diabetic kidney disease, you have some protein in your urine, but otherwise your kidneys are functioning well. Your A1c has come down to 6.1%, this is good. Continue with diabetic diet.     Resulted Orders   Albumin Random Urine Quantitative with Creat Ratio   Result Value Ref Range    Creatinine Urine mg/dL 88.8 mg/dL      Comment:      The reference ranges have not been established in urine creatinine. The results should be integrated into the clinical context for interpretation.    Albumin Urine mg/L 17.5 mg/L      Comment:      The reference ranges have not been established in urine albumin. The results should be integrated into the clinical context for interpretation.    Albumin Urine mg/g Cr 19.71 (H) 0.00 - 17.00 mg/g Cr      Comment:      Microalbuminuria is defined as an albumin:creatinine ratio of 17 to 299 for males and 25 to 299 for females. A ratio of albumin:creatinine of 300 or higher is indicative of overt proteinuria.  Due to biologic variability, positive results should be confirmed by a second, first-morning random or 24-hour timed urine specimen. If there is discrepancy, a third specimen is recommended. When 2 out of 3 results are in the microalbuminuria range, this is evidence for incipient nephropathy and warrants increased efforts at glucose control, blood pressure control, and institution of therapy with an angiotensin-converting-enzyme (ACE) inhibitor (if the patient can tolerate it).     HEMOGLOBIN A1C   Result Value Ref Range    Hemoglobin A1C 6.1 (H) 0.0 - 5.6 %      Comment:      Normal <5.7%   Prediabetes 5.7-6.4%    Diabetes 6.5% or higher     Note: Adopted from ADA consensus guidelines.       If you have any questions or concerns, please call the clinic at the number listed above.        Sincerely,      Quyen Tafoya PA

## 2024-03-11 NOTE — PROGRESS NOTES
Assessment & Plan     Stable angina pectoris  I reviewed ER notes from 3/6/24 where patient presented with chest pain with exertion. Work up showed some atypical EKG findings but nothing significant. Troponin levels were initially elevated but stable. Chest xray normal. Other CBC, and CMP were unremarkable. He was given IV labetalol and blood pressure did come down. Due to his complex history he was high risk and advised to be admitted but patient declined. Since discharge he has been having on and off chest pain with excretion still. He is in no acute stress today. I think we can do this outpatient with a stress test to evaluate any new changes.  However, advised him and if wife to have a low threshold for going back to ER such as worsening chest pain, or chest pain that doesn't resolve. Patient agree's with this plan and has no further questions  - NM Lexiscan stress test; Future    Coronary artery disease involving coronary bypass graft of native heart without angina pectoris  See plan above  - NM Lexiscan stress test; Future    Type 2 diabetes mellitus with other circulatory complications (H)  Currently on no medications. Last A1c was 6.5. We discussed diabetic diet today. Pending A1c today may need to consider medication. Patient agree's with this plan and has no further questions  - Albumin Random Urine Quantitative with Creat Ratio; Future  - HEMOGLOBIN A1C; Future  - Albumin Random Urine Quantitative with Creat Ratio  - HEMOGLOBIN A1C    Bilateral leg edema  Encourage legs elevated at rest and compression stockings    Essential hypertension with goal blood pressure less than 140/90  Elevated at ER last week. He had not been taking medication prior to that. He has not restarted medications for about 1 weeks. Blood pressure has improved. Advised to continue to check blood pressure at home and if continues to be elevated follow up in clinic.  Instructed the importance of taking blood pressure medication.  "    Left hemiparesis (H)  No active symptoms    PAD (peripheral artery disease) (H24)  Chronic, stable      40 minutes spent by me on the date of the encounter doing chart review, history and exam, documentation and further activities per the note    MED REC REQUIRED  Post Medication Reconciliation Status: discharge medications reconciled, continue medications without change  BMI  Estimated body mass index is 34.76 kg/m  as calculated from the following:    Height as of this encounter: 1.74 m (5' 8.5\").    Weight as of this encounter: 105.2 kg (232 lb).           Teagan Delcid is a 74 year old, presenting for the following health issues:  ER F/U    HPI       ED/UC Followup:    Facility:  [facility]   Date of visit: 3/6/2024  Reason for visit: Chest Pain   Current Status: Patient voices still having chest pain only with exertion such as walking or getting up from sitting. The pain is a 2/10. The pain does not radiate any where. He denies, SOB, gait changes, or headache. He has not taken his Nitro medication    Hypertension Follow-up    Do you check your blood pressure regularly outside of the clinic? No   Are you following a low salt diet? No  Are your blood pressures ever more than 140 on the top number (systolic) OR more   than 90 on the bottom number (diastolic), for example 140/90? Yes  Patient has not been taking his medication for a few months prior to being seen at the ER on 3/6/24. He restarted medication last Wednesday. His blood pressure has come down and he is overall feeling better. He is currently taking Lisinopril, Coreg. Norvasc.       Review of Systems  Constitutional, HEENT, cardiovascular, pulmonary, gi and gu systems are negative, except as otherwise noted.      Objective    BP (!) 146/66   Pulse 52   Temp 97.5  F (36.4  C) (Oral)   Resp 20   Ht 1.74 m (5' 8.5\")   Wt 105.2 kg (232 lb)   SpO2 98%   BMI 34.76 kg/m    Body mass index is 34.76 kg/m .  Physical Exam   GENERAL: alert and " no distress  EYES: Eyes grossly normal to inspection, PERRL and conjunctivae and sclerae normal  NECK: no adenopathy, no asymmetry, masses, or scars  RESP: lungs clear to auscultation - no rales, rhonchi or wheezes  CV: regular rate and rhythm, normal S1 S2, no S3 or S4, no murmur, click or rub, no peripheral edema  MS: 1+ edema to bilateral lower legs            Signed Electronically by: URI Granados

## 2024-03-11 NOTE — PATIENT INSTRUCTIONS
Bhavin,    Start checking your blood pressures at home over the next 1-2 weeks. If continues to be 140/90 or above please follow back up in clinic as we need to make blood pressure medication adjustments.  Schedule your Heart Stress Test  Work on a diabetic diet - information given today   Start wearing compression stockings and keeping legs elevated at rest.    Thank you,  Quyen Tafoya PA-C

## 2024-03-29 ENCOUNTER — VIRTUAL VISIT (OUTPATIENT)
Dept: FAMILY MEDICINE | Facility: CLINIC | Age: 74
End: 2024-03-29
Payer: COMMERCIAL

## 2024-03-29 DIAGNOSIS — Z09 HOSPITAL DISCHARGE FOLLOW-UP: Primary | ICD-10-CM

## 2024-03-29 DIAGNOSIS — E11.59 TYPE 2 DIABETES MELLITUS WITH OTHER CIRCULATORY COMPLICATIONS (H): ICD-10-CM

## 2024-03-29 DIAGNOSIS — E78.2 MIXED HYPERLIPIDEMIA: ICD-10-CM

## 2024-03-29 DIAGNOSIS — I48.91 ATRIAL FIBRILLATION WITH RVR (H): ICD-10-CM

## 2024-03-29 DIAGNOSIS — I10 PRIMARY HYPERTENSION: ICD-10-CM

## 2024-03-29 PROCEDURE — 99443 PR PHYSICIAN TELEPHONE EVALUATION 21-30 MIN: CPT | Mod: 93 | Performed by: NURSE PRACTITIONER

## 2024-03-29 RX ORDER — LISINOPRIL 40 MG/1
40 TABLET ORAL DAILY
COMMUNITY
Start: 2024-03-15

## 2024-03-29 RX ORDER — AMIODARONE HYDROCHLORIDE 200 MG/1
200 TABLET ORAL
COMMUNITY
Start: 2024-03-26

## 2024-03-29 NOTE — PROGRESS NOTES
"Bhavin is a 74 year old who is being evaluated via a billable telephone visit.      Originating Location (pt. Location): Home    Distant Location (provider location):  Off-site    Assessment & Plan     Hospital discharge follow-up  Improved since discharge. Labs were stable, no repeat labs needed at this time.     Atrial fibrillation with RVR (H)  New diagnosis. New medications are going well without any negative s/e. States he is feeling much better. He has cardiology follow-up on 4/15/24.     Primary hypertension  Stable on medications.     Mixed hyperlipidemia  Stable on medication.     Type 2 diabetes mellitus with other circulatory complications (H)  Not on medication. A1c has been stable at 6.1.     Review of the result(s) of each unique test - hospital labs and last A1c  I spent a total of 25 minutes on the day of the visit.   Time spent by me doing chart review, history and exam, documentation and further activities per the note    MED REC REQUIRED  Post Medication Reconciliation Status:  Discharge medications reconciled, continue medications without change  BMI  Estimated body mass index is 34.76 kg/m  as calculated from the following:    Height as of 3/11/24: 1.74 m (5' 8.5\").    Weight as of 3/11/24: 105.2 kg (232 lb).         Patient Instructions   Continue medications as prescribed.     Follow-up with cardiology as planned on 4/15/24.     Teagan Delcid is a 74 year old, presenting for the following health issues:  Hospital F/U        3/29/2024    10:23 AM   Additional Questions   Roomed by Shelly   Accompanied by none         3/29/2024    10:23 AM   Patient Reported Additional Medications   Patient reports taking the following new medications started Eloquis, increased lisinopril to 40 mg     HPI       Hospital Follow-up Visit:    Hospital/Nursing Home/IP Rehab Facility:  [facility]  Date of Admission: 03/23/24  Date of Discharge: 03/26/24  Reason(s) for Admission: atrial fib    Was your hospitalization " related to COVID-19? No   Problems taking medications regularly:  none  Medication changes since discharge: increased lisinopril to 40mg, started Eloquis   Problems adhering to non-medication therapy:  None    Summary of hospitalization:  CareEverywhere information obtained and reviewed  Diagnostic Tests/Treatments reviewed.  Follow up needed: none  Other Healthcare Providers Involved in Patient s Care:         Specialist appointment - 4/15/24 with cardiology  Update since discharge: improved.         Plan of care communicated with patient           Hospital discharge summary from 3/26/24:   HOSPITALIST DISCHARGE SUMMARY [facility]     Admission Date: 3/23/2024  Discharge Date: 3/26/2024    Discharge Plan: Andrew Torres was discharged to home.    Principal Diagnosis     Atrial fibrillation with RVR (HC)    Hospital Problem List   Principal Problem:  Atrial fibrillation with RVR (HC)  Active Problems:  Hyperlipidemia LDL goal < 70  S/P CABG (coronary artery bypass graft)  Cerebrovascular accident (CVA)  HTN (hypertension)  CAD (coronary artery disease)  Diabetes mellitus, type II (HC)    Diagnoses impacting complexity:   Obesity (BMI>30): BMI 32.57, which is clinically significant due to increased nursing cares, use of resources and specialty equipment.         Hospital Course   Andrew Torres is a 74 y.o. male with a past medical history significant for CAD status post stent, PVD, type 2 diabetes mellitus, hypertension, hyperlipidemia, CVA who presented for evaluation of sudden onset of lightheadedness, dizziness, shortness of breath. Patient found to have new onset atrial fibrillation with rapid ventricular rate. TTE showed severe left atrial enlargement, mild aortic regurgitation. Normal LVEF.     Of note, patient recently had the PET/CT cardiac perfusion test done which was unremarkable, showed normal myocardial perfusion. Patient seen by EP and underwent MAGALY cardioversion on 3/25/24. He was started on  Eliquis and amiodarone. Sinus cris post cardioversion. Follow up in clinic on 4/15/24.      Recommendations for Outpatient Provider PCP: [facility]     Admission: 3/23/2024 @ [facility] [place]  Recommendations for the Outpatient Provider   Specific recommendations to be addressed at the follow up visit:  no specific recommendations, routine post-hospital and medical follow-up.    Medication regimen changes:   - Amiodarone for A fib with RVR s/p cardioversion. Started amiodarone, continue at 200 mg BID for 4 weeks, then decrease to 200 mg once daily   - Carvedilol dose decreased after starting Amio  - Start Eliquis for A fib. Stopped Asa to avoid triple therapy.    Follow-up labs/imaging: none    Other specialty follow-up not included in DC orders: None    Special considerations: none.    Functional evaluations:   Fall Risk: Total Score (If 5 or > is High Risk): 5 (03/26/24 0805)  NuDESC (>/=2 abnormal): 0 (03/26/24 0805)   MOCA: // SLUMS:             Follow-Up     Primary Care Provider follow up appointment(s)   You have an appointment with Coco Gibbons NP on Friday, March 29th, 2024 at 8:40 a.m. at [facility].     If you have questions or concerns please call the clinic at [phone].    You have been scheduled to see Coco Gibbons NP, because your primary provider is not available during the recommended time frame.   When to follow up: 1 to 5 days         Discharge Medications         Your Home Medicines       START taking these medicines     Instructions   amiodarone 200 mg tablet  For diagnoses: Atrial fibrillation with RVR (HC)  Commonly known as: CORDARONE  Take 1 Tablet (200 mg) by mouth two times daily for 4 weeks, then decrease to 200 mg once daily on 4/28/2024.    Eliquis 5 mg tablet  For diagnoses: Atrial fibrillation with RVR (HC)  Generic drug: apixaban  Take 1 Tablet (5 mg) by mouth two times daily.          CHANGE how you take these medicines      Instructions   carvediloL 12.5 mg tablet  For diagnoses: HTN (hypertension), Acute MI, inferior wall (HC), S/P CABG (coronary artery bypass graft)  What changed:   medication strength  how much to take  Commonly known as: COREG  Take 1 Tablet (12.5 mg) by mouth two times daily with meals.     ----------------------------------------------------------  Additional provider notes: Patient presents virtually via telephone for hospital follow-up. States he is doing really well on new medication regimen. No concerns or troubling symptoms. He has follow-up with cardiology 4/15/24.     Allergies   Allergen Reactions    Sulfa Antibiotics Hives    Statins Muscle Pain (Myalgia)       Current Outpatient Medications   Medication    amLODIPine (NORVASC) 10 MG tablet    ASPIRIN 81 PO    atorvastatin (LIPITOR) 40 MG tablet    carvedilol (COREG) 25 MG tablet    clopidogrel (PLAVIX) 75 MG tablet    Cyanocobalamin (B-12) 1000 MCG CAPS    lisinopril (ZESTRIL) 10 MG tablet    multivitamin, therapeutic with minerals (THERA-VIT-M) TABS tablet    nitroGLYcerin (NITROSTAT) 0.4 MG sublingual tablet    olopatadine (PATANOL) 0.1 % ophthalmic solution    oxyBUTYnin ER (DITROPAN XL) 10 MG 24 hr tablet    tamsulosin (FLOMAX) 0.4 MG capsule     No current facility-administered medications for this visit.     Facility-Administered Medications Ordered in Other Visits   Medication    barium sulfate 40% (VARIBAR NECTAR) oral suspension    barium sulfate 40% (VARIBAR THIN HONEY) oral suspension       Past Medical History:   Diagnosis Date    BPH (benign prostatic hyperplasia)     CAD (coronary artery disease)     s/p PCI multiple times and CABG    Class 2 obesity in adult     Dyslipidemia     Essential hypertension     History of stroke     residual left arm weakness    T2DM (type 2 diabetes mellitus) (H)     Urinary frequency             Review of Systems  Constitutional, HEENT, cardiovascular, pulmonary, gi and gu systems are negative, except as  "otherwise noted.      Objective    Vitals - Patient Reported  Weight (Patient Reported): 106.6 kg (235 lb)  Height (Patient Reported): 174 cm (5' 8.5\")  BMI (Based on Pt Reported Ht/Wt): 35.21  Pain Score: No Pain (0)      Vitals:  No vitals were obtained today due to virtual visit.    Physical Exam   General: Alert and no distress //Respiratory: No audible wheeze, cough, or shortness of breath // Psychiatric:  Appropriate affect, tone, and pace of words            Phone call duration: 4 minutes  Signed Electronically by: Coco Gibbons DNP    "

## 2024-08-21 ENCOUNTER — PATIENT OUTREACH (OUTPATIENT)
Dept: CARE COORDINATION | Facility: CLINIC | Age: 74
End: 2024-08-21
Payer: COMMERCIAL

## 2024-09-04 ENCOUNTER — PATIENT OUTREACH (OUTPATIENT)
Dept: CARE COORDINATION | Facility: CLINIC | Age: 74
End: 2024-09-04
Payer: COMMERCIAL

## 2025-05-08 ENCOUNTER — OFFICE VISIT (OUTPATIENT)
Dept: FAMILY MEDICINE | Facility: CLINIC | Age: 75
End: 2025-05-08
Payer: COMMERCIAL

## 2025-05-08 ENCOUNTER — TELEPHONE (OUTPATIENT)
Dept: FAMILY MEDICINE | Facility: CLINIC | Age: 75
End: 2025-05-08

## 2025-05-08 VITALS
WEIGHT: 224 LBS | BODY MASS INDEX: 32.07 KG/M2 | OXYGEN SATURATION: 99 % | SYSTOLIC BLOOD PRESSURE: 132 MMHG | DIASTOLIC BLOOD PRESSURE: 68 MMHG | HEIGHT: 70 IN | RESPIRATION RATE: 20 BRPM | TEMPERATURE: 97.6 F | HEART RATE: 56 BPM

## 2025-05-08 DIAGNOSIS — Z86.73 HISTORY OF CVA (CEREBROVASCULAR ACCIDENT): ICD-10-CM

## 2025-05-08 DIAGNOSIS — R35.0 URINARY FREQUENCY: ICD-10-CM

## 2025-05-08 DIAGNOSIS — M54.2 NECK PAIN: ICD-10-CM

## 2025-05-08 DIAGNOSIS — E11.59 TYPE 2 DIABETES MELLITUS WITH OTHER CIRCULATORY COMPLICATIONS (H): ICD-10-CM

## 2025-05-08 DIAGNOSIS — N17.9 AKI (ACUTE KIDNEY INJURY): ICD-10-CM

## 2025-05-08 DIAGNOSIS — I48.91 ATRIAL FIBRILLATION WITH RVR (H): ICD-10-CM

## 2025-05-08 DIAGNOSIS — Z09 HOSPITAL DISCHARGE FOLLOW-UP: Primary | ICD-10-CM

## 2025-05-08 DIAGNOSIS — I50.32 CHRONIC DIASTOLIC CONGESTIVE HEART FAILURE (H): ICD-10-CM

## 2025-05-08 DIAGNOSIS — I73.9 INTERMITTENT CLAUDICATION: ICD-10-CM

## 2025-05-08 DIAGNOSIS — E78.5 HYPERLIPIDEMIA LDL GOAL <70: ICD-10-CM

## 2025-05-08 DIAGNOSIS — I10 PRIMARY HYPERTENSION: ICD-10-CM

## 2025-05-08 DIAGNOSIS — R35.0 BENIGN PROSTATIC HYPERPLASIA WITH URINARY FREQUENCY: ICD-10-CM

## 2025-05-08 DIAGNOSIS — N40.1 BENIGN PROSTATIC HYPERPLASIA WITH URINARY FREQUENCY: ICD-10-CM

## 2025-05-08 DIAGNOSIS — I25.810 CORONARY ARTERY DISEASE INVOLVING CORONARY BYPASS GRAFT OF NATIVE HEART WITHOUT ANGINA PECTORIS: ICD-10-CM

## 2025-05-08 DIAGNOSIS — I10 ESSENTIAL HYPERTENSION WITH GOAL BLOOD PRESSURE LESS THAN 140/90: ICD-10-CM

## 2025-05-08 RX ORDER — CLOPIDOGREL BISULFATE 75 MG/1
75 TABLET ORAL DAILY
Qty: 90 TABLET | Refills: 3 | Status: SHIPPED | OUTPATIENT
Start: 2025-05-08

## 2025-05-08 RX ORDER — AMLODIPINE BESYLATE 10 MG/1
10 TABLET ORAL DAILY
Qty: 90 TABLET | Refills: 3 | Status: SHIPPED | OUTPATIENT
Start: 2025-05-08

## 2025-05-08 RX ORDER — TAMSULOSIN HYDROCHLORIDE 0.4 MG/1
0.4 CAPSULE ORAL DAILY
Qty: 90 CAPSULE | Refills: 3 | Status: SHIPPED | OUTPATIENT
Start: 2025-05-08

## 2025-05-08 RX ORDER — HYDRALAZINE HYDROCHLORIDE 10 MG/1
20 TABLET, FILM COATED ORAL 3 TIMES DAILY
COMMUNITY
Start: 2025-05-07

## 2025-05-08 NOTE — PROGRESS NOTES
Assessment & Plan     Hospital discharge follow-up    Coronary artery disease involving coronary bypass graft of native heart without angina pectoris  Managed by cardiology.   - amLODIPine (NORVASC) 10 MG tablet; Take 1 tablet (10 mg) by mouth daily.  - clopidogrel (PLAVIX) 75 MG tablet; Take 1 tablet (75 mg) by mouth daily.    Atrial fibrillation with RVR (H)  On eliquis. Was on amiodarone but now with bradycardia. Has zio patch for the next few days.     Type 2 diabetes mellitus with other circulatory complications (H)  A1c 6.0% during hospital stay - well controlled without medications.   - Adult Eye  Referral; Future    Primary hypertension  BP improved during visit today. Lisinopril held in hospital d/t YARON which has improved.   - amLODIPine (NORVASC) 10 MG tablet; Take 1 tablet (10 mg) by mouth daily.    YARON (acute kidney injury)  Improved. D/t overdiuresis     Intermittent claudication  Has SHARON ordered yesterday by cardiology, scheduled for 5/15/25    History of CVA (cerebrovascular accident)  Continue statin, plavix   - amLODIPine (NORVASC) 10 MG tablet; Take 1 tablet (10 mg) by mouth daily.  - clopidogrel (PLAVIX) 75 MG tablet; Take 1 tablet (75 mg) by mouth daily.    Hyperlipidemia LDL goal <70  Continue statin. Reviewed labs.     Urinary frequency  Benign prostatic hyperplasia with urinary frequency  Overall stable. Still having some issues with OAB.   - tamsulosin (FLOMAX) 0.4 MG capsule; Take 1 capsule (0.4 mg) by mouth daily.    Neck pain  Recommend biofreeze or voltaren gel and gave handout on neck exercises. He declines home PT     Chronic diastolic congestive heart failure (H)  New diagnosis during hospital. Had IV diuresis during hospital stay but did not require home diuretics. Advise monitoring weight, edema, MONROY. Overall euvolemic today         MED REC REQUIRED  Post Medication Reconciliation Status: discharge medications reconciled, continue medications without change  BMI  Estimated  "body mass index is 32.14 kg/m  as calculated from the following:    Height as of this encounter: 1.778 m (5' 10\").    Weight as of this encounter: 101.6 kg (224 lb).             Subjective   Bhavin is a 75 year old, presenting for the following health issues:  Hospital F/U    HPI          Saw [facility] yesterday 05/07/2025    Had labs done yesterday.   Recommended Zio patch for 3 days. Placed yesterday.     Medication started  Hydralazine 10 mg tablet Take 2 Tablets (20 mg) by mouth three times daily.   carvediloL 25 mg tablet bid  atorvastatin 40 mg tablet daily  apixaban 5 mg tablet  bid    On zio patch now for the next few days. Was still bradycardic at cardiology office yesterday despite stopping amiodarone.     Denies chest pain.   Has calf pain when walking bilaterally after 1/2 block, feel tight, improves after an hour.   Denies shortness of breath.   Not monitoring his edema. Denies feeling puffy.   Little lightheaded.   Denies orthopnea.   Endorses feeling achy all over.     Had BMP and FLP done yesterday with cardiology.     Trying to follow a low salt diet.endorses previously eating a lot of salt.   Eating less. Not monitoring carbs.       A1c in April 2024 was 6.0%.        Bhavin was admitted to [facility] from 4/24-4/29/25 with acute congestive heart failure and bradycardia. He presented with acute shortness of breath and elevated blood pressure. Echocardiogram showed normal ejection fraction.  He was noted to be bradycardic and amiodarone was stopped. He was treated with IV Lasix but developed YARON and lisinopril was held. He was not discharged on a diuretic. Heart rate improved with stopping amiodarone. He was started on hydralazine for blood pressure management and Coreg dose was increased.         Hospital Follow-up Visit:    Hospital/Nursing Home/IP Rehab Facility: [facility]  Date of Admission: 04/24/2025  Date of Discharge: 04/29  Reason(s) for " "Admission: was admitted on 4/24/2025 with heart failure exacerbation. presented to [place] emergency room with acute shortness of breath while working in the backyard. found to be hypertensive in the emergency room at 213/90   Was the patient in the ICU or did the patient experience delirium during hospitalization?  No  Do you have any other stressors you would like to discuss with your provider? No    Problems taking medications regularly:  None  Medication changes since discharge: START taking these medicines     Instructions   hydrALAZINE 10 mg tablet  For diagnoses: Hypertensive urgency  Commonly known as: APRESOLINE  Take 2 Tablets (20 mg) by mouth three times daily.    CHANGE how you take these medicines     Instructions   carvediloL 25 mg tablet  For diagnoses: Hypertensive urgency  What changed: when to take this  Commonly known as: COREG  Take 1 Tablet (25 mg) by mouth two times daily with meals.   Problems adhering to non-medication therapy:  None    Summary of hospitalization:  CareEverywhere information obtained and reviewed  Diagnostic Tests/Treatments reviewed.  Follow up needed: BMP  Other Healthcare Providers Involved in Patient s Care:         Specialist appointment - cardiology   Update since discharge: stable.         Plan of care communicated with patient and family                 ROS neg except for HPI      Objective    /68   Pulse 56   Temp 97.6  F (36.4  C) (Oral)   Resp 20   Ht 1.778 m (5' 10\")   Wt 101.6 kg (224 lb)   SpO2 99%   BMI 32.14 kg/m    Body mass index is 32.14 kg/m .  Physical Exam   GENERAL: alert and no distress  NECK: no adenopathy, no asymmetry, masses, or scars  RESP: lungs clear to auscultation - no rales, rhonchi or wheezes  CV: regular rate and rhythm, normal S1 S2, no S3 or S4, no murmur, click or rub, trace peripheral edema  ABDOMEN: soft, nontender, no hepatosplenomegaly, no masses and bowel sounds normal  MS: no gross musculoskeletal defects noted, trace " edema            Signed Electronically by: Safia Morin, DO

## 2025-05-08 NOTE — TELEPHONE ENCOUNTER
RN called and spoke with [facility] pharmacy Pharmacist    RN advised patient is on both medications.      Domo Buchanan RN, BSN, PHN  [facility]

## 2025-05-08 NOTE — TELEPHONE ENCOUNTER
Routing to PCP    Patient had appt today for hospital follow up.    Pharmacy calling. They want to confirm that patient is on both clopidogrel and eliquis as they have active prescriptions for both. Please confirm.    NIC Ro Triage RN  [facility]

## 2025-05-12 ENCOUNTER — PATIENT OUTREACH (OUTPATIENT)
Dept: CARE COORDINATION | Facility: CLINIC | Age: 75
End: 2025-05-12
Payer: COMMERCIAL

## 2025-06-10 ENCOUNTER — TELEPHONE (OUTPATIENT)
Dept: FAMILY MEDICINE | Facility: CLINIC | Age: 75
End: 2025-06-10
Payer: COMMERCIAL

## 2025-06-10 NOTE — LETTER
Rylee 10, 2025    Andrew Torres  [address] [address]      Hello,     Your care team at [facility] values your health and well-being. After reviewing your chart, we have identified recommendation(s) to help you better manage your health.    It's time for your Medicare AWV. During your visit, we'll discuss your health, well-being, and any questions you may have related to preventive care. We'll also review any recommended tests, exams, or screenings you might need. To schedule please call your clinic at [phone] or use your [facility] account.     If you recently had or are having any of these services soon, please contact the clinic via phone or [facility] so that your care team can update your records.    We look forward to seeing you at your upcoming visit.    If you have any questions or concerns, please contact our clinic. Thank you for continuing to trust us with your care.    Sincerely,    Your [facility] Care Team

## 2025-06-10 NOTE — TELEPHONE ENCOUNTER
Patient Quality Outreach    Patient is due for the following:   Diabetes -  A1C, Eye Exam, Microalbumin, and Foot Exam  Physical Annual Wellness Visit      Topic Date Due    Zoster (Shingles) Vaccine (1 of 2) Never done    Pneumococcal Vaccine (2 of 2 - PCV) 05/06/2010    Diptheria Tetanus Pertussis (DTAP/TDAP/TD) Vaccine (6 - Td or Tdap) 05/06/2019    COVID-19 Vaccine (1 - 2024-25 season) Never done       Action(s) Taken:   Schedule a Annual Wellness Visit    Type of outreach:    Sent letter.    Questions for provider review:    None         Alaina Sampson MA  Chart routed to None.